# Patient Record
Sex: MALE | Race: BLACK OR AFRICAN AMERICAN | NOT HISPANIC OR LATINO | Employment: FULL TIME | URBAN - METROPOLITAN AREA
[De-identification: names, ages, dates, MRNs, and addresses within clinical notes are randomized per-mention and may not be internally consistent; named-entity substitution may affect disease eponyms.]

---

## 2018-04-12 ENCOUNTER — TELEPHONE (OUTPATIENT)
Dept: SPINE | Facility: CLINIC | Age: 58
End: 2018-04-12

## 2018-04-12 DIAGNOSIS — M54.50 LUMBAR PAIN: Primary | ICD-10-CM

## 2018-04-13 ENCOUNTER — OFFICE VISIT (OUTPATIENT)
Dept: SPINE | Facility: CLINIC | Age: 58
End: 2018-04-13
Payer: COMMERCIAL

## 2018-04-13 ENCOUNTER — HOSPITAL ENCOUNTER (OUTPATIENT)
Dept: RADIOLOGY | Facility: OTHER | Age: 58
Discharge: HOME | End: 2018-04-13
Attending: PHYSICIAN ASSISTANT
Payer: COMMERCIAL

## 2018-04-13 VITALS
HEART RATE: 100 BPM | SYSTOLIC BLOOD PRESSURE: 151 MMHG | HEIGHT: 69 IN | WEIGHT: 150 LBS | DIASTOLIC BLOOD PRESSURE: 98 MMHG | BODY MASS INDEX: 22.22 KG/M2

## 2018-04-13 DIAGNOSIS — M54.50 LUMBAR PAIN: ICD-10-CM

## 2018-04-13 DIAGNOSIS — M54.17 THORACIC AND LUMBOSACRAL NEURITIS: ICD-10-CM

## 2018-04-13 DIAGNOSIS — M47.26 OTHER SPONDYLOSIS WITH RADICULOPATHY, LUMBAR REGION: Primary | ICD-10-CM

## 2018-04-13 DIAGNOSIS — M54.14 THORACIC AND LUMBOSACRAL NEURITIS: ICD-10-CM

## 2018-04-13 DIAGNOSIS — M51.37 DDD (DEGENERATIVE DISC DISEASE), LUMBOSACRAL: ICD-10-CM

## 2018-04-13 PROCEDURE — 72100 X-RAY EXAM L-S SPINE 2/3 VWS: CPT | Mod: 26,,, | Performed by: RADIOLOGY

## 2018-04-13 PROCEDURE — 99999 PR PBB SHADOW E&M-EST. PATIENT-LVL III: CPT | Mod: PBBFAC,,, | Performed by: PHYSICIAN ASSISTANT

## 2018-04-13 PROCEDURE — 72100 X-RAY EXAM L-S SPINE 2/3 VWS: CPT | Mod: TC,FY

## 2018-04-13 PROCEDURE — 99204 OFFICE O/P NEW MOD 45 MIN: CPT | Mod: S$GLB,,, | Performed by: PHYSICIAN ASSISTANT

## 2018-04-13 PROCEDURE — 72120 X-RAY BEND ONLY L-S SPINE: CPT | Mod: 26,,, | Performed by: RADIOLOGY

## 2018-04-13 RX ORDER — GABAPENTIN 300 MG/1
CAPSULE ORAL
Qty: 90 CAPSULE | Refills: 2 | Status: SHIPPED | OUTPATIENT
Start: 2018-04-13 | End: 2018-10-05 | Stop reason: SDUPTHER

## 2018-04-13 RX ORDER — HYDROCHLOROTHIAZIDE 25 MG/1
TABLET ORAL
COMMUNITY
Start: 2018-04-02

## 2018-04-13 RX ORDER — GABAPENTIN 300 MG/1
CAPSULE ORAL
Qty: 90 CAPSULE | Refills: 2 | Status: SHIPPED | OUTPATIENT
Start: 2018-04-13 | End: 2018-04-13 | Stop reason: SDUPTHER

## 2018-04-13 NOTE — PROGRESS NOTES
"Subjective:      Patient ID: Nacho Liu is a 57 y.o. male.    Chief Complaint: Leg Pain      HPI     He is new to the [facility] system. History of heart murmur and HTN.     6-12 month history of intermittent right lateral leg pain to his foot. Minimal LBP and no left leg pain. 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 Pain worse with standing, bending, sitting. No known alleviating factors. No numbness, tingling, or weakness in his legs. Pain feels like "boiling water being poured down his leg." He rates his pain as a 2 on a scale of 1-10, when he has the pain it is a 10. No known injury. No previous back issues.     He had lumbar PAVITHRA x 2 by Dr. Peña at [facility] (was this year) with only 1-2 days of improvement. He went to PT for 4 weeks and it made him worse (was this year). No surgery on his back. He has not been on neurontin. No muscle relaxers. No relief with motrin or aleve.       Review of Systems   Constitution: Negative for fever, weakness, malaise/fatigue, night sweats, weight gain and weight loss.   HENT: Negative for hearing loss, nosebleeds and odynophagia.    Eyes: Negative for blurred vision and double vision.   Cardiovascular: Negative for chest pain, irregular heartbeat and palpitations.   Respiratory: Negative for cough, hemoptysis, shortness of breath and wheezing.    Endocrine: Negative for cold intolerance and polydipsia.   Hematologic/Lymphatic: Does not bruise/bleed easily.   Skin: Negative for dry skin, poor wound healing, rash and suspicious lesions.   Musculoskeletal:        See HPI for pertinent positives.   Gastrointestinal: Negative for bloating, abdominal pain, constipation, diarrhea, hematochezia, melena, nausea and vomiting.   Genitourinary: Negative for bladder incontinence, dysuria, hematuria, hesitancy and incomplete emptying.   Neurological: Negative for disturbances in coordination, dizziness, focal weakness, headaches, loss of balance, numbness, paresthesias and seizures.      "   Positive for abnormal arm/leg sensations.    Psychiatric/Behavioral: Negative for depression and hallucinations. The patient is not nervous/anxious.            Objective:        General: Nacho is well-developed, well-nourished, appears stated age, in no acute distress, alert and oriented to time, place and person.     General    Vitals reviewed.  Constitutional: He is oriented to person, place, and time. He appears well-developed and well-nourished.   Pulmonary/Chest: Effort normal.   Abdominal: He exhibits no distension.   Neurological: He is alert and oriented to person, place, and time.   Psychiatric: He has a normal mood and affect. His behavior is normal. Judgment and thought content normal.           Gait: normal, tandem walking is normal and he is able to heel/toe stand.     On exam of the lumbar spine, Inspection of back is normal, No tenderness noted    Skin in lumbar region is warm to the touch without visible rashes.     muscle tone normal without spasm, limited range of motion with pain  Pain in flexion.    Strength testing of the bilateral LEs shows  Right hip abduction:  +5/5  Left hip abduction:  +5/5  Right hip flexion:  +5/5  Left hip flexion:  +5/5  Right hip extensors:  +5/5  Left hip extensors:  +5/5  Right quadriceps:  +5/5  Left quadriceps:  +5/5  Right hamstring:  +5/5  Left hamstring:  +5/5  Right dorsiflexion:  +5/5  Left dorsiflexion:  +5/5  Right plantar flexion:  +5/5  Left plantar flexion:  +5/5   Right EHL:  +5/5   Left EHL:  +5/5    negative clonus of bilateral LEs.     negative straight leg raise on bilateral LEs.     Sensation is grossly intact in L2, L3, L4, L5, and S1 distribution.    Right hip has no pain with IR/ER. Left hip has no pain with IR/ER.      On exam of bilateral UEs, patient has full painfree ROM with no signs of clubbing, laxity, cyanosis, edema, instability, weakness, or tenderness.       XRAY INTERPRETATION:  X-rays of lumbar spine (AP/lat/flex/ext) dated  4/13/18 are personally reviewed and show mild lumbar spondylosis and DDD L4-L5 and L5-S1.        Assessment:       1. Other spondylosis with radiculopathy, lumbar region    2. DDD (degenerative disc disease), lumbosacral    3. Thoracic and lumbosacral neuritis           Plan:       Orders Placed This Encounter    gabapentin (NEURONTIN) 300 MG capsule       6-12 month history of intermittent right lateral leg pain to his foot. Minimal LBP and no left leg pain. He has daily pain that is worse at work (shucks oysters at [employer]). Known mild lumbar spondylosis and DDD L4-L5 and L5-S1. Had MRI and ESIs x 2 at [facility] with only 1-2 days of relief. No relief with PT. Treatment options reviewed with patient along with above lumbar XRs. Following plan made:     - Trial of neurontin 300mg to start q hs and increase to tid as tolerated. Reviewed dosing and side effects.   - Get injection records from Dr. Peña at [facility]. JAEL signed.   - Get MRI report and disc from [facility] for my review.   - Once I have injection records and MRI, I will review and call patient with further recommendations. May need to review with Varsha as he's failed conservative treatment (time, PT, ESIs).   - /98 with no CP or SOB. Did not take HCTZ today. Advised to take medications and call PCP if any issues.     Follow-up: Follow-up if symptoms worsen or fail to improve. If there are any questions prior to this, the patient was instructed to contact the office.

## 2018-05-21 ENCOUNTER — TELEPHONE (OUTPATIENT)
Dept: SPINE | Facility: CLINIC | Age: 58
End: 2018-05-21

## 2018-05-21 DIAGNOSIS — M47.26 OTHER SPONDYLOSIS WITH RADICULOPATHY, LUMBAR REGION: Primary | ICD-10-CM

## 2018-05-21 DIAGNOSIS — M54.17 THORACIC AND LUMBOSACRAL NEURITIS: ICD-10-CM

## 2018-05-21 DIAGNOSIS — M51.37 DDD (DEGENERATIVE DISC DISEASE), LUMBOSACRAL: ICD-10-CM

## 2018-05-21 DIAGNOSIS — M54.14 THORACIC AND LUMBOSACRAL NEURITIS: ICD-10-CM

## 2018-05-21 RX ORDER — METHYLPREDNISOLONE 4 MG/1
TABLET ORAL
Qty: 1 PACKAGE | Refills: 0 | Status: SHIPPED | OUTPATIENT
Start: 2018-05-21 | End: 2018-10-05

## 2018-05-21 NOTE — TELEPHONE ENCOUNTER
Will resend his JAEL for injection information. He dropped off MRI report from 11/9/17 showing DDD L4-S1 with right foraminal disc L5-S1 abutting bilateral SI nerve and right L5 nerve. No films available.     He either needs to get me these MRI films or we can update MRI since its been 6 months.     Can do dose pack in the interim.

## 2018-05-21 NOTE — TELEPHONE ENCOUNTER
Pt walked into clinic complaining of increased right leg pain. Per pts chart, pt was supposed to get MRI and records from [facility]. Pt stated he has not gone to  records yet. Nasrin recommended pt get updated MRI here at [facility] and send a dose pack to pts pharm. Pt verbalized understanding. Please order MRI and send rx to pharm in pts chart. Thanks

## 2018-05-23 ENCOUNTER — TELEPHONE (OUTPATIENT)
Dept: SPINE | Facility: CLINIC | Age: 58
End: 2018-05-23

## 2018-05-30 NOTE — PROGRESS NOTES
Subjective:      Patient ID: Nacho Liu is a 57 y.o. male.    Chief Complaint: Low-back Pain      HPI  ([place])    History of heart murmur and HTN.     Known mild lumbar spondylosis and DDD L4-L5 and L5-S1. Had MRI and ESIs x 2 at [facility] with only 1-2 days of relief. No relief with PT.     He dropped off MRI report from 11/9/17 showing DDD L4-S1 with right foraminal disc L5-S1 abutting bilateral SI nerve and right L5 nerve. No films available. I have not received his injection records from [facility].     Given a dose pack and here to review his updated lumbar MRI today.     He continues with more constant severe right lateral leg pain to his foot. Minimal LBP and no left leg pain. 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 Pain is also worse with standing, bending, sitting. No known alleviating factors. no improvement with neurontin, ultram, or medrol dose pack. No numbness, tingling, or weakness in his legs. Pain is sharp and stabbing. He rates his pain as a 10 on a scale of 1-10. As above, he had lumbar PAVITHRA x 2 by Dr. Peña at [facility] (was this year) with only 1-2 days of improvement. He went to PT for 4 weeks and it made him worse (was this year). No surgery on his back.       Review of Systems   Constitution: Negative for chills, fever, night sweats and weight gain.   Gastrointestinal: Negative for bowel incontinence, nausea and vomiting.   Genitourinary: Negative for bladder incontinence.   Neurological: Negative for disturbances in coordination and loss of balance.           Objective:        General: Nacho is well-developed, well-nourished, appears stated age, in no acute distress, alert and oriented to time, place and person.     Ortho/SPM Exam     Patient sits comfortably in the exam room and answers questions appropriately. Grossly patient is able to move bilateral LEs without difficulty. Ambulates with a limp favoring his right LE.     Strength testing of the bilateral LEs shows  Right hip abduction:   +5/5  Left hip abduction:  +5/5  Right hip flexion:  +5/5   Left hip flexion:  +5/5  Right hip extensors:  +5/5  Left hip extensors:  +5/5  Right quadriceps:  +5/5  Left quadriceps:  +5/5  Right hamstring:  +5/5  Left hamstring:  +5/5  Right dorsiflexion:   +5/5  Left dorsiflexion:  +5/5  Right plantar flexion:  +5/5  Left plantar flexion:  +5/5   Right EHL:  +5/5   Left EHL:  +5/5    Sensations is grossly intact to light touch.       XRAY INTERPRETATION:  MRI of lumbar spine dated 5/31/18 is personally reviewed and shows disc bulge/facet hypertrophy L4-L5 with annular tear. Disc bulge/facet hypertrophy L5-S1 with annular tear and mild right foraminal stenosis.     Above imaging reviewed with Dr. Maddox after patient was seen.         Assessment:       1. Thoracic and lumbosacral neuritis    2. DDD (degenerative disc disease), lumbosacral    3. Other spondylosis with radiculopathy, lumbar region    4. Foraminal stenosis of lumbar region    5. Annular tear of lumbar disc           Plan:            6-12 month history of intermittent right lateral leg pain to his foot that has become constant and severe. Minimal LBP and no left leg pain. Known mild lumbar spondylosis and DDD L4-L5 and L5-S1 with mild right foraminal stenosis L5-S1. Had ESIs x 2 at [facility] with only 1-2 days of relief. No relief with PT, neurontin, medrol dose pack, or ultram. Treatment options reviewed with patient along with above lumbar MRI. Following plan made:     - Recommend right L4-L5 and right L5-S1 TF PAVITHRA with pain management. He is not interested in any further injections.   - Continue neurontin.   - Have faxed JAEL for injection records to Dr. Peña at [facility]. Will try calling as well.   - Reviewed with Dr. Maddox after this visit. Not sure if he is a surgical candidate. Will try to get injection records and have him f/u with Dr. Maddox to discuss further. Patient advised.    - Of note, per radiology There is heterogeneous T1 bone marrow  signal throughout the lumbosacral spine most compatible with red marrow reconversion clinical correlation for possible underlying anemia. Patient advised to discuss further with PCP.     ADDENDUM 5/31/18:  Notes received from Dr. Luis Felipe Peña. He had right L4-L5 and L5-S1 TF PAVITHRA on 12/8/17 and 2/2/18. He also dropped of his previous MRI. This will be scanned into system.     Follow-up: Follow-up if symptoms worsen or fail to improve. If there are any questions prior to this, the patient was instructed to contact the office.

## 2018-05-31 ENCOUNTER — TELEPHONE (OUTPATIENT)
Dept: SPINE | Facility: CLINIC | Age: 58
End: 2018-05-31

## 2018-05-31 ENCOUNTER — OFFICE VISIT (OUTPATIENT)
Dept: SPINE | Facility: CLINIC | Age: 58
End: 2018-05-31
Payer: COMMERCIAL

## 2018-05-31 ENCOUNTER — HOSPITAL ENCOUNTER (OUTPATIENT)
Dept: RADIOLOGY | Facility: OTHER | Age: 58
Discharge: HOME | End: 2018-05-31
Attending: PHYSICIAN ASSISTANT
Payer: COMMERCIAL

## 2018-05-31 VITALS
HEART RATE: 63 BPM | DIASTOLIC BLOOD PRESSURE: 81 MMHG | SYSTOLIC BLOOD PRESSURE: 141 MMHG | WEIGHT: 150 LBS | HEIGHT: 69 IN | BODY MASS INDEX: 22.22 KG/M2

## 2018-05-31 DIAGNOSIS — M51.37 DDD (DEGENERATIVE DISC DISEASE), LUMBOSACRAL: ICD-10-CM

## 2018-05-31 DIAGNOSIS — M47.26 OTHER SPONDYLOSIS WITH RADICULOPATHY, LUMBAR REGION: ICD-10-CM

## 2018-05-31 DIAGNOSIS — M54.14 THORACIC AND LUMBOSACRAL NEURITIS: ICD-10-CM

## 2018-05-31 DIAGNOSIS — M54.17 THORACIC AND LUMBOSACRAL NEURITIS: Primary | ICD-10-CM

## 2018-05-31 DIAGNOSIS — M54.14 THORACIC AND LUMBOSACRAL NEURITIS: Primary | ICD-10-CM

## 2018-05-31 DIAGNOSIS — M48.061 FORAMINAL STENOSIS OF LUMBAR REGION: ICD-10-CM

## 2018-05-31 DIAGNOSIS — M54.17 THORACIC AND LUMBOSACRAL NEURITIS: ICD-10-CM

## 2018-05-31 DIAGNOSIS — M51.36 ANNULAR TEAR OF LUMBAR DISC: ICD-10-CM

## 2018-05-31 PROCEDURE — 3008F BODY MASS INDEX DOCD: CPT | Mod: CPTII,S$GLB,, | Performed by: PHYSICIAN ASSISTANT

## 2018-05-31 PROCEDURE — 72148 MRI LUMBAR SPINE W/O DYE: CPT | Mod: 26,,, | Performed by: RADIOLOGY

## 2018-05-31 PROCEDURE — 99213 OFFICE O/P EST LOW 20 MIN: CPT | Mod: S$GLB,,, | Performed by: PHYSICIAN ASSISTANT

## 2018-05-31 PROCEDURE — 72148 MRI LUMBAR SPINE W/O DYE: CPT | Mod: TC

## 2018-05-31 PROCEDURE — 99999 PR PBB SHADOW E&M-EST. PATIENT-LVL III: CPT | Mod: PBBFAC,,, | Performed by: PHYSICIAN ASSISTANT

## 2018-05-31 NOTE — TELEPHONE ENCOUNTER
Spoke to pt. Informed that Dr Maddox would like pt to f/u with him in clinic to discuss whether or not he is a surgical candidate. Informed pt that clinic has been unsuccessful in getting injection records from [facility]. Pt stated he would try to obtain them prior to appt with Dr. Maddox. Also informed pt that MRI showed findings of anemia and should be discussed with PCP> Pt  Verbalized understanding. Mailed MRI report to pt and appt with Varsha.

## 2018-05-31 NOTE — TELEPHONE ENCOUNTER
----- Message from Nasrin Benz PA-C sent at 5/31/2018 12:37 PM CDT -----  We have sent JAEL to Dr. Peña at [facility] multiple times. Please try to call his office to see if we can get injection records.     Call patient and let him know that Dr. Maddox is not sure if he is surgical candidate. He wants to see him in clinic to discuss. Please make this appointment. Ask if he can get injection records as well as we are having trouble getting them.     Finally, please let him know that MRI showed some findings that are concerning for anemia. He should discuss this with his primary care doctor. We can mail him copy of MRI.     Thanks.

## 2018-06-11 ENCOUNTER — OFFICE VISIT (OUTPATIENT)
Dept: ORTHOPEDICS | Facility: CLINIC | Age: 58
End: 2018-06-11
Payer: COMMERCIAL

## 2018-06-11 VITALS — BODY MASS INDEX: 21.25 KG/M2 | WEIGHT: 143.5 LBS | HEIGHT: 69 IN

## 2018-06-11 DIAGNOSIS — M54.16 LUMBAR RADICULOPATHY: Primary | ICD-10-CM

## 2018-06-11 PROCEDURE — 3008F BODY MASS INDEX DOCD: CPT | Mod: CPTII,S$GLB,, | Performed by: ORTHOPAEDIC SURGERY

## 2018-06-11 PROCEDURE — 99999 PR PBB SHADOW E&M-EST. PATIENT-LVL II: CPT | Mod: PBBFAC,,, | Performed by: ORTHOPAEDIC SURGERY

## 2018-06-11 PROCEDURE — 99214 OFFICE O/P EST MOD 30 MIN: CPT | Mod: S$GLB,,, | Performed by: ORTHOPAEDIC SURGERY

## 2018-06-11 NOTE — PROGRESS NOTES
DATE: 6/11/2018  PATIENT: Nacho Liu    Attending Physician: Crow Maddox M.D.    CHIEF COMPLAINT: right leg pain    HISTORY:  Nacho Liu is a 57 y.o. male  at [place] here for initial evaluation of right leg pain (Back - 0, Leg - 10). The pain has been present for 6 months to 1 year. The patient describes the pain as a burning pain shooting down his right lateral buttock and leg. He states this occurs every 2-3 minutes.  The pain is worse with prolonged standing/sitting/bending and improved by no. There is no associated numbness and tingling. There is no subjective weakness. Prior treatments have included PAVITHRA's, NSAID's, and PT, but no surgery. He states he is unable to sleep due to the pain.    The Patient denies myelopathic symptoms such as handwriting changes or difficulty with buttons/coins/keys. Denies perineal paresthesias, bowel/bladder dysfunction.    PAST MEDICAL/SURGICAL HISTORY:  Past Medical History:   Diagnosis Date    Heart murmur     Hypertension      Past Surgical History:   Procedure Laterality Date    KNEE SURGERY         Current Medications:   Current Outpatient Prescriptions:     gabapentin (NEURONTIN) 300 MG capsule, 1 po q hs x 3 days, then 1 po bid x 3 days, then 1 po tid., Disp: 90 capsule, Rfl: 2    hydroCHLOROthiazide (HYDRODIURIL) 25 MG tablet, , Disp: , Rfl:     methylPREDNISolone (MEDROL DOSEPACK) 4 mg tablet, use as directed x 6 days, Disp: 1 Package, Rfl: 0    Social History:   Social History     Social History    Marital status:      Spouse name: N/A    Number of children: N/A    Years of education: N/A     Occupational History    Not on file.     Social History Main Topics    Smoking status: Never Smoker    Smokeless tobacco: Never Used    Alcohol use Yes    Drug use: No    Sexual activity: Not on file     Other Topics Concern    Not on file     Social History Narrative    No narrative on file       REVIEW OF  "SYSTEMS:  Constitution: Negative. Negative for chills, fever and night sweats.   Cardiovascular: Negative for chest pain and syncope.   Respiratory: Negative for cough and shortness of breath.   Gastrointestinal: See HPI. Negative for nausea/vomiting. Negative for abdominal pain.  Genitourinary: See HPI. Negative for discoloration or dysuria.  Hematologic/Lymphatic: Negative for bleeding/clotting disorders.   Musculoskeletal: Negative for falls and muscle weakness.   Neurological: See HPI. No history of seizures. No history of cranial surgery or shunts.  Neurological: See HPI. No seizures.   Endocrine: Negative for polydipsia, polyphagia and polyuria.   Allergic/Immunologic: Negative for hives and persistent infections.     EXAM:  Ht 5' 9" (1.753 m)   Wt 65.1 kg (143 lb 8.3 oz)   BMI 21.19 kg/m²     PHYSICAL EXAMINATION:    General: The patient is a pleasant 57 y.o. male in no apparent distress, the patient is orientatied to person, place and time.  Psych: Normal mood and affect  HEENT: Vision grossly intact, hearing intact to the spoken word.  Lungs: Respirations unlabored.  Gait: Normal station and gait, no difficulty with toe or heel walk.   Skin: Dorsal lumbar skin negative for rashes, lesions, hairy patches and surgical scars. There is no lumbar tenderness to palpation.  Range of motion: Lumbar range of motion is acceptable.  Spinal Balance: Global saggital and coronal spinal balance acceptable, no significant for scoliosis and kyphosis.  Musculoskeletal: No pain with the range of motion of the bilateral hips. No trochanteric tenderness to palpation.  Vascular: Bilateral lower extremities warm and well perfused, Dorsalis pedis pulses 2+ bilaterally.  Neurological: Normal strength and tone in all major motor groups in the bilateral lower extremities. Normal sensation to light touch in the L2-S1 dermatomes bilaterally.  Deep tendon reflexes symmetric in the bilateral lower extremities.  Negative Babinski " bilaterally. Straight leg raise negative bilaterally.    IMAGING:      Today I personally reviewed AP, Lat and Flex/Ex upright L-spine that demonstrate normal alignment. There is disc degeneration worst at L4-5.    Body mass index is 21.19 kg/m².  No results found for: HGBA1C    ASSESSMENT/PLAN:    Diagnoses and all orders for this visit:    Lumbar radiculopathy      The patient's symptoms of right leg radiculopathy do not correlate with his MRI. Additionally, the fact that he got no relief from an PAVITHRA points against a lumbar radiculopathy as the etiology of his symptoms.  Will obtain MRI of the pelvis and an EMG of the RLE.  F/U after.

## 2018-07-06 ENCOUNTER — PROCEDURE VISIT (OUTPATIENT)
Dept: NEUROLOGY | Facility: CLINIC | Age: 58
End: 2018-07-06
Payer: COMMERCIAL

## 2018-07-06 VITALS — BODY MASS INDEX: 21.18 KG/M2 | WEIGHT: 143 LBS | HEIGHT: 69 IN

## 2018-07-06 DIAGNOSIS — M54.16 LUMBAR RADICULOPATHY: Primary | ICD-10-CM

## 2018-07-06 PROCEDURE — 95909 NRV CNDJ TST 5-6 STUDIES: CPT | Mod: S$GLB,,, | Performed by: NEUROLOGICAL SURGERY

## 2018-07-06 PROCEDURE — 95886 MUSC TEST DONE W/N TEST COMP: CPT | Mod: S$GLB,,, | Performed by: NEUROLOGICAL SURGERY

## 2018-07-06 RX ORDER — PREGABALIN 75 MG/1
75 CAPSULE ORAL 2 TIMES DAILY
Qty: 60 CAPSULE | Refills: 6 | Status: CANCELLED | OUTPATIENT
Start: 2018-07-06 | End: 2019-01-04

## 2018-07-10 ENCOUNTER — TELEPHONE (OUTPATIENT)
Dept: NEUROLOGY | Facility: CLINIC | Age: 58
End: 2018-07-10

## 2018-07-10 RX ORDER — PREGABALIN 75 MG/1
75 CAPSULE ORAL 2 TIMES DAILY
COMMUNITY
End: 2018-10-05

## 2018-07-10 NOTE — TELEPHONE ENCOUNTER
----- Message from Steven Ortiz sent at 7/10/2018  9:35 AM CDT -----  Contact: Self  Pt states Dr Alejandra was suppose to send his medication to the pharmacy on last Friday, but it's not there    Mr [id number]-[phone]        Sent to Dr. Alejandra.

## 2018-07-11 ENCOUNTER — HOSPITAL ENCOUNTER (OUTPATIENT)
Dept: RADIOLOGY | Facility: HOSPITAL | Age: 58
Discharge: HOME | End: 2018-07-11
Attending: ORTHOPAEDIC SURGERY
Payer: COMMERCIAL

## 2018-07-11 DIAGNOSIS — M54.16 LUMBAR RADICULOPATHY: ICD-10-CM

## 2018-07-11 PROCEDURE — 72195 MRI PELVIS W/O DYE: CPT | Mod: TC

## 2018-07-11 PROCEDURE — 72195 MRI PELVIS W/O DYE: CPT | Mod: 26,,, | Performed by: RADIOLOGY

## 2018-07-19 ENCOUNTER — TELEPHONE (OUTPATIENT)
Dept: SPINE | Facility: CLINIC | Age: 58
End: 2018-07-19

## 2018-07-19 NOTE — TELEPHONE ENCOUNTER
----- Message from Sofia Vega sent at 7/19/2018  2:26 PM CDT -----  Contact: Pt    Name of Who is Calling:WILIAN RODRÍGUEZ [[id number]]    What is the request in detail: Patient states he would like to know his test results  Please contact to further discuss and advise    Can the clinic reply by [facility]: No    What Number to Call Back if not in [facility]: [phone]

## 2018-07-23 ENCOUNTER — TELEPHONE (OUTPATIENT)
Dept: SPINE | Facility: CLINIC | Age: 58
End: 2018-07-23

## 2018-07-23 NOTE — TELEPHONE ENCOUNTER
----- Message from Hunter Marks sent at 7/23/2018  9:41 AM CDT -----  Contact: Nacho Liu, patient            Name of Who is Calling: Nacho Liu, patient      What is the request in detail: Patient called for his test results and states pain medication is not working      Can the clinic reply by [facility]: No      What Number to Call Back if not in [facility]: [phone]

## 2018-07-23 NOTE — TELEPHONE ENCOUNTER
----- Message from Hunter Marks sent at 7/23/2018  9:41 AM CDT -----  Contact: Nacho Liu, patient            Name of Who is Calling: Nacho Liu, patient      What is the request in detail: Patient called for his test results and states pain medication is not working      Can the clinic reply by [facility]: No      What Number to Call Back if not in [place]: [phone]

## 2018-07-25 ENCOUNTER — DOCUMENTATION ONLY (OUTPATIENT)
Dept: SPINE | Facility: CLINIC | Age: 58
End: 2018-07-25

## 2018-07-25 ENCOUNTER — TELEPHONE (OUTPATIENT)
Dept: SPINE | Facility: CLINIC | Age: 58
End: 2018-07-25

## 2018-07-25 DIAGNOSIS — M51.36 ANNULAR TEAR OF LUMBAR DISC: ICD-10-CM

## 2018-07-25 DIAGNOSIS — M54.17 THORACIC AND LUMBOSACRAL NEURITIS: Primary | ICD-10-CM

## 2018-07-25 DIAGNOSIS — M51.37 DDD (DEGENERATIVE DISC DISEASE), LUMBOSACRAL: ICD-10-CM

## 2018-07-25 DIAGNOSIS — M48.061 FORAMINAL STENOSIS OF LUMBAR REGION: ICD-10-CM

## 2018-07-25 DIAGNOSIS — M47.26 OTHER SPONDYLOSIS WITH RADICULOPATHY, LUMBAR REGION: ICD-10-CM

## 2018-07-25 DIAGNOSIS — M54.14 THORACIC AND LUMBOSACRAL NEURITIS: Primary | ICD-10-CM

## 2018-07-25 NOTE — TELEPHONE ENCOUNTER
----- Message from Nasrin Benz PA-C sent at 7/25/2018  7:58 AM CDT -----  He has appt with me this afternoon. Please let him know that the MRI of his pelvis and his nerve test were normal. I reviewed everything with Dr. Maddox and he wants patient to see pain management for further evaluation. I am happy to see him this afternoon or we can just set him up to see pain.     If he asks about medications, please remind him that I do not prescribe narcotics.     Thanks!

## 2018-07-25 NOTE — TELEPHONE ENCOUNTER
Patient was contacted and informed that his Mri and nerve test were normal and that he can follow up with Pain Mgmt. Patient was offered an appt in Pain Mgmt and he accepted.

## 2018-07-25 NOTE — PROGRESS NOTES
MRI of pelvis and EMG of LEs were normal. Patient reviewed with Dr. Maddox. He recommends referral to pain management. Unsure of etiology of pain.     Patient advised and wanted to cancel appointment with me today.

## 2018-08-22 ENCOUNTER — OFFICE VISIT (OUTPATIENT)
Dept: PAIN MEDICINE | Facility: CLINIC | Age: 58
End: 2018-08-22
Attending: ANESTHESIOLOGY
Payer: COMMERCIAL

## 2018-08-22 VITALS
WEIGHT: 144.81 LBS | DIASTOLIC BLOOD PRESSURE: 82 MMHG | HEIGHT: 69 IN | HEART RATE: 68 BPM | TEMPERATURE: 99 F | BODY MASS INDEX: 21.45 KG/M2 | SYSTOLIC BLOOD PRESSURE: 156 MMHG

## 2018-08-22 DIAGNOSIS — M51.36 DDD (DEGENERATIVE DISC DISEASE), LUMBAR: Primary | ICD-10-CM

## 2018-08-22 DIAGNOSIS — M54.16 LUMBAR RADICULOPATHY: Primary | ICD-10-CM

## 2018-08-22 DIAGNOSIS — M47.26 OSTEOARTHRITIS OF SPINE WITH RADICULOPATHY, LUMBAR REGION: ICD-10-CM

## 2018-08-22 PROCEDURE — 99999 PR PBB SHADOW E&M-EST. PATIENT-LVL III: CPT | Mod: PBBFAC,,, | Performed by: ANESTHESIOLOGY

## 2018-08-22 PROCEDURE — 99245 OFF/OP CONSLTJ NEW/EST HI 55: CPT | Mod: S$GLB,,, | Performed by: ANESTHESIOLOGY

## 2018-08-22 NOTE — LETTER
August 22, 2018      Crow Maddox MD  [address]  [facility] [address]           [facility]  [address]  [facility] [address]  Phone: [phone]  Fax: [phone]          Patient: Nacho Liu   MR Number: [id number]   YOB: 1960   Date of Visit: 8/22/2018       Dear Dr. Crow Maddox:    Thank you for referring Nacho Liu to me for evaluation. Attached you will find relevant portions of my assessment and plan of care.    If you have questions, please do not hesitate to call me. I look forward to following Nacho Liu along with you.    Sincerely,    Pascual Yadav MD    Enclosure  CC:  No Recipients    If you would like to receive this communication electronically, please contact [email or web address] or [phone] to request more information on [facility] Link access.    For providers and/or their staff who would like to refer a patient to [facility], please contact us through our one-stop-shop provider referral line, [facility], at [phone].    If you feel you have received this communication in error or would no longer like to receive these types of communications, please e-mail [email or web address]

## 2018-08-22 NOTE — PROGRESS NOTES
"Subjective:      Patient ID: Nacho Liu is a 57 y.o. male.    Chief Complaint: Leg Pain (right side)    Referred by: Crow Maddox MD     Pain Scales  Best: 10/10  Worst: 10/10  Usually: 10/10  Today: 10/10    Leg Pain    Associated symptoms include stiffness. Pertinent negatives include no fever or itching.     Patient with left leg pain for past 1 year. Patient states that he "just woke up" with it one morning. No trauma or other inciting event. Pain is located from the right buttock, radiating down the lateral right LE and into the bottom of the right foot. The pain is constant throughout the day and night. It is described as burning, "like pouring boiling water down my leg". It is worse early in the morning, lying in bed (especially on stomach) and with sitting upright. It is somewhat relieved with stretching his leg out straight and walking. He takes gabapentin 300 mg TID, but states that it is not helping. He has tried ES Tylenol and ibuprofen, neither of which helped. Had PAVITHRA at [facility] about 6 months ago, states that it did not help. Ice, heat, and physical therapy have not helped. Denies B/B incontinence or saddle anesthesia.     Interventional Pain History  Epidural in past at [facility].    Past Medical History:   Diagnosis Date    Heart murmur     Hypertension        Past Surgical History:   Procedure Laterality Date    KNEE SURGERY         Review of patient's allergies indicates:   Allergen Reactions    Lipitor [atorvastatin] Swelling       Current Outpatient Medications   Medication Sig Dispense Refill    gabapentin (NEURONTIN) 300 MG capsule 1 po q hs x 3 days, then 1 po bid x 3 days, then 1 po tid. 90 capsule 2    hydroCHLOROthiazide (HYDRODIURIL) 25 MG tablet       methylPREDNISolone (MEDROL DOSEPACK) 4 mg tablet use as directed x 6 days 1 Package 0    pregabalin (LYRICA) 75 MG capsule Take 75 mg by mouth 2 (two) times daily.       No current facility-administered medications for this " visit.        Family History   Problem Relation Age of Onset    Arthritis Mother     Cirrhosis Father        Social History     Socioeconomic History    Marital status:      Spouse name: Not on file    Number of children: Not on file    Years of education: Not on file    Highest education level: Not on file   Social Needs    Financial resource strain: Not on file    Food insecurity - worry: Not on file    Food insecurity - inability: Not on file    Transportation needs - medical: Not on file    Transportation needs - non-medical: Not on file   Occupational History    Not on file   Tobacco Use    Smoking status: Never Smoker    Smokeless tobacco: Never Used   Substance and Sexual Activity    Alcohol use: Yes    Drug use: No    Sexual activity: Not on file   Other Topics Concern    Not on file   Social History Narrative    Not on file           Review of Systems   Constitution: Negative for chills, fever, malaise/fatigue, weight gain and weight loss.   HENT: Negative for ear pain and hoarse voice.    Eyes: Negative for blurred vision, pain and visual disturbance.   Cardiovascular: Negative for chest pain, dyspnea on exertion and irregular heartbeat.   Respiratory: Negative for cough, shortness of breath and wheezing.    Endocrine: Negative for cold intolerance and heat intolerance.   Hematologic/Lymphatic: Negative for adenopathy and bleeding problem. Does not bruise/bleed easily.   Skin: Negative for color change, itching and rash.   Musculoskeletal: Positive for stiffness. Negative for back pain.   Gastrointestinal: Negative for change in bowel habit, diarrhea, hematemesis, hematochezia, melena and vomiting.   Genitourinary: Negative for flank pain, frequency, hematuria and urgency.   Neurological: Positive for loss of balance. Negative for difficulty with concentration, dizziness, headaches and seizures.   Psychiatric/Behavioral: Negative for altered mental status, depression and suicidal  ideas. The patient is not nervous/anxious.    Allergic/Immunologic: Negative for HIV exposure.     IMAGING:  Contains abnormal data MRI Lumbar Spine Without Contrast   Order: [id number]   Status:  Final result   Visible to patient:  No (Not Released)   Next appt:  None   Dx:  DDD (degenerative disc disease), lumb...   Details     Reading Physician Reading Date Result Priority   Renny No, DO 5/31/2018       Narrative     EXAMINATION:  MRI LUMBAR SPINE WITHOUT CONTRAST    CLINICAL HISTORY:  evaluate right LE radiculitis; Other spondylosis with radiculopathy, lumbar region    TECHNIQUE:  Sagittal T1, T2, stir and axial T1-T2 imaging of the lumbar spine without contrast.    COMPARISON:  Radiograph 04/13/2018    FINDINGS:  There is straightening of the normal lumbar lordosis.  Heterogeneous T1 bone marrow signal throughout the lumbar spine without focal bone marrow edema.  Overall concerning for  red marrow reconversion clinical correlation for underlying chronic anemia, no evidence for focal infiltrative process.    There is scattered endplate degeneration most prominent at L4/L5 level with superimposed disc desiccation and height loss.    The distal spinal cord and conus is normal in signal and contour tip of the conus approximates the inferior L1 level.    No aneurysmal dilatation of the visualized abdominal aorta.    T12/L1 through L1/L2: No significant disc bulge, central canal or neural foraminal stenosis.    L2/L3: No significant disc bulge central canal or neural foraminal stenosis.    L3/L4:Small posterior disc osteophyte without significant central canal or neural foraminal stenosis    L4/L5:Posterior disc osteophyte with ligamentum flavum hypertrophy and small annular fissure with mild central canal stenosis and mild neural foraminal narrowing.    L5/S1: Small posterior disc osteophyte with ligamentum flavum hypertrophy without significant central canal stenosis with attenuation of the thecal sac related  to epidural lipomatosis.  There is facet joint arthropathy and mild moderate neural foraminal stenosis bilaterally.    This report was flagged in Epic as abnormal.      Impression       Multilevel degenerative change lumbar spine most pronounced at L4/L5 with posterior disc osteophyte with ligamentum flavum hypertrophy and small annular fissure there is mild central canal and neural foraminal stenosis.    There is heterogeneous T1 bone marrow signal throughout the lumbosacral spine most compatible with red marrow reconversion clinical correlation for possible underlying anemia.      Electronically signed by: Renny No DO  Date: 05/31/2018  Time: 11:50           MRI Pelvis Without Contrast   Order: [id number]   Status:  Final result   Visible to patient:  No (Not Released)   Next appt:  None   Dx:  Lumbar radiculopathy   Details     Reading Physician Reading Date Result Priority   Kiko Dumont MD 7/11/2018    SONIDO Benson 7/11/2018       Narrative     EXAMINATION:  MRI PELVIS WITHOUT CONTRAST    CLINICAL HISTORY:  RLE radiculopathy, MRI lumbar spine normal, eval for sciatic nerve compression;  Radiculopathy, lumbar region    TECHNIQUE:  Multisequence, multiplanar MRI of bilateral hips performed without contrast.    COMPARISON:  MRI lumbar spine 05/31/2018.    FINDINGS:  Lumbosacral plexus: Unremarkable.  Specifically, sciatic nerves demonstrate normal course and caliber without evidence for compression.  Piriformis muscles are symmetric.    Bones: No fractures.  Heterogeneous T1 signal of the visualized bone marrow compatible with red marrow reconversion.    Miscellaneous: Limited evaluation of pelvic soft tissues is unremarkable.      Impression       1. Evaluation of the sciatic nerves is unremarkable.  2. Heterogeneous T1 signal of the visualized bone marrow most compatible with red marrow reconversion.    Electronically signed by resident: Nikolai Montes  Date: 07/11/2018  Time:  "15:28    Electronically signed by: Kiko Dumont MD  Date: 07/11/2018  Time: 15:41                    Objective:   BP (!) 156/82   Pulse 68   Temp 98.6 °F (37 °C)   Ht 5' 9" (1.753 m)   Wt 65.7 kg (144 lb 13.5 oz)   BMI 21.39 kg/m²   Pain Disability Index Review:  Last 3 PDI Scores 8/22/2018   Pain Disability Index (PDI) 50     Normocephalic.  Atraumatic.  Affect appropriate.  Breathing unlabored.  Extra ocular muscles intact.       BP (!) 156/82   Pulse 68   Temp 98.6 °F (37 °C)   Ht 5' 9" (1.753 m)   Wt 65.7 kg (144 lb 13.5 oz)   BMI 21.39 kg/m²     PHYSICAL EXAMINATION:  GEN:  Well developed, well nourished.  No acute distress. Normal pain behavior.  HEENT:  No trauma.  Mucous membranes moist.  Nares patent bilaterally.  PSYCH: Normal affect. Thought content appropriate.  CHEST:  Breathing symmetric.  No audible wheezing.  ABD: Soft, non-tender, non-distended.  SKIN:  Warm, pink, dry.  No rash on exposed areas.    EXT:  No cyanosis, clubbing, or edema.  No color change or changes in nail or hair growth.    NEURO/MUSCULOSKELETAL:  Fully alert, oriented, and appropriate. Speech normal mireya. No cranial nerve deficits.   Gait: Normal.   Strength: 5/5 motor strength throughout bilateral upper and lower extremities myotomes.   Sensory: No sensory deficit in the lower extremities dermatomes.   Reflexes:  2+ and symmetric throughout.  Downgoing Babinski's bilaterally, negative wni's.  No clonus or spasticity.    L-Spine:  Normal ROM without pain. - facet loading bilaterally.  - SLR bilaterally.    No TTP over lumbar paraspinals    SI Joint/Hip: + LOLI on right. + FADIR on right. Limited internal and external rotation of right hip.  No TTP over bilateral SI joints, hips, piriformis muscles, or GTB.        Ortho/SPM Exam      Assessment:       Encounter Diagnoses   Name Primary?    DDD (degenerative disc disease), lumbar Yes    Osteoarthritis of spine with radiculopathy, lumbar region          Plan: "   We discussed with the patient the assessment and recommendations. The following is the plan we agreed on:        Nacho was seen today for leg pain.    Diagnoses and all orders for this visit:    DDD (degenerative disc disease), lumbar    Osteoarthritis of spine with radiculopathy, lumbar region    1. Will Schedule for right L4 and S1 TF PAVITHRA.  2. RTC 1 month following procedure.    Hugo Neal   [facility]   On review of the MRI I think the L5-S1 disc is in contact with the S1 root.  We will see of this injection and S1 as well as the L4 help his symptomatology.  I have personally taken the history and examined this patient and agree with the resident's note as stated above.

## 2018-08-30 ENCOUNTER — HOSPITAL ENCOUNTER (OUTPATIENT)
Facility: OTHER | Age: 58
Discharge: HOME | End: 2018-08-30
Attending: ANESTHESIOLOGY | Admitting: ANESTHESIOLOGY
Payer: COMMERCIAL

## 2018-08-30 VITALS
SYSTOLIC BLOOD PRESSURE: 150 MMHG | HEART RATE: 79 BPM | OXYGEN SATURATION: 100 % | BODY MASS INDEX: 21.48 KG/M2 | TEMPERATURE: 99 F | WEIGHT: 145 LBS | DIASTOLIC BLOOD PRESSURE: 88 MMHG | RESPIRATION RATE: 18 BRPM | HEIGHT: 69 IN

## 2018-08-30 DIAGNOSIS — M51.37 DDD (DEGENERATIVE DISC DISEASE), LUMBOSACRAL: ICD-10-CM

## 2018-08-30 DIAGNOSIS — M54.16 LUMBAR RADICULOPATHY: Primary | ICD-10-CM

## 2018-08-30 DIAGNOSIS — M47.819 SPONDYLOSIS WITHOUT MYELOPATHY: ICD-10-CM

## 2018-08-30 PROCEDURE — 64484 NJX AA&/STRD TFRM EPI L/S EA: CPT | Performed by: ANESTHESIOLOGY

## 2018-08-30 PROCEDURE — 63600175 PHARM REV CODE 636 W HCPCS: Performed by: ANESTHESIOLOGY

## 2018-08-30 PROCEDURE — 25000003 PHARM REV CODE 250: Performed by: ANESTHESIOLOGY

## 2018-08-30 PROCEDURE — 25500020 PHARM REV CODE 255: Performed by: ANESTHESIOLOGY

## 2018-08-30 PROCEDURE — 64483 NJX AA&/STRD TFRM EPI L/S 1: CPT | Performed by: ANESTHESIOLOGY

## 2018-08-30 PROCEDURE — 62323 NJX INTERLAMINAR LMBR/SAC: CPT | Mod: ,,, | Performed by: ANESTHESIOLOGY

## 2018-08-30 RX ORDER — SODIUM CHLORIDE 9 MG/ML
500 INJECTION, SOLUTION INTRAVENOUS CONTINUOUS
Status: DISCONTINUED | OUTPATIENT
Start: 2018-08-30 | End: 2018-08-30 | Stop reason: HOSPADM

## 2018-08-30 RX ORDER — LIDOCAINE HYDROCHLORIDE 10 MG/ML
INJECTION INFILTRATION; PERINEURAL
Status: DISCONTINUED | OUTPATIENT
Start: 2018-08-30 | End: 2018-08-30 | Stop reason: HOSPADM

## 2018-08-30 RX ORDER — BUPIVACAINE HYDROCHLORIDE 2.5 MG/ML
INJECTION, SOLUTION EPIDURAL; INFILTRATION; INTRACAUDAL
Status: DISCONTINUED | OUTPATIENT
Start: 2018-08-30 | End: 2018-08-30 | Stop reason: HOSPADM

## 2018-08-30 RX ORDER — DEXAMETHASONE SODIUM PHOSPHATE 100 MG/10ML
INJECTION INTRAMUSCULAR; INTRAVENOUS
Status: DISCONTINUED | OUTPATIENT
Start: 2018-08-30 | End: 2018-08-30 | Stop reason: HOSPADM

## 2018-08-30 RX ORDER — ALPRAZOLAM 0.5 MG/1
1 TABLET, ORALLY DISINTEGRATING ORAL
Status: DISCONTINUED | OUTPATIENT
Start: 2018-08-30 | End: 2018-08-30 | Stop reason: HOSPADM

## 2018-08-30 RX ADMIN — ALPRAZOLAM 1 MG: 0.5 TABLET, ORALLY DISINTEGRATING ORAL at 08:08

## 2018-08-30 NOTE — OP NOTE
Lumbar Interlaminar Epidural Steroid Injection under Fluoroscopic Guidance.  Time-out taken to identify patient and procedure side prior to starting the procedure.   I attest that I have reviewed the patient's home medications prior to the procedure and no contraindication have been identified. I  re-evaluated the patient after the patient was positioned for the procedure in the procedure room immediately before the procedural time-out. The vital signs are current and represent the current state of the patient which has not significantly changed since the preprocedure assessment.                                                               Date of Service: 08/30/2018    PCP: Nasrin Benz PA-C    Referring Physician:    Procedure:  L 4-5 Interlaminar epidural steroid injection under fluoroscopic guidance.    Reasons for procedure: Lumbar radiculopathy [M54.16]   1. Lumbar radiculopathy    2. DDD (degenerative disc disease), lumbosacral    3. Spondylosis without myelopathy      POSTOP DIAGNOSIS:  Lumbar radiculopathy [M54.16]     1. Lumbar radiculopathy    2. DDD (degenerative disc disease), lumbosacral    3. Spondylosis without myelopathy      Physician: Pascual Yadav MD  ASSISTANTS: none    Medications injected: Preservative-free dexamethasone 10mg with 4mL of sterile Xylocaine-MPF 1% and 1ml of sterile preservative-free normal saline.    Local anesthetic injected:    Xylocaine 1% 9ml with Sodium Bicarbonate 1ml.   Sedation Medications: None    Estimated blood loss:  none.    Complications:  none.    Technique:  With the patient laying in a prone position, the area was prepped and draped in the usual sterile fashion using ChloraPrep and a fenestrated drape.  Local anesthetic was given using a 27-gauge needle by raising a wheal and going down to the hub of the needle.  A 3.5 inch 20-gauge Touhy needle was introduced under fluoroscopic guidance.  It met the lamina of the posterior element. The needle was then hinged  above the lamina.  Loss of resistance technique was employed while advancing the needle.  Once in the desired position, contrast dye Omnipaque was injected to confirm placement and there was no vascular runoff.  Digital subtraction was employed to confirm that there was no vascular runoff.  The medication was then injected slowly.  The patient tolerated the procedure well.      Pain before the procedure: 10/10    Pain after the procedure: 0/10    The patient was monitored after the procedure.   They were given post-procedure and discharge instructions to follow at home.  The patient was discharged in a stable condition.

## 2018-08-30 NOTE — PLAN OF CARE
Pt AAOx3, pt able to tolerate PO intake. Pt able to stand and ambulate independently. Pt denies pain at this time, bandaids CDI.

## 2018-08-30 NOTE — DISCHARGE INSTRUCTIONS
Thank you for allowing us to care for you today. You may receive a survey about the care we provided. Your feedback is valuable and helps us provide excellent care throughout the community.     Home Care Instructions for Pain Management:    1. DIET:   You may resume your normal diet today.   2. BATHING:   You may shower with luke warm water. No tub baths or anything that will soak injection sites under water for the next 24 hours.  3. DRESSING:   You may remove your bandage today.   4. ACTIVITY LEVEL:   You may resume your normal activities 24 hrs after your procedure. Nothing strenuous today.  5. MEDICATIONS:   You may resume your normal medications today. To restart blood thinners, ask your doctor.  6. DRIVING    If you have received any sedatives by mouth today, you may not drive for 12 hours.    If you have received any sedation through your IV, you may not drive for 24 hrs.   7. SPECIAL INSTRUCTIONS:   No heat to the injection site for 24 hrs including, hot bath or shower, heating pad, moist heat, or hot tubs.    Use ice pack to injection site for any pain or discomfort.  Apply ice packs for 20 minute intervals as needed.    IF you have diabetes, be sure to monitor your blood sugar more closely. IF your injection contained steroids your blood sugar levels may become higher than normal.    If you are still having pain upon discharge:  Your pain may improve over the next 48 hours. The anesthetic (numbing medication) works immediately to 48 hours. IF your injection contained a steroid (anti-inflammatory medication), it takes approximately 3 days to start feeling relief and 7-10 days to see your greatest results from the medication. It is possible you may need subsequent injections. This would be discussed at your follow up appointment with pain management or your referring doctor.      PLEASE CALL YOUR DOCTOR IF:  1. Redness or swelling around the injection site.  2. Fever of 101 degrees or more  3. Drainage  (pus) from the injection site.  4. For any continuous bleeding (some dried blood over the incision is normal.)    FOR EMERGENCIES:   If any unusual problems or difficulties occur during clinic hours, call [phone] or [phone].

## 2018-10-05 ENCOUNTER — OFFICE VISIT (OUTPATIENT)
Dept: PAIN MEDICINE | Facility: CLINIC | Age: 58
End: 2018-10-05
Payer: COMMERCIAL

## 2018-10-05 VITALS
WEIGHT: 147 LBS | RESPIRATION RATE: 18 BRPM | HEIGHT: 69 IN | BODY MASS INDEX: 21.77 KG/M2 | SYSTOLIC BLOOD PRESSURE: 150 MMHG | TEMPERATURE: 99 F | DIASTOLIC BLOOD PRESSURE: 90 MMHG | HEART RATE: 65 BPM

## 2018-10-05 DIAGNOSIS — M47.26 OSTEOARTHRITIS OF SPINE WITH RADICULOPATHY, LUMBAR REGION: ICD-10-CM

## 2018-10-05 DIAGNOSIS — M51.36 DDD (DEGENERATIVE DISC DISEASE), LUMBAR: ICD-10-CM

## 2018-10-05 DIAGNOSIS — M54.16 LUMBAR RADICULOPATHY: Primary | ICD-10-CM

## 2018-10-05 DIAGNOSIS — M51.37 DDD (DEGENERATIVE DISC DISEASE), LUMBOSACRAL: ICD-10-CM

## 2018-10-05 PROCEDURE — 3008F BODY MASS INDEX DOCD: CPT | Mod: CPTII,S$GLB,, | Performed by: NURSE PRACTITIONER

## 2018-10-05 PROCEDURE — 99999 PR PBB SHADOW E&M-EST. PATIENT-LVL III: CPT | Mod: PBBFAC,,, | Performed by: NURSE PRACTITIONER

## 2018-10-05 PROCEDURE — 99213 OFFICE O/P EST LOW 20 MIN: CPT | Mod: S$GLB,,, | Performed by: NURSE PRACTITIONER

## 2018-10-05 RX ORDER — GABAPENTIN 300 MG/1
CAPSULE ORAL
Qty: 120 CAPSULE | Refills: 2 | Status: SHIPPED | OUTPATIENT
Start: 2018-10-05 | End: 2019-01-21 | Stop reason: DRUGHIGH

## 2018-10-05 NOTE — PROGRESS NOTES
Chronic patient Established Note (Follow up visit)      SUBJECTIVE:    Nacho Liu presents to the clinic for a follow-up appointment for low back and leg pain. He is s/p right L4 and S1 TF PAVITHRA on 8/30/2018. He reports 80% relief of his pain for 3 days. Then his pain has gradually returned. He continues to report low back pain that radiates down side and back of his right leg to under his foot. He denies any right leg pain. He describes this pain as burning in nature. His pain is worse with prolonged standing. He continues to take Gabapentin 300 mg TID with some benefit. He denies any other health changes. Since the last visit, Nacho Liu states the pain has been persistant. Current pain intensity is 8/10.    Pain Disability Index Review:  Last 3 PDI Scores 10/5/2018 8/22/2018   Pain Disability Index (PDI) 62 50       Pain Medications:  Gabapentin 300 mg TID    Opioid Contract: no     report:  Reviewed and consistent with medication use as prescribed.    Pain Procedures:   2 previous ESIs at [facility] in 2018  8/30/2018- Right L4 and S1 TF PAVITHRA     Physical Therapy/Home Exercise: no    Imaging:   MRI Lumbar Spine 5/31/2018:  FINDINGS:  There is straightening of the normal lumbar lordosis.  Heterogeneous T1 bone marrow signal throughout the lumbar spine without focal bone marrow edema.  Overall concerning for  red marrow reconversion clinical correlation for underlying chronic anemia, no evidence for focal infiltrative process.    There is scattered endplate degeneration most prominent at L4/L5 level with superimposed disc desiccation and height loss.    The distal spinal cord and conus is normal in signal and contour tip of the conus approximates the inferior L1 level.    No aneurysmal dilatation of the visualized abdominal aorta.    T12/L1 through L1/L2: No significant disc bulge, central canal or neural foraminal stenosis.    L2/L3: No significant disc bulge central canal or neural foraminal  stenosis.    L3/L4:Small posterior disc osteophyte without significant central canal or neural foraminal stenosis    L4/L5:Posterior disc osteophyte with ligamentum flavum hypertrophy and small annular fissure with mild central canal stenosis and mild neural foraminal narrowing.    L5/S1: Small posterior disc osteophyte with ligamentum flavum hypertrophy without significant central canal stenosis with attenuation of the thecal sac related to epidural lipomatosis.  There is facet joint arthropathy and mild moderate neural foraminal stenosis bilaterally.    This report was flagged in Epic as abnormal.      Impression       Multilevel degenerative change lumbar spine most pronounced at L4/L5 with posterior disc osteophyte with ligamentum flavum hypertrophy and small annular fissure there is mild central canal and neural foraminal stenosis.    There is heterogeneous T1 bone marrow signal throughout the lumbosacral spine most compatible with red marrow reconversion clinical correlation for possible underlying anemia.         Allergies:   Review of patient's allergies indicates:   Allergen Reactions    Lipitor [atorvastatin] Swelling       Current Medications:   Current Outpatient Medications   Medication Sig Dispense Refill    gabapentin (NEURONTIN) 300 MG capsule 1 po q hs x 3 days, then 1 po bid x 3 days, then 1 po tid. 90 capsule 2    hydroCHLOROthiazide (HYDRODIURIL) 25 MG tablet       methylPREDNISolone (MEDROL DOSEPACK) 4 mg tablet use as directed x 6 days 1 Package 0    pregabalin (LYRICA) 75 MG capsule Take 75 mg by mouth 2 (two) times daily.       No current facility-administered medications for this visit.        REVIEW OF SYSTEMS:    GENERAL:  No weight loss, malaise or fevers.  HEENT:  Negative for frequent or significant headaches.  NECK:  Negative for lumps, goiter, pain and significant neck swelling.  RESPIRATORY:  Negative for cough, wheezing or shortness of breath.  CARDIOVASCULAR:  Negative for  "chest pain, leg swelling or palpitations. HTN  GI:  Negative for abdominal discomfort, blood in stools or black stools or change in bowel habits.  MUSCULOSKELETAL:  See HPI.  SKIN:  Negative for lesions, rash, and itching.  PSYCH:  Negative for sleep disturbance, mood disorder and recent psychosocial stressors.  HEMATOLOGY/LYMPHOLOGY:  Negative for prolonged bleeding, bruising easily or swollen nodes.  NEURO:   No history of headaches, syncope, paralysis, seizures or tremors.  All other reviewed and negative other than HPI.    Past Medical History:  Past Medical History:   Diagnosis Date    Heart murmur     Hypertension        Past Surgical History:  Past Surgical History:   Procedure Laterality Date    Injection,steroid,epidural,transforaminal approach, RIGHT L4 and S1 TF PAVITHRA WITH BUPIVACAINE Right 8/30/2018    Performed by Pascual Yadav MD at [facility], [place], operated by [facility]    KNEE SURGERY         Family History:  Family History   Problem Relation Age of Onset    Arthritis Mother     Cirrhosis Father        Social History:  Social History     Socioeconomic History    Marital status:      Spouse name: None    Number of children: None    Years of education: None    Highest education level: None   Social Needs    Financial resource strain: None    Food insecurity - worry: None    Food insecurity - inability: None    Transportation needs - medical: None    Transportation needs - non-medical: None   Occupational History    None   Tobacco Use    Smoking status: Never Smoker    Smokeless tobacco: Never Used   Substance and Sexual Activity    Alcohol use: Yes    Drug use: No    Sexual activity: None   Other Topics Concern    None   Social History Narrative    None       OBJECTIVE:    BP (!) 150/90   Pulse 65   Temp 98.5 °F (36.9 °C)   Resp 18   Ht 5' 9" (1.753 m)   Wt 66.7 kg (147 lb)   BMI 21.71 kg/m²     PHYSICAL EXAMINATION:    General appearance: Well appearing, in no acute distress, alert and " oriented x3.  Psych:  Mood and affect appropriate.  Skin: Skin color, texture, turgor normal, no rashes or lesions, in both upper and lower body.  Head/face:  Atraumatic, normocephalic. No palpable lymph nodes.  Cor: RRR  Pulm: CTA  GI: Abdomen soft and non-tender.  Back: Straight leg raising in the sitting position is positive to radicular pain in the L5 and S1 distribution on the right, negative on the left. No pain to palpation over the lumbar spine. Limited ROM with pain on extension. Negative facet loading bilaterally.   Extremities: Peripheral joint ROM is full and pain free without obvious instability or laxity in all four extremities. No deformities, edema, or skin discoloration. Good capillary refill.  Musculoskeletal: Shoulder, hip, sacroiliac and knee provocative maneuvers are negative. Bilateral lower extremity strength is normal and symmetric.  No atrophy or tone abnormalities are noted.  Neuro: Bilateral lower extremity coordination and muscle stretch reflexes are physiologic and symmetric.  Plantar response are downgoing. Decreased sensation to right calf and foot.   Gait: Antalgic- ambulates without assistance.     ASSESSMENT: 57 y.o. year old male with low back and leg pain, consistent with the followin. Lumbar radiculopathy  gabapentin (NEURONTIN) 300 MG capsule   2. DDD (degenerative disc disease), lumbosacral  gabapentin (NEURONTIN) 300 MG capsule   3. Osteoarthritis of spine with radiculopathy, lumbar region  gabapentin (NEURONTIN) 300 MG capsule         PLAN:     - Previous imaging was reviewed and discussed with the patient today.    - Schedule for right L5 and S1 TF PAVITHRA.   The procedure, risks, benefits and options were discussed with patient. There are no contraindications to the procedure. The patient expressed understanding and agreed to proceed.      - Increased Gabapentin to 300 mg in the morning, 300 mg in the afternoon, and 600 mg at bedtime.     - I have stressed the  importance of physical activity and a home exercise plan to help with pain and improve health.    - We discussed the MRI finding of increased signal in bone marrow which could be a sign of anemia. I encouraged him to follow up with his PCP.     - RTC 2 weeks after above procedure.     - Counseled patient regarding the importance of activity modification and constant sleeping habits.    The above plan and management options were discussed at length with patient. Patient is in agreement with the above and verbalized understanding.    Ella Herrera NP  10/05/2018

## 2018-10-29 ENCOUNTER — HOSPITAL ENCOUNTER (OUTPATIENT)
Facility: OTHER | Age: 58
Discharge: HOME | End: 2018-10-29
Attending: ANESTHESIOLOGY | Admitting: ANESTHESIOLOGY
Payer: COMMERCIAL

## 2018-10-29 VITALS
SYSTOLIC BLOOD PRESSURE: 140 MMHG | DIASTOLIC BLOOD PRESSURE: 93 MMHG | HEART RATE: 84 BPM | HEIGHT: 69 IN | RESPIRATION RATE: 18 BRPM | TEMPERATURE: 99 F | BODY MASS INDEX: 21.48 KG/M2 | OXYGEN SATURATION: 100 % | WEIGHT: 145 LBS

## 2018-10-29 DIAGNOSIS — M54.16 LUMBAR RADICULOPATHY: Primary | ICD-10-CM

## 2018-10-29 PROCEDURE — 25500020 PHARM REV CODE 255: Performed by: ANESTHESIOLOGY

## 2018-10-29 PROCEDURE — 64484 NJX AA&/STRD TFRM EPI L/S EA: CPT | Performed by: ANESTHESIOLOGY

## 2018-10-29 PROCEDURE — 64483 NJX AA&/STRD TFRM EPI L/S 1: CPT | Mod: RT,,, | Performed by: ANESTHESIOLOGY

## 2018-10-29 PROCEDURE — 64484 NJX AA&/STRD TFRM EPI L/S EA: CPT | Mod: RT,,, | Performed by: ANESTHESIOLOGY

## 2018-10-29 PROCEDURE — 64483 NJX AA&/STRD TFRM EPI L/S 1: CPT | Performed by: ANESTHESIOLOGY

## 2018-10-29 PROCEDURE — 25000003 PHARM REV CODE 250: Performed by: ANESTHESIOLOGY

## 2018-10-29 PROCEDURE — 63600175 PHARM REV CODE 636 W HCPCS: Performed by: ANESTHESIOLOGY

## 2018-10-29 RX ORDER — ALPRAZOLAM 0.5 MG/1
1 TABLET, ORALLY DISINTEGRATING ORAL
Status: COMPLETED | OUTPATIENT
Start: 2018-10-29 | End: 2018-10-29

## 2018-10-29 RX ORDER — LIDOCAINE HYDROCHLORIDE 10 MG/ML
INJECTION, SOLUTION EPIDURAL; INFILTRATION; INTRACAUDAL; PERINEURAL
Status: DISCONTINUED | OUTPATIENT
Start: 2018-10-29 | End: 2018-10-29 | Stop reason: HOSPADM

## 2018-10-29 RX ORDER — DEXAMETHASONE SODIUM PHOSPHATE 100 MG/10ML
INJECTION INTRAMUSCULAR; INTRAVENOUS
Status: DISCONTINUED | OUTPATIENT
Start: 2018-10-29 | End: 2018-10-29 | Stop reason: HOSPADM

## 2018-10-29 RX ORDER — SODIUM CHLORIDE 9 MG/ML
500 INJECTION, SOLUTION INTRAVENOUS CONTINUOUS
Status: DISCONTINUED | OUTPATIENT
Start: 2018-10-29 | End: 2018-10-29 | Stop reason: HOSPADM

## 2018-10-29 RX ADMIN — ALPRAZOLAM 1 MG: 0.5 TABLET, ORALLY DISINTEGRATING ORAL at 10:10

## 2018-10-29 NOTE — OP NOTE
Date of Service: 10/29/2018    PCP: Nasrin Benz PA-C    Referring Physician:    Time-out taken to identify patient and procedure side prior to starting the procedure.   I attest that I have reviewed the patient's home medications prior to the procedure and no contraindication have been identified. I  re-evaluated the patient after the patient was positioned for the procedure in the procedure room immediately before the procedural time-out. The vital signs are current and represent the current state of the patient which has not significantly changed since the preprocedure assessment.                                                           PROCEDURE: Right L5, S1 transforaminal epidural steroid injection under fluoroscopy    REASON FOR PROCEDURE: Right Lumbar radiculopathy [M54.16]  DDD (degenerative disc disease), lumbar [M51.36]  1. Lumbar radiculopathy      POSTPROCEDURE DIAGNOSIS:   Lumbar radiculopathy [M54.16]  DDD (degenerative disc disease), lumbar [M51.36]    1. Lumbar radiculopathy           PHYSICIAN: Pascual Yadav MD  ASSISTANTS:none    MEDICATIONS INJECTED:  Preservative-free dexamethasone 10mg, Xylocaine 1% MPF 3-5ml. 3ml per level. Preservative free, sterile normal saline is used to get larger volume as needed.  LOCAL ANESTHETIC INJECTED:  Xylocaine 1% 9ml with Sodium Bicarbonate 1ml. 3ml per site.    SEDATION MEDICATIONS: None  ESTIMATED BLOOD LOSS:  None.    COMPLICATIONS:  None.    TECHNIQUE:   Laying in a prone position, the patient was prepped and draped in the usual sterile fashion using ChloraPrep and fenestrated drape.  The area to be injected was determined under fluoroscopic guidance.  Local anesthetic was given by raising a wheel and going down to the hub of a 27-gauge 1.25 inch needle.  The 3.5inch 22-gauge spinal needle was introduced towards the transverse process of each above named nerve root level.  The needle was walked medially then hinged into the neural foramen.  Omnipaque was  injected to confirm appropriate placement and that there was no vascular runoff.  The medication was then injected after applying negative pressure. The patient tolerated the procedure well.    PAIN BEFORE THE PROCEDURE: 10/10.    PAIN AFTER THE PROCEDURE: 4/10.    The patient was monitored after the procedure.  Patient was given post procedure and discharge instructions to follow at home.  We will see the patient back in two weeks or the patient may call to inform of status. The patient was discharged in a stable condition.

## 2018-10-29 NOTE — DISCHARGE INSTRUCTIONS
Thank you for allowing us to care for you today. You may receive a survey about the care we provided. Your feedback is valuable and helps us provide excellent care throughout the community.     Home Care Instructions for Pain Management:    1. DIET:   You may resume your normal diet today.   2. BATHING:   You may shower with luke warm water. No tub baths or anything that will soak injection sites under water for the next 24 hours.  3. DRESSING:   You may remove your bandage today.   4. ACTIVITY LEVEL:   You may resume your normal activities 24 hrs after your procedure. Nothing strenuous today.  5. MEDICATIONS:   You may resume your normal medications today. To restart blood thinners, ask your doctor.  6. DRIVING    If you have received any sedatives by mouth today, you may not drive for 12 hours.    If you have received any sedation through your IV, you may not drive for 24 hrs.   7. SPECIAL INSTRUCTIONS:   No heat to the injection site for 24 hrs including, hot bath or shower, heating pad, moist heat, or hot tubs.    Use ice pack to injection site for any pain or discomfort.  Apply ice packs for 20 minute intervals as needed.    IF you have diabetes, be sure to monitor your blood sugar more closely. IF your injection contained steroids your blood sugar levels may become higher than normal.    If you are still having pain upon discharge:  Your pain may improve over the next 48 hours. The anesthetic (numbing medication) works immediately to 48 hours. IF your injection contained a steroid (anti-inflammatory medication), it takes approximately 3 days to start feeling relief and 7-10 days to see your greatest results from the medication. It is possible you may need subsequent injections. This would be discussed at your follow up appointment with pain management or your referring doctor.      PLEASE CALL YOUR DOCTOR IF:  1. Redness or swelling around the injection site.  2. Fever of 101 degrees or more  3. Drainage  (pus) from the injection site.  4. For any continuous bleeding (some dried blood over the incision is normal.)    FOR EMERGENCIES:   If any unusual problems or difficulties occur during clinic hours, call [phone] or [phone].

## 2018-11-12 ENCOUNTER — OFFICE VISIT (OUTPATIENT)
Dept: PAIN MEDICINE | Facility: CLINIC | Age: 58
End: 2018-11-12
Payer: COMMERCIAL

## 2018-11-12 VITALS
HEART RATE: 74 BPM | SYSTOLIC BLOOD PRESSURE: 162 MMHG | HEIGHT: 69 IN | RESPIRATION RATE: 18 BRPM | WEIGHT: 149.94 LBS | TEMPERATURE: 98 F | DIASTOLIC BLOOD PRESSURE: 93 MMHG | BODY MASS INDEX: 22.21 KG/M2

## 2018-11-12 DIAGNOSIS — M47.26 OSTEOARTHRITIS OF SPINE WITH RADICULOPATHY, LUMBAR REGION: Primary | ICD-10-CM

## 2018-11-12 DIAGNOSIS — M51.36 DDD (DEGENERATIVE DISC DISEASE), LUMBAR: ICD-10-CM

## 2018-11-12 DIAGNOSIS — M47.816 LUMBAR SPONDYLOSIS: Primary | ICD-10-CM

## 2018-11-12 DIAGNOSIS — M54.16 LUMBAR RADICULOPATHY: ICD-10-CM

## 2018-11-12 DIAGNOSIS — M47.816 FACET ARTHRITIS OF LUMBAR REGION: ICD-10-CM

## 2018-11-12 PROCEDURE — 99213 OFFICE O/P EST LOW 20 MIN: CPT | Mod: S$GLB,,, | Performed by: NURSE PRACTITIONER

## 2018-11-12 PROCEDURE — 3008F BODY MASS INDEX DOCD: CPT | Mod: CPTII,S$GLB,, | Performed by: NURSE PRACTITIONER

## 2018-11-12 PROCEDURE — 99999 PR PBB SHADOW E&M-EST. PATIENT-LVL III: CPT | Mod: PBBFAC,,, | Performed by: NURSE PRACTITIONER

## 2018-11-12 NOTE — PROGRESS NOTES
Chronic patient Established Note (Follow up visit)      SUBJECTIVE:    Nacho Liu presents to the clinic for a follow-up appointment for low back and leg pain. He is s/p right L5 and S1 TF PAVITHRA on 10/29/2018. He reports 90% relief for approximately 2-3 days. Since then his pain as returned to baseline. He reports low back pain that is aching in nature. His back pain is worse with prolonged sitting and standing. He continues to report radiating pain down the back of his right leg to under his foot. He denies any left leg pain. He continues to take Gabapentin with benefit. He denies any other health changes. He denies any bowel or bladder incontinence. His pain today is 8/10.      Pain Disability Index Review:  Last 3 PDI Scores 11/12/2018 10/5/2018 8/22/2018   Pain Disability Index (PDI) 40 62 50       Pain Medications:  Gabapentin 300 mg TID    Opioid Contract: no     report:  Reviewed and consistent with medication use as prescribed.    Pain Procedures:   2 previous ESIs at [facility] in 2018  8/30/2018- Right L4 and S1 TF PAVITHRA   10/29/2018- Right L5 and S1 TF PAVITHRA    Physical Therapy/Home Exercise: no    Imaging:   MRI Lumbar Spine 5/31/2018:  FINDINGS:  There is straightening of the normal lumbar lordosis.  Heterogeneous T1 bone marrow signal throughout the lumbar spine without focal bone marrow edema.  Overall concerning for  red marrow reconversion clinical correlation for underlying chronic anemia, no evidence for focal infiltrative process.    There is scattered endplate degeneration most prominent at L4/L5 level with superimposed disc desiccation and height loss.    The distal spinal cord and conus is normal in signal and contour tip of the conus approximates the inferior L1 level.    No aneurysmal dilatation of the visualized abdominal aorta.    T12/L1 through L1/L2: No significant disc bulge, central canal or neural foraminal stenosis.    L2/L3: No significant disc bulge central canal or neural foraminal  stenosis.    L3/L4:Small posterior disc osteophyte without significant central canal or neural foraminal stenosis    L4/L5:Posterior disc osteophyte with ligamentum flavum hypertrophy and small annular fissure with mild central canal stenosis and mild neural foraminal narrowing.    L5/S1: Small posterior disc osteophyte with ligamentum flavum hypertrophy without significant central canal stenosis with attenuation of the thecal sac related to epidural lipomatosis.  There is facet joint arthropathy and mild moderate neural foraminal stenosis bilaterally.    This report was flagged in Epic as abnormal.      Impression       Multilevel degenerative change lumbar spine most pronounced at L4/L5 with posterior disc osteophyte with ligamentum flavum hypertrophy and small annular fissure there is mild central canal and neural foraminal stenosis.    There is heterogeneous T1 bone marrow signal throughout the lumbosacral spine most compatible with red marrow reconversion clinical correlation for possible underlying anemia.     EMG 7/5/2018:  Normal study    Allergies:   Review of patient's allergies indicates:   Allergen Reactions    Lipitor [atorvastatin] Swelling       Current Medications:   Current Outpatient Medications   Medication Sig Dispense Refill    gabapentin (NEURONTIN) 300 MG capsule Take 300 mg in AM, take 300 mg in the afternoon, take 600 mg at bedtime 120 capsule 2    hydroCHLOROthiazide (HYDRODIURIL) 25 MG tablet        No current facility-administered medications for this visit.        REVIEW OF SYSTEMS:    GENERAL:  No weight loss, malaise or fevers.  HEENT:  Negative for frequent or significant headaches.  NECK:  Negative for lumps, goiter, pain and significant neck swelling.  RESPIRATORY:  Negative for cough, wheezing or shortness of breath.  CARDIOVASCULAR:  Negative for chest pain, leg swelling or palpitations. HTN  GI:  Negative for abdominal discomfort, blood in stools or black stools or change in  "bowel habits.  MUSCULOSKELETAL:  See HPI.  SKIN:  Negative for lesions, rash, and itching.  PSYCH:  Negative for sleep disturbance, mood disorder and recent psychosocial stressors.  HEMATOLOGY/LYMPHOLOGY:  Negative for prolonged bleeding, bruising easily or swollen nodes.  NEURO:   No history of headaches, syncope, paralysis, seizures or tremors.  All other reviewed and negative other than HPI.    Past Medical History:  Past Medical History:   Diagnosis Date    Heart murmur     Hypertension        Past Surgical History:  Past Surgical History:   Procedure Laterality Date    Injection,steroid,epidural,transforaminal approach RIGHT L5 AND S1 TF PAVITHRA Right 10/29/2018    Performed by Pascual Yadav MD at [facility]    Injection,steroid,epidural,transforaminal approach, RIGHT L4 and S1 TF PAVITHRA WITH BUPIVACAINE Right 8/30/2018    Performed by Pascual Yadav MD at [facility]    KNEE SURGERY         Family History:  Family History   Problem Relation Age of Onset    Arthritis Mother     Cirrhosis Father        Social History:  Social History     Socioeconomic History    Marital status:      Spouse name: Not on file    Number of children: Not on file    Years of education: Not on file    Highest education level: Not on file   Social Needs    Financial resource strain: Not on file    Food insecurity - worry: Not on file    Food insecurity - inability: Not on file    Transportation needs - medical: Not on file    Transportation needs - non-medical: Not on file   Occupational History    Not on file   Tobacco Use    Smoking status: Never Smoker    Smokeless tobacco: Never Used   Substance and Sexual Activity    Alcohol use: Yes    Drug use: No    Sexual activity: Not on file   Other Topics Concern    Not on file   Social History Narrative    Not on file       OBJECTIVE:    BP (!) 162/93   Pulse 74   Temp 98.4 °F (36.9 °C)   Resp 18   Ht 5' 9" (1.753 m)   Wt 68 kg (149 lb 14.6 oz)   BMI 22.14 " kg/m²     PHYSICAL EXAMINATION:    General appearance: Well appearing, in no acute distress, alert and oriented x3.  Psych:  Mood and affect appropriate.  Skin: Skin color, texture, turgor normal, no rashes or lesions, in both upper and lower body.  Head/face:  Atraumatic, normocephalic. No palpable lymph nodes.  Cor: RRR  Pulm: CTA  GI: Abdomen soft and non-tender.  Back: Straight leg raising in the sitting position is positive to radicular pain in the L5 and S1 distribution on the right, negative on the left. There is pain to palpation over the lumbar spine. Limited ROM with pain on extension. Positive facet loading bilaterally, right greater than left.   Extremities: Peripheral joint ROM is full and pain free without obvious instability or laxity in all four extremities. No deformities, edema, or skin discoloration. Good capillary refill.  Musculoskeletal: Shoulder, hip, sacroiliac and knee provocative maneuvers are negative. Bilateral lower extremity strength is normal and symmetric.  No atrophy or tone abnormalities are noted.  Neuro: Bilateral lower extremity coordination and muscle stretch reflexes are physiologic and symmetric.  Plantar response are downgoing. Decreased sensation to right calf and foot.   Gait: Antalgic- ambulates without assistance.     ASSESSMENT: 57 y.o. year old male with low back and leg pain, consistent with the followin. Osteoarthritis of spine with radiculopathy, lumbar region     2. Facet arthritis of lumbar region     3. DDD (degenerative disc disease), lumbar     4. Lumbar radiculopathy           PLAN:     - Previous imaging was reviewed and discussed with the patient today.    - Schedule for bilateral L3,4,5 MBB.   The procedure, risks, benefits and options were discussed with patient. There are no contraindications to the procedure. The patient expressed understanding and agreed to proceed.      - If significant relief, we will repeat for confirmation then proceed with  RFA one side at a time.     - Continue Gabapentin 300 mg in the morning, 300 mg in the afternoon, and 600 mg at bedtime.     - I have stressed the importance of physical activity and a home exercise plan to help with pain and improve health.    - RTC 2 weeks after above procedure.     - Counseled patient regarding the importance of activity modification and constant sleeping habits.    - Dr. Yadav was consulted on the patient and agrees with this plan.    The above plan and management options were discussed at length with patient. Patient is in agreement with the above and verbalized understanding.    Ella Herrera, ALEJANDRINA  11/12/2018

## 2018-11-28 ENCOUNTER — TELEPHONE (OUTPATIENT)
Dept: PAIN MEDICINE | Facility: CLINIC | Age: 58
End: 2018-11-28

## 2018-11-28 NOTE — TELEPHONE ENCOUNTER
----- Message from Sherie Martinez sent at 11/28/2018  4:31 PM CST -----  Hello, following up on the message regarding the p2p. Has It been completed? If not the  p2p may no longer be available.   ----- Message -----  From: Sherie Martinez  Sent: 11/20/2018   6:48 PM  To: Leif Melgar    Per [employer] portal cpt codes 76372 and 25948 are non authorized at this time as criteria is not met. Please contact [employer] at [phone] option 1 and then 2 ref# [id number]

## 2018-11-28 NOTE — TELEPHONE ENCOUNTER
p2p was not completed. If its over 10 days since the denial they usually won't complete it.     Ella Herrera is the ordering provider and she will be out the next two days. Request will be presented to  ALEJANDRINA Beauchamp to see if she can try to get it completed.

## 2018-11-29 DIAGNOSIS — M47.816 LUMBAR SPONDYLOSIS: Primary | ICD-10-CM

## 2018-11-30 ENCOUNTER — TELEPHONE (OUTPATIENT)
Dept: PAIN MEDICINE | Facility: CLINIC | Age: 58
End: 2018-11-30

## 2018-11-30 NOTE — TELEPHONE ENCOUNTER
----- Message from Sherie Martinez sent at 11/30/2018 12:05 PM CST -----      ----- Message -----  From: Sherie Martinez  Sent: 11/30/2018   8:38 AM  To: ALEJANDRINA Tobin, I resubmitted the auth for Mr Liu please contact [employer] at [phone] option 1 and then 2 to complete a p2p on the new request case ref# is [id number] thanks

## 2018-12-03 ENCOUNTER — HOSPITAL ENCOUNTER (OUTPATIENT)
Facility: OTHER | Age: 58
Discharge: HOME | End: 2018-12-03
Attending: ANESTHESIOLOGY | Admitting: ANESTHESIOLOGY
Payer: COMMERCIAL

## 2018-12-03 VITALS
RESPIRATION RATE: 18 BRPM | HEART RATE: 77 BPM | WEIGHT: 150 LBS | HEIGHT: 69 IN | BODY MASS INDEX: 22.22 KG/M2 | DIASTOLIC BLOOD PRESSURE: 101 MMHG | TEMPERATURE: 99 F | SYSTOLIC BLOOD PRESSURE: 177 MMHG | OXYGEN SATURATION: 100 %

## 2018-12-03 DIAGNOSIS — M47.816 LUMBAR SPONDYLOSIS: Primary | ICD-10-CM

## 2018-12-03 DIAGNOSIS — M54.16 LUMBAR RADICULOPATHY: ICD-10-CM

## 2018-12-03 PROCEDURE — 25000003 PHARM REV CODE 250: Performed by: ANESTHESIOLOGY

## 2018-12-03 PROCEDURE — 64483 NJX AA&/STRD TFRM EPI L/S 1: CPT | Mod: RT,,, | Performed by: ANESTHESIOLOGY

## 2018-12-03 PROCEDURE — 64484 NJX AA&/STRD TFRM EPI L/S EA: CPT | Mod: RT,,, | Performed by: ANESTHESIOLOGY

## 2018-12-03 PROCEDURE — 63600175 PHARM REV CODE 636 W HCPCS: Performed by: ANESTHESIOLOGY

## 2018-12-03 PROCEDURE — 64484 NJX AA&/STRD TFRM EPI L/S EA: CPT | Performed by: ANESTHESIOLOGY

## 2018-12-03 PROCEDURE — 25500020 PHARM REV CODE 255: Performed by: ANESTHESIOLOGY

## 2018-12-03 PROCEDURE — 64483 NJX AA&/STRD TFRM EPI L/S 1: CPT | Performed by: ANESTHESIOLOGY

## 2018-12-03 RX ORDER — LIDOCAINE HYDROCHLORIDE 10 MG/ML
INJECTION INFILTRATION; PERINEURAL
Status: DISCONTINUED | OUTPATIENT
Start: 2018-12-03 | End: 2018-12-03 | Stop reason: HOSPADM

## 2018-12-03 RX ORDER — SODIUM CHLORIDE 9 MG/ML
500 INJECTION, SOLUTION INTRAVENOUS CONTINUOUS
Status: ACTIVE | OUTPATIENT
Start: 2018-12-03

## 2018-12-03 RX ORDER — DEXAMETHASONE SODIUM PHOSPHATE 10 MG/ML
INJECTION INTRAMUSCULAR; INTRAVENOUS
Status: DISCONTINUED | OUTPATIENT
Start: 2018-12-03 | End: 2018-12-03 | Stop reason: HOSPADM

## 2018-12-03 RX ORDER — LIDOCAINE HYDROCHLORIDE 10 MG/ML
INJECTION, SOLUTION EPIDURAL; INFILTRATION; INTRACAUDAL; PERINEURAL
Status: DISCONTINUED | OUTPATIENT
Start: 2018-12-03 | End: 2018-12-03 | Stop reason: HOSPADM

## 2018-12-03 NOTE — PROCEDURES
Date of Service: 12/03/2018    PCP: Nasrin Benz PA-C    Time-out taken to identify patient and procedure side prior to starting the procedure.   I attest that I have reviewed the patient's home medications prior to the procedure and no contraindication have been identified. I  re-evaluated the patient after the patient was positioned for the procedure in the procedure room immediately before the procedural time-out. The vital signs are current and represent the current state of the patient which has not significantly changed since the preprocedure assessment.         At the time of visit, patient was having no back pain and only pain in the right leg. We opted to change the procedure from a MBB to a right L4 and L5 TFESI.                                                   PROCEDURE: Right L4 and L5 transforaminal epidural steroid injection under fluoroscopy    REASON FOR PROCEDURE: Right Lumbar spondylosis [M47.816]  1. Lumbar spondylosis    2. Lumbar radiculopathy      POSTPROCEDURE DIAGNOSIS:   Lumbar spondylosis [M47.816]    1. Lumbar spondylosis    2. Lumbar radiculopathy           PHYSICIAN: Pascual Yadav MD  ASSISTANTS:Adryan More MD    MEDICATIONS INJECTED:  Preservative-free dexamethasone 10mg, Xylocaine 1% MPF 3-5ml. 3ml per level. Preservative free, sterile normal saline is used to get larger volume as needed.  LOCAL ANESTHETIC INJECTED:  Xylocaine 1% 4ml with Sodium Bicarbonate 1ml. 3ml per site.    SEDATION MEDICATIONS: None  ESTIMATED BLOOD LOSS:  None.    COMPLICATIONS:  None.    TECHNIQUE:   Laying in a prone position, the patient was prepped and draped in the usual sterile fashion using ChloraPrep and fenestrated drape.  The area to be injected was determined under fluoroscopic guidance.  Local anesthetic was given by raising a wheel and going down to the hub of a 27-gauge 1.25 inch needle.  The 3.5inch 22-gauge spinal needle was introduced towards the transverse process of each above named nerve  root level.  The needle was walked medially then hinged into the neural foramen.  Omnipaque was injected to confirm appropriate placement and that there was no vascular runoff.  The medication was then injected after applying negative pressure. The patient tolerated the procedure well.    PAIN BEFORE THE PROCEDURE: 8/10.    PAIN AFTER THE PROCEDURE: 0/10.    The patient was monitored after the procedure.  Patient was given post procedure and discharge instructions to follow at home.  We will see the patient back in two weeks or the patient may call to inform of status. The patient was discharged in a stable condition.

## 2018-12-03 NOTE — DISCHARGE INSTRUCTIONS
Thank you for allowing us to care for you today. You may receive a survey about the care we provided. Your feedback is valuable and helps us provide excellent care throughout the community.     Home Care Instructions for Pain Management:    1. DIET:   You may resume your normal diet today.   2. BATHING:   You may shower with luke warm water. No tub baths or anything that will soak injection sites under water for the next 24 hours.  3. DRESSING:   You may remove your bandage today.   4. ACTIVITY LEVEL:   You may resume your normal activities 24 hrs after your procedure. Nothing strenuous today.  5. MEDICATIONS:   You may resume your normal medications today. To restart blood thinners, ask your doctor.  6. DRIVING    If you have received any sedatives by mouth today, you may not drive for 12 hours.    If you have received any sedation through your IV, you may not drive for 24 hrs.   7. SPECIAL INSTRUCTIONS:   No heat to the injection site for 24 hrs including, hot bath or shower, heating pad, moist heat, or hot tubs.    Use ice pack to injection site for any pain or discomfort.  Apply ice packs for 20 minute intervals as needed.    IF you have diabetes, be sure to monitor your blood sugar more closely. IF your injection contained steroids your blood sugar levels may become higher than normal.    If you are still having pain upon discharge:  Your pain may improve over the next 48 hours. The anesthetic (numbing medication) works immediately to 48 hours. IF your injection contained a steroid (anti-inflammatory medication), it takes approximately 3 days to start feeling relief and 7-10 days to see your greatest results from the medication. It is possible you may need subsequent injections. This would be discussed at your follow up appointment with pain management or your referring doctor.      PLEASE CALL YOUR DOCTOR IF:  1. Redness or swelling around the injection site.  2. Fever of 101 degrees or more  3. Drainage  (pus) from the injection site.  4. For any continuous bleeding (some dried blood over the incision is normal.)    FOR EMERGENCIES:   If any unusual problems or difficulties occur during clinic hours, call [phone] or [phone].

## 2018-12-18 ENCOUNTER — OFFICE VISIT (OUTPATIENT)
Dept: PAIN MEDICINE | Facility: CLINIC | Age: 58
End: 2018-12-18
Attending: ANESTHESIOLOGY
Payer: COMMERCIAL

## 2018-12-18 VITALS
HEART RATE: 87 BPM | HEIGHT: 69 IN | SYSTOLIC BLOOD PRESSURE: 144 MMHG | BODY MASS INDEX: 21.87 KG/M2 | RESPIRATION RATE: 18 BRPM | TEMPERATURE: 98 F | DIASTOLIC BLOOD PRESSURE: 99 MMHG | WEIGHT: 147.69 LBS

## 2018-12-18 DIAGNOSIS — M47.816 LUMBAR SPONDYLOSIS: ICD-10-CM

## 2018-12-18 DIAGNOSIS — M54.16 LUMBAR RADICULOPATHY: Primary | ICD-10-CM

## 2018-12-18 PROCEDURE — 3008F BODY MASS INDEX DOCD: CPT | Mod: CPTII,S$GLB,, | Performed by: ANESTHESIOLOGY

## 2018-12-18 PROCEDURE — 99999 PR PBB SHADOW E&M-EST. PATIENT-LVL III: CPT | Mod: PBBFAC,,, | Performed by: ANESTHESIOLOGY

## 2018-12-18 PROCEDURE — 99214 OFFICE O/P EST MOD 30 MIN: CPT | Mod: S$GLB,,, | Performed by: ANESTHESIOLOGY

## 2018-12-18 RX ORDER — GABAPENTIN 300 MG/1
CAPSULE ORAL
Qty: 270 CAPSULE | Refills: 11 | Status: SHIPPED | OUTPATIENT
Start: 2018-12-18 | End: 2019-01-21 | Stop reason: DRUGHIGH

## 2018-12-18 NOTE — PROGRESS NOTES
Subjective:      Patient ID: Nacho Liu is a 58 y.o. male.    Chief Complaint: No chief complaint on file.    Referred by: No ref. provider found     Pain Scales  Best: 8/10  Worst: 8/10  Usually: 8/10  Today: 8/10    Hip Pain    Pertinent negatives include no fever or itching.     Mr. Liu presents for follow-up after right L4 and L5 TFESI on 12/3. He reports 100% relief for 2 days but pain has since returned. Denies any new or worsening symptoms. Pain is severe in his right lower back radiating into the right leg. Pain improved at night with gabapentin but he states it wears off during the morning. He has not tried a higher dose than 300-300-600. Denies bowel/bladder changes or saddle anesthesia.     Past Medical History:   Diagnosis Date    Heart murmur     Hypertension        Past Surgical History:   Procedure Laterality Date    INJECTION, STEROID, EPIDURAL, TRANSFORAMINAL APPROACH Right 12/3/2018    Performed by [facility]av [facility] at [facility] PAIN [facility]    Injection,steroid,epidural,transforaminal approach RIGHT L5 AND S1 TF PAVITHRA Right 10/29/2018    Performed by [facility]av [facility] at [facility] PA[facility]    Injection,steroid,epidural,transforaminal approach, RIGHT L4 and S1 TF PAVITHRA WITH BUPIVACAINE Right 8/30/2018    [facility]    KNEE SURGERY         Review of patient's allergies indicates:   Allergen Reactions    Lipitor [atorvastatin] Swelling       Current Outpatient Medications   Medication Sig Dispense Refill    gabapentin (NEURONTIN) 300 MG capsule Take 300 mg in AM, take 300 mg in the afternoon, take 600 mg at bedtime 120 capsule 2    hydroCHLOROthiazide (HYDRODIURIL) 25 MG tablet        No current facility-administered medications for this visit.      Facility-Administered Medications Ordered in Other Visits   Medication Dose Route Frequency Provider Last Rate Last Dose    0.9%  NaCl infusion  500 mL Intravenous Continuous Adryan More MD           Family History    Problem Relation Age of Onset    Arthritis Mother     Cirrhosis Father        Social History     Socioeconomic History    Marital status:      Spouse name: Not on file    Number of children: Not on file    Years of education: Not on file    Highest education level: Not on file   Social Needs    Financial resource strain: Not on file    Food insecurity - worry: Not on file    Food insecurity - inability: Not on file    Transportation needs - medical: Not on file    Transportation needs - non-medical: Not on file   Occupational History    Not on file   Tobacco Use    Smoking status: Never Smoker    Smokeless tobacco: Never Used   Substance and Sexual Activity    Alcohol use: Yes    Drug use: No    Sexual activity: Not on file   Other Topics Concern    Not on file   Social History Narrative    Not on file           Review of Systems   Constitution: Negative for chills, fever, malaise/fatigue, weight gain and weight loss.   HENT: Negative for ear pain and hoarse voice.    Eyes: Negative for blurred vision, pain and visual disturbance.   Cardiovascular: Negative for chest pain, dyspnea on exertion and irregular heartbeat.   Respiratory: Negative for cough, shortness of breath and wheezing.    Endocrine: Negative for cold intolerance and heat intolerance.   Hematologic/Lymphatic: Negative for adenopathy and bleeding problem. Does not bruise/bleed easily.   Skin: Negative for color change, itching and rash.   Musculoskeletal: Negative for back pain and neck pain.        Right hip pain    Gastrointestinal: Negative for change in bowel habit, diarrhea, hematemesis, hematochezia, melena and vomiting.   Genitourinary: Negative for flank pain, frequency, hematuria and urgency.   Neurological: Negative for difficulty with concentration, dizziness, headaches, loss of balance and seizures.   Psychiatric/Behavioral: Negative for altered mental status, depression and suicidal ideas. The patient is not  "nervous/anxious.    Allergic/Immunologic: Negative for HIV exposure.           Objective:   BP (!) 144/99   Pulse 87   Temp 98.1 °F (36.7 °C) (Oral)   Resp 18   Ht 5' 9" (1.753 m)   Wt 67 kg (147 lb 11.2 oz)   BMI 21.81 kg/m²   Pain Disability Index Review:  Last 3 PDI Scores 12/18/2018 11/12/2018 10/5/2018   Pain Disability Index (PDI) 58 40 62     Normocephalic.  Atraumatic.  Affect appropriate.  Breathing unlabored.  Extra ocular muscles intact.           Ortho/SPM Exam      GEN:  Well developed, well nourished.  No acute distress.   HEENT:  No trauma.  Mucous membranes moist.  Nares patent bilaterally.  PSYCH: Normal affect. Thought content appropriate.  CHEST:  Breathing symmetric.  No audible wheezing.  ABD: Soft, non-distended.  SKIN:  Warm, pink, dry.  No rash on exposed areas.    EXT:  No cyanosis, clubbing, or edema.  No color change or changes in nail or hair growth.  NEURO/MUSCULOSKELETAL:  Fully alert, oriented, and appropriate. Speech normal mireya. No cranial nerve deficits.   Gait: antalgic   Motor Strength: 5/5 motor strength throughout lower extremities.   Sensory: no sensory deficit in the lower extremities.   Reflexes:  1+ and symmetric throughout.  Downgoing Babinski's bilaterally.  No clonus or spasticity.  L-Spine:  full ROM with pain. neg facet loading bilaterally.  neg SLR bilaterally.    Non tender    Assessment:       Encounter Diagnoses   Name Primary?    Lumbar radiculopathy Yes    Lumbar spondylosis          Plan:   We discussed with the patient the assessment and recommendations. The following is the plan we agreed on:  1. Increase gabapentin to 600mg TID for 3 days, then increase to 900mg TID to follow if tolerated  2. RTC in 1 month for follow-up with MELODY      Diagnoses and all orders for this visit:    Lumbar radiculopathy    Lumbar spondylosis      Adryan More MD  PGY-4  I have personally taken the history and examined this patient and agree with the resident's note as " stated above.

## 2019-01-21 ENCOUNTER — OFFICE VISIT (OUTPATIENT)
Dept: PAIN MEDICINE | Facility: CLINIC | Age: 59
End: 2019-01-21
Payer: COMMERCIAL

## 2019-01-21 VITALS
SYSTOLIC BLOOD PRESSURE: 135 MMHG | HEART RATE: 84 BPM | BODY MASS INDEX: 23.32 KG/M2 | HEIGHT: 69 IN | TEMPERATURE: 98 F | WEIGHT: 157.44 LBS | DIASTOLIC BLOOD PRESSURE: 83 MMHG

## 2019-01-21 DIAGNOSIS — M54.16 LUMBAR RADICULOPATHY: Primary | ICD-10-CM

## 2019-01-21 DIAGNOSIS — M51.36 DDD (DEGENERATIVE DISC DISEASE), LUMBAR: ICD-10-CM

## 2019-01-21 DIAGNOSIS — M47.26 OSTEOARTHRITIS OF SPINE WITH RADICULOPATHY, LUMBAR REGION: ICD-10-CM

## 2019-01-21 DIAGNOSIS — M47.816 FACET ARTHRITIS OF LUMBAR REGION: ICD-10-CM

## 2019-01-21 PROCEDURE — 3008F BODY MASS INDEX DOCD: CPT | Mod: CPTII,S$GLB,, | Performed by: NURSE PRACTITIONER

## 2019-01-21 PROCEDURE — 3008F PR BODY MASS INDEX (BMI) DOCUMENTED: ICD-10-PCS | Mod: CPTII,S$GLB,, | Performed by: NURSE PRACTITIONER

## 2019-01-21 PROCEDURE — 99999 PR PBB SHADOW E&M-EST. PATIENT-LVL III: ICD-10-PCS | Mod: PBBFAC,,, | Performed by: NURSE PRACTITIONER

## 2019-01-21 PROCEDURE — 99999 PR PBB SHADOW E&M-EST. PATIENT-LVL III: CPT | Mod: PBBFAC,,, | Performed by: NURSE PRACTITIONER

## 2019-01-21 PROCEDURE — 99213 OFFICE O/P EST LOW 20 MIN: CPT | Mod: S$GLB,,, | Performed by: NURSE PRACTITIONER

## 2019-01-21 PROCEDURE — 99213 PR OFFICE/OUTPT VISIT, EST, LEVL III, 20-29 MIN: ICD-10-PCS | Mod: S$GLB,,, | Performed by: NURSE PRACTITIONER

## 2019-01-21 RX ORDER — AMLODIPINE BESYLATE 5 MG/1
TABLET ORAL
COMMUNITY
Start: 2019-01-07 | End: 2019-02-19

## 2019-01-21 RX ORDER — PREGABALIN 75 MG/1
75 CAPSULE ORAL DAILY
Qty: 30 CAPSULE | Refills: 1 | Status: SHIPPED | OUTPATIENT
Start: 2019-01-21 | End: 2019-02-19

## 2019-01-21 NOTE — PROGRESS NOTES
Chronic patient Established Note (Follow up visit)      SUBJECTIVE:    Nacho Liu presents to the clinic for a follow-up appointment for low back and leg pain. He reports no significant relief of his low back and leg pain with increasing Gabapentin. He continues to report low back pain that radiates down the back of his right to under his foot. He denies any left leg pain. His pain is worse with prolonged standing, especially at work. He denies any other health changes. He denies any bowel or bladder incontinence. His pain today is 7/10.       Pain Disability Index Review:  Last 3 PDI Scores 1/21/2019 12/18/2018 11/12/2018   Pain Disability Index (PDI) 47 58 40       Pain Medications:  Gabapentin 900 mg TID    Opioid Contract: no     report:  Reviewed and consistent with medication use as prescribed.    Pain Procedures:   2 previous ESIs at [facility] in 2018  8/30/2018- Right L4 and S1 TF PAVITHRA   10/29/2018- Right L5 and S1 TF PAVITHRA  12/3/2018- right L4 and L5 TF PAVITHRA    Physical Therapy/Home Exercise: no    Imaging:   MRI Lumbar Spine 5/31/2018:  FINDINGS:  There is straightening of the normal lumbar lordosis.  Heterogeneous T1 bone marrow signal throughout the lumbar spine without focal bone marrow edema.  Overall concerning for  red marrow reconversion clinical correlation for underlying chronic anemia, no evidence for focal infiltrative process.    There is scattered endplate degeneration most prominent at L4/L5 level with superimposed disc desiccation and height loss.    The distal spinal cord and conus is normal in signal and contour tip of the conus approximates the inferior L1 level.    No aneurysmal dilatation of the visualized abdominal aorta.    T12/L1 through L1/L2: No significant disc bulge, central canal or neural foraminal stenosis.    L2/L3: No significant disc bulge central canal or neural foraminal stenosis.    L3/L4:Small posterior disc osteophyte without significant central canal or neural  foraminal stenosis    L4/L5:Posterior disc osteophyte with ligamentum flavum hypertrophy and small annular fissure with mild central canal stenosis and mild neural foraminal narrowing.    L5/S1: Small posterior disc osteophyte with ligamentum flavum hypertrophy without significant central canal stenosis with attenuation of the thecal sac related to epidural lipomatosis.  There is facet joint arthropathy and mild moderate neural foraminal stenosis bilaterally.    This report was flagged in Epic as abnormal.      Impression       Multilevel degenerative change lumbar spine most pronounced at L4/L5 with posterior disc osteophyte with ligamentum flavum hypertrophy and small annular fissure there is mild central canal and neural foraminal stenosis.    There is heterogeneous T1 bone marrow signal throughout the lumbosacral spine most compatible with red marrow reconversion clinical correlation for possible underlying anemia.     EMG 7/5/2018:  Normal study    Allergies:   Review of patient's allergies indicates:   Allergen Reactions    Lipitor [atorvastatin] Swelling       Current Medications:   Current Outpatient Medications   Medication Sig Dispense Refill    gabapentin (NEURONTIN) 300 MG capsule Take 300 mg in AM, take 300 mg in the afternoon, take 600 mg at bedtime 120 capsule 2    gabapentin (NEURONTIN) 300 MG capsule 2 caps 3X a day for 3 days then 3caps 3 X a day to follow if well tolerated 270 capsule 11    hydroCHLOROthiazide (HYDRODIURIL) 25 MG tablet        No current facility-administered medications for this visit.      Facility-Administered Medications Ordered in Other Visits   Medication Dose Route Frequency Provider Last Rate Last Dose    0.9%  NaCl infusion  500 mL Intravenous Continuous Adryan More MD           REVIEW OF SYSTEMS:    GENERAL:  No weight loss, malaise or fevers.  HEENT:  Negative for frequent or significant headaches.  NECK:  Negative for lumps, goiter, pain and significant neck  swelling.  RESPIRATORY:  Negative for cough, wheezing or shortness of breath.  CARDIOVASCULAR:  Negative for chest pain, leg swelling or palpitations. HTN  GI:  Negative for abdominal discomfort, blood in stools or black stools or change in bowel habits.  MUSCULOSKELETAL:  See HPI.  SKIN:  Negative for lesions, rash, and itching.  PSYCH:  Negative for sleep disturbance, mood disorder and recent psychosocial stressors.  HEMATOLOGY/LYMPHOLOGY:  Negative for prolonged bleeding, bruising easily or swollen nodes.  NEURO:   No history of headaches, syncope, paralysis, seizures or tremors.  All other reviewed and negative other than HPI.    Past Medical History:  Past Medical History:   Diagnosis Date    Heart murmur     Hypertension        Past Surgical History:  Past Surgical History:   Procedure Laterality Date    INJECTION, STEROID, EPIDURAL, TRANSFORAMINAL APPROACH Right 12/3/2018    Performed by [facility] at [facility]    Injection,steroid,epidural,transforaminal approach RIGHT L5 AND S1 TF PAVITHRA Right 10/29/2018    Performed by [facility]av [facility] at [facility] PAIN [facility]    Injection,steroid,epidural,transforaminal approach, RIGHT L4 and S1 TF PAVITHRA WITH BUPIVACAINE Right 8/30/2018    Performed by [facility]ual [facility]av MD at [facility] PAIN [facility]    KNEE SURGERY         Family History:  Family History   Problem Relation Age of Onset    Arthritis Mother     Cirrhosis Father        Social History:  Social History     Socioeconomic History    Marital status:      Spouse name: Not on file    Number of children: Not on file    Years of education: Not on file    Highest education level: Not on file   Social Needs    Financial resource strain: Not on file    Food insecurity - worry: Not on file    Food insecurity - inability: Not on file    Transportation needs - medical: Not on file    Transportation needs - non-medical: Not on file   Occupational History    Not on file   Tobacco Use    Smoking status: Never Smoker     "Smokeless tobacco: Never Used   Substance and Sexual Activity    Alcohol use: Yes    Drug use: No    Sexual activity: Not on file   Other Topics Concern    Not on file   Social History Narrative    Not on file       OBJECTIVE:    /83   Pulse 84   Temp 98.2 °F (36.8 °C)   Ht 5' 9" (1.753 m)   Wt 71.4 kg (157 lb 6.5 oz)   BMI 23.25 kg/m²     PHYSICAL EXAMINATION:    General appearance: Well appearing, in no acute distress, alert and oriented x3.  Psych:  Mood and affect appropriate.  Skin: Skin color, texture, turgor normal, no rashes or lesions, in both upper and lower body.  Head/face:  Atraumatic, normocephalic. No palpable lymph nodes.  Cor: RRR  Pulm: CTA  GI: Abdomen soft and non-tender.  Back: Straight leg raising in the sitting position is negative for radicular pain bilaterally. There is mild pain to palpation over the lumbar spine. Limited ROM with pain on extension. Positive facet loading bilaterally, right greater than left.   Extremities: Peripheral joint ROM is full and pain free without obvious instability or laxity in all four extremities. No deformities, edema, or skin discoloration. Good capillary refill.  Musculoskeletal: Shoulder, hip, sacroiliac and knee provocative maneuvers are negative. Bilateral lower extremity strength is normal and symmetric.  No atrophy or tone abnormalities are noted.  Neuro: Bilateral lower extremity coordination and muscle stretch reflexes are physiologic and symmetric.  Plantar response are downgoing. Decreased sensation to right calf and foot.   Gait: Antalgic- ambulates without assistance.     ASSESSMENT: 58 y.o. year old male with low back and leg pain, consistent with the followin. Lumbar radiculopathy     2. Osteoarthritis of spine with radiculopathy, lumbar region     3. Facet arthritis of lumbar region     4. DDD (degenerative disc disease), lumbar           PLAN:     - Previous imaging was reviewed and discussed with the patient " today.    - We will be cautious with further steroid injections.    - Discontinue Gabapentin. Trial Lyrica 75 mg at bedtime. We may escalate this in the future. Medication management provided by Dr. Yadav.     - I have stressed the importance of physical activity and a home exercise plan to help with pain and improve health.    - RTC in 1 month.     - Counseled patient regarding the importance of activity modification and constant sleeping habits.    - Dr. Yadav was consulted on the patient and agrees with this plan.    The above plan and management options were discussed at length with patient. Patient is in agreement with the above and verbalized understanding.    Ella Hererra NP  01/21/2019

## 2019-02-19 ENCOUNTER — OFFICE VISIT (OUTPATIENT)
Dept: PAIN MEDICINE | Facility: CLINIC | Age: 59
End: 2019-02-19
Payer: COMMERCIAL

## 2019-02-19 VITALS
TEMPERATURE: 98 F | WEIGHT: 155 LBS | HEART RATE: 75 BPM | HEIGHT: 69 IN | DIASTOLIC BLOOD PRESSURE: 86 MMHG | SYSTOLIC BLOOD PRESSURE: 124 MMHG | BODY MASS INDEX: 22.96 KG/M2

## 2019-02-19 DIAGNOSIS — M51.36 DDD (DEGENERATIVE DISC DISEASE), LUMBAR: ICD-10-CM

## 2019-02-19 DIAGNOSIS — M54.16 LUMBAR RADICULOPATHY: Primary | ICD-10-CM

## 2019-02-19 DIAGNOSIS — M47.26 OSTEOARTHRITIS OF SPINE WITH RADICULOPATHY, LUMBAR REGION: ICD-10-CM

## 2019-02-19 DIAGNOSIS — M47.816 FACET ARTHRITIS OF LUMBAR REGION: ICD-10-CM

## 2019-02-19 PROCEDURE — 3008F BODY MASS INDEX DOCD: CPT | Mod: CPTII,S$GLB,, | Performed by: NURSE PRACTITIONER

## 2019-02-19 PROCEDURE — 99213 PR OFFICE/OUTPT VISIT, EST, LEVL III, 20-29 MIN: ICD-10-PCS | Mod: S$GLB,,, | Performed by: NURSE PRACTITIONER

## 2019-02-19 PROCEDURE — 99213 OFFICE O/P EST LOW 20 MIN: CPT | Mod: S$GLB,,, | Performed by: NURSE PRACTITIONER

## 2019-02-19 PROCEDURE — 99999 PR PBB SHADOW E&M-EST. PATIENT-LVL III: CPT | Mod: PBBFAC,,, | Performed by: NURSE PRACTITIONER

## 2019-02-19 PROCEDURE — 99999 PR PBB SHADOW E&M-EST. PATIENT-LVL III: ICD-10-PCS | Mod: PBBFAC,,, | Performed by: NURSE PRACTITIONER

## 2019-02-19 PROCEDURE — 3008F PR BODY MASS INDEX (BMI) DOCUMENTED: ICD-10-PCS | Mod: CPTII,S$GLB,, | Performed by: NURSE PRACTITIONER

## 2019-02-19 RX ORDER — GABAPENTIN 400 MG/1
CAPSULE ORAL
Qty: 120 CAPSULE | Refills: 4 | Status: SHIPPED | OUTPATIENT
Start: 2019-02-19 | End: 2019-06-10 | Stop reason: SDUPTHER

## 2019-02-19 RX ORDER — GABAPENTIN 400 MG/1
CAPSULE ORAL
COMMUNITY
Start: 2019-01-26 | End: 2019-02-19 | Stop reason: SDUPTHER

## 2019-02-19 NOTE — PROGRESS NOTES
Chronic patient Established Note (Follow up visit)      SUBJECTIVE:    Nacho Liu presents to the clinic for a follow-up appointment for low back and leg pain. He reports that Lyrica was too expensive. He has restarted Gabapentin with benefit. He is currently taking 400 mg in the morning and afternoon. He also takes 800 mg at bedtime. He reports that this has been very helpful. He continues to report low back pain that radiates down the back of his right leg to under his foot. This pain is worse with prolonged standing, especially after work. He denies any left leg pain. He denies any other health changes. He denies any bowel or bladder incontinence. His pain today is 5/10.      Pain Disability Index Review:  Last 3 PDI Scores 2/19/2019 1/21/2019 12/18/2018   Pain Disability Index (PDI) 34 47 58       Pain Medications:  Gabapentin 400 mg in AM, afternoon, and 800 mg at bedtime.     Opioid Contract: no     report:  Reviewed and consistent with medication use as prescribed.    Pain Procedures:   2 previous ESIs at [facility] in 2018  8/30/2018- Right L4 and S1 TF PAVITHRA   10/29/2018- Right L5 and S1 TF PAVITHRA  12/3/2018- Right L4 and L5 TF PAVITHRA    Physical Therapy/Home Exercise: no    Imaging:   MRI Lumbar Spine 5/31/2018:  FINDINGS:  There is straightening of the normal lumbar lordosis.  Heterogeneous T1 bone marrow signal throughout the lumbar spine without focal bone marrow edema.  Overall concerning for  red marrow reconversion clinical correlation for underlying chronic anemia, no evidence for focal infiltrative process.    There is scattered endplate degeneration most prominent at L4/L5 level with superimposed disc desiccation and height loss.    The distal spinal cord and conus is normal in signal and contour tip of the conus approximates the inferior L1 level.    No aneurysmal dilatation of the visualized abdominal aorta.    T12/L1 through L1/L2: No significant disc bulge, central canal or neural foraminal  stenosis.    L2/L3: No significant disc bulge central canal or neural foraminal stenosis.    L3/L4:Small posterior disc osteophyte without significant central canal or neural foraminal stenosis    L4/L5:Posterior disc osteophyte with ligamentum flavum hypertrophy and small annular fissure with mild central canal stenosis and mild neural foraminal narrowing.    L5/S1: Small posterior disc osteophyte with ligamentum flavum hypertrophy without significant central canal stenosis with attenuation of the thecal sac related to epidural lipomatosis.  There is facet joint arthropathy and mild moderate neural foraminal stenosis bilaterally.    This report was flagged in Epic as abnormal.      Impression       Multilevel degenerative change lumbar spine most pronounced at L4/L5 with posterior disc osteophyte with ligamentum flavum hypertrophy and small annular fissure there is mild central canal and neural foraminal stenosis.    There is heterogeneous T1 bone marrow signal throughout the lumbosacral spine most compatible with red marrow reconversion clinical correlation for possible underlying anemia.     EMG 7/5/2018:  Normal study    Allergies:   Review of patient's allergies indicates:   Allergen Reactions    Lipitor [atorvastatin] Swelling       Current Medications:   Current Outpatient Medications   Medication Sig Dispense Refill    gabapentin (NEURONTIN) 400 MG capsule       hydroCHLOROthiazide (HYDRODIURIL) 25 MG tablet       amLODIPine (NORVASC) 5 MG tablet       pregabalin (LYRICA) 75 MG capsule Take 1 capsule (75 mg total) by mouth once daily. 30 capsule 1     No current facility-administered medications for this visit.      Facility-Administered Medications Ordered in Other Visits   Medication Dose Route Frequency Provider Last Rate Last Dose    0.9%  NaCl infusion  500 mL Intravenous Continuous Adryan More MD           REVIEW OF SYSTEMS:    GENERAL:  No weight loss, malaise or fevers.  HEENT:  Negative for  frequent or significant headaches.  NECK:  Negative for lumps, goiter, pain and significant neck swelling.  RESPIRATORY:  Negative for cough, wheezing or shortness of breath.  CARDIOVASCULAR:  Negative for chest pain, leg swelling or palpitations. HTN  GI:  Negative for abdominal discomfort, blood in stools or black stools or change in bowel habits.  MUSCULOSKELETAL:  See HPI.  SKIN:  Negative for lesions, rash, and itching.  PSYCH:  Negative for sleep disturbance, mood disorder and recent psychosocial stressors.  HEMATOLOGY/LYMPHOLOGY:  Negative for prolonged bleeding, bruising easily or swollen nodes.  NEURO:   No history of headaches, syncope, paralysis, seizures or tremors.  All other reviewed and negative other than HPI.    Past Medical History:  Past Medical History:   Diagnosis Date    Heart murmur     Hypertension        Past Surgical History:  Past Surgical History:   Procedure Laterality Date    INJECTION, STEROID, EPIDURAL, TRANSFORAMINAL APPROACH Right 12/3/2018    Performed by [facility] at [facility] [place] [facility]    Injection,steroid,epidural,transforaminal approach RIGHT L5 AND S1 TF PAVITHRA Right 10/29/2018    Performed by Pascual Yadav MD at [facility] [place] PAIN [facility]GT    Injection,steroid,epidural,transforaminal approach, RIGHT L4 and S1 TF PAVITHRA WITH BUPIVACAINE Right 8/30/2018    Performed by Pascual Yadav MD at [facility] [place] PAIN [facility]GT    KNEE SURGERY         Family History:  Family History   Problem Relation Age of Onset    Arthritis Mother     Cirrhosis Father        Social History:  Social History     Socioeconomic History    Marital status:      Spouse name: None    Number of children: None    Years of education: None    Highest education level: None   Social Needs    Financial resource strain: None    Food insecurity - worry: None    Food insecurity - inability: None    Transportation needs - medical: None    Transportation needs - non-medical: None   Occupational History    None   Tobacco Use     "Smoking status: Never Smoker    Smokeless tobacco: Never Used   Substance and Sexual Activity    Alcohol use: Yes    Drug use: No    Sexual activity: None   Other Topics Concern    None   Social History Narrative    None       OBJECTIVE:    /86   Pulse 75   Temp 98.4 °F (36.9 °C)   Ht 5' 9" (1.753 m)   Wt 70.3 kg (154 lb 15.7 oz)   BMI 22.89 kg/m²     PHYSICAL EXAMINATION:    General appearance: Well appearing, in no acute distress, alert and oriented x3.  Psych:  Mood and affect appropriate.  Skin: Skin color, texture, turgor normal, no rashes or lesions, in both upper and lower body.  Head/face:  Atraumatic, normocephalic. No palpable lymph nodes.  Cor: RRR  Pulm: CTA  GI: Abdomen soft and non-tender.  Back: Straight leg raising in the sitting position is negative for radicular pain bilaterally. There is mild pain to palpation over the lumbar spine. Limited ROM with pain on extension. Positive facet loading bilaterally, right greater than left.   Extremities: Peripheral joint ROM is full and pain free without obvious instability or laxity in all four extremities. No deformities, edema, or skin discoloration. Good capillary refill.  Musculoskeletal: Shoulder, hip, sacroiliac and knee provocative maneuvers are negative. Bilateral lower extremity strength is normal and symmetric.  No atrophy or tone abnormalities are noted.  Neuro: Bilateral lower extremity coordination and muscle stretch reflexes are physiologic and symmetric.  Plantar response are downgoing. Decreased sensation to right calf and foot.   Gait: Antalgic- ambulates without assistance.     ASSESSMENT: 58 y.o. year old male with low back and leg pain, consistent with the followin. Lumbar radiculopathy  gabapentin (NEURONTIN) 400 MG capsule   2. Osteoarthritis of spine with radiculopathy, lumbar region  gabapentin (NEURONTIN) 400 MG capsule   3. Facet arthritis of lumbar region  gabapentin (NEURONTIN) 400 MG capsule   4. DDD " (degenerative disc disease), lumbar  gabapentin (NEURONTIN) 400 MG capsule         PLAN:     - Previous imaging was reviewed and discussed with the patient today.    - We will be cautious with further steroid injections.    - Continue Gabapentin 400 mg in the AM, 400 mg in the afternoon, and 800 mg at bedtime. Refill provided today. We may escalate this further as needed.     - I have stressed the importance of physical activity and a home exercise plan to help with pain and improve health.    - RTC in 3 months or sooner if needed.     - Counseled patient regarding the importance of activity modification and constant sleeping habits.    - Dr. Yadav was consulted on the patient and agrees with this plan.    The above plan and management options were discussed at length with patient. Patient is in agreement with the above and verbalized understanding.    Ella Herrera NP  02/19/2019

## 2019-05-14 ENCOUNTER — OFFICE VISIT (OUTPATIENT)
Dept: PAIN MEDICINE | Facility: CLINIC | Age: 59
End: 2019-05-14
Payer: COMMERCIAL

## 2019-05-14 ENCOUNTER — TELEPHONE (OUTPATIENT)
Dept: PAIN MEDICINE | Facility: CLINIC | Age: 59
End: 2019-05-14

## 2019-05-14 VITALS
WEIGHT: 146.19 LBS | HEART RATE: 66 BPM | TEMPERATURE: 98 F | BODY MASS INDEX: 21.65 KG/M2 | SYSTOLIC BLOOD PRESSURE: 158 MMHG | DIASTOLIC BLOOD PRESSURE: 84 MMHG | HEIGHT: 69 IN

## 2019-05-14 DIAGNOSIS — M51.36 DDD (DEGENERATIVE DISC DISEASE), LUMBAR: ICD-10-CM

## 2019-05-14 DIAGNOSIS — M54.16 LUMBAR RADICULOPATHY: Primary | ICD-10-CM

## 2019-05-14 DIAGNOSIS — M47.26 OSTEOARTHRITIS OF SPINE WITH RADICULOPATHY, LUMBAR REGION: ICD-10-CM

## 2019-05-14 DIAGNOSIS — M47.816 FACET ARTHRITIS OF LUMBAR REGION: ICD-10-CM

## 2019-05-14 PROCEDURE — 99999 PR PBB SHADOW E&M-EST. PATIENT-LVL III: CPT | Mod: PBBFAC,,, | Performed by: NURSE PRACTITIONER

## 2019-05-14 PROCEDURE — 3008F PR BODY MASS INDEX (BMI) DOCUMENTED: ICD-10-PCS | Mod: CPTII,S$GLB,, | Performed by: NURSE PRACTITIONER

## 2019-05-14 PROCEDURE — 99213 OFFICE O/P EST LOW 20 MIN: CPT | Mod: S$GLB,,, | Performed by: NURSE PRACTITIONER

## 2019-05-14 PROCEDURE — 99999 PR PBB SHADOW E&M-EST. PATIENT-LVL III: ICD-10-PCS | Mod: PBBFAC,,, | Performed by: NURSE PRACTITIONER

## 2019-05-14 PROCEDURE — 3008F BODY MASS INDEX DOCD: CPT | Mod: CPTII,S$GLB,, | Performed by: NURSE PRACTITIONER

## 2019-05-14 PROCEDURE — 99213 PR OFFICE/OUTPT VISIT, EST, LEVL III, 20-29 MIN: ICD-10-PCS | Mod: S$GLB,,, | Performed by: NURSE PRACTITIONER

## 2019-05-14 NOTE — PROGRESS NOTES
Chronic patient Established Note (Follow up visit)      SUBJECTIVE:    Nacho Liu presents to the clinic for a follow-up appointment for low back and leg pain. He reports that his pain has worsened in the last month. He reports low back pain that radiates down the back of his right leg to under his foot. He describes this pain as burning and shooting in nature.His pain is worse with prolonged sitting, standing, and driving. He does report difficulty sleeping due to pain. He denies any left leg pain. He continues to take Gabapentin with some benefit. He denies any other health changes. He denies any bowel or bladder incontinence. His pain today is 10/10.      Pain Disability Index Review:  Last 3 PDI Scores 5/14/2019 2/19/2019 1/21/2019   Pain Disability Index (PDI) 63 [phone] 47       Pain Medications:  Gabapentin 400 mg in AM, afternoon, and 800 mg at bedtime.     Opioid Contract: no     report:  Reviewed and consistent with medication use as prescribed.    Pain Procedures:   2 previous ESIs at [facility] in 2018  8/30/2018- Right L4 and S1 TF PAVITHRA   10/29/2018- Right L5 and S1 TF PAVITHRA  12/3/2018- Right L4 and L5 TF PAVITHRA    Physical Therapy/Home Exercise: no    Imaging:   MRI Lumbar Spine 5/31/2018:  FINDINGS:  There is straightening of the normal lumbar lordosis.  Heterogeneous T1 bone marrow signal throughout the lumbar spine without focal bone marrow edema.  Overall concerning for  red marrow reconversion clinical correlation for underlying chronic anemia, no evidence for focal infiltrative process.    There is scattered endplate degeneration most prominent at L4/L5 level with superimposed disc desiccation and height loss.    The distal spinal cord and conus is normal in signal and contour tip of the conus approximates the inferior L1 level.    No aneurysmal dilatation of the visualized abdominal aorta.    T12/L1 through L1/L2: No significant disc bulge, central canal or neural foraminal stenosis.    L2/L3: No  significant disc bulge central canal or neural foraminal stenosis.    L3/L4:Small posterior disc osteophyte without significant central canal or neural foraminal stenosis    L4/L5:Posterior disc osteophyte with ligamentum flavum hypertrophy and small annular fissure with mild central canal stenosis and mild neural foraminal narrowing.    L5/S1: Small posterior disc osteophyte with ligamentum flavum hypertrophy without significant central canal stenosis with attenuation of the thecal sac related to epidural lipomatosis.  There is facet joint arthropathy and mild moderate neural foraminal stenosis bilaterally.    This report was flagged in Epic as abnormal.      Impression       Multilevel degenerative change lumbar spine most pronounced at L4/L5 with posterior disc osteophyte with ligamentum flavum hypertrophy and small annular fissure there is mild central canal and neural foraminal stenosis.    There is heterogeneous T1 bone marrow signal throughout the lumbosacral spine most compatible with red marrow reconversion clinical correlation for possible underlying anemia.     EMG 7/5/2018:  Normal study    Allergies:   Review of patient's allergies indicates:   Allergen Reactions    Lipitor [atorvastatin] Swelling       Current Medications:   Current Outpatient Medications   Medication Sig Dispense Refill    gabapentin (NEURONTIN) 400 MG capsule Take 1 capsule in the morning, 1 capsule in the afternoon, and 2 capsules at bedtime. 120 capsule 4    hydroCHLOROthiazide (HYDRODIURIL) 25 MG tablet        No current facility-administered medications for this visit.      Facility-Administered Medications Ordered in Other Visits   Medication Dose Route Frequency Provider Last Rate Last Dose    0.9%  NaCl infusion  500 mL Intravenous Continuous Adryan More MD           REVIEW OF SYSTEMS:    GENERAL:  No weight loss, malaise or fevers.  HEENT:  Negative for frequent or significant headaches.  NECK:  Negative for lumps,  goiter, pain and significant neck swelling.  RESPIRATORY:  Negative for cough, wheezing or shortness of breath.  CARDIOVASCULAR:  Negative for chest pain, leg swelling or palpitations. HTN  GI:  Negative for abdominal discomfort, blood in stools or black stools or change in bowel habits.  MUSCULOSKELETAL:  See HPI.  SKIN:  Negative for lesions, rash, and itching.  PSYCH:  Negative for sleep disturbance, mood disorder and recent psychosocial stressors.  HEMATOLOGY/LYMPHOLOGY:  Negative for prolonged bleeding, bruising easily or swollen nodes.  NEURO:   No history of headaches, syncope, paralysis, seizures or tremors.  All other reviewed and negative other than HPI.    Past Medical History:  Past Medical History:   Diagnosis Date    Heart murmur     Hypertension        Past Surgical History:  Past Surgical History:   Procedure Laterality Date    INJECTION, STEROID, EPIDURAL, TRANSFORAMINAL APPROACH Right 12/3/2018    Performed by [facility]av [facility] at [facility] PAIN MGT    Injection,steroid,epidural,transforaminal approach RIGHT L5 AND S1 TF PAVITHRA Right 10/29/2018    Performed by [facility]ual [facility]av [facility] at [facility] PAIN [facility]GT    Injection,steroid,epidural,transforaminal approach, RIGHT L4 and S1 TF PAVITHRA WITH BUPIVACAINE Right 8/30/2018    [facility] at [facility] MGT    KNEE SURGERY         Family History:  Family History   Problem Relation Age of Onset    Arthritis Mother     Cirrhosis Father        Social History:  Social History     Socioeconomic History    Marital status:      Spouse name: Not on file    Number of children: Not on file    Years of education: Not on file    Highest education level: Not on file   Occupational History    Not on file   Social Needs    Financial resource strain: Not on file    Food insecurity:     Worry: Not on file     Inability: Not on file    Transportation needs:     Medical: Not on file     Non-medical: Not on file   Tobacco Use    Smoking status: Never Smoker     "Smokeless tobacco: Never Used   Substance and Sexual Activity    Alcohol use: Yes    Drug use: No    Sexual activity: Not on file   Lifestyle    Physical activity:     Days per week: Not on file     Minutes per session: Not on file    Stress: Not on file   Relationships    Social connections:     Talks on phone: Not on file     Gets together: Not on file     Attends Jain service: Not on file     Active member of club or organization: Not on file     Attends meetings of clubs or organizations: Not on file     Relationship status: Not on file   Other Topics Concern    Not on file   Social History Narrative    Not on file       OBJECTIVE:    BP (!) 158/84   Pulse 66   Temp 97.7 °F (36.5 °C)   Ht 5' 9" (1.753 m)   Wt 66.3 kg (146 lb 2.6 oz)   BMI 21.58 kg/m²     PHYSICAL EXAMINATION:    General appearance: Well appearing, in no acute distress, alert and oriented x3.  Psych:  Mood and affect appropriate.  Skin: Skin color, texture, turgor normal, no rashes or lesions, in both upper and lower body.  Head/face:  Atraumatic, normocephalic. No palpable lymph nodes.  Cor: RRR  Pulm: CTA  GI: Abdomen soft and non-tender.  Back: Straight leg raising in the sitting position is positive for radicular pain on the right, negative on the left. There is mild pain to palpation over the lumbar spine. Limited ROM with pain on extension. Positive facet loading on the right.   Extremities: Peripheral joint ROM is full and pain free without obvious instability or laxity in all four extremities. No deformities, edema, or skin discoloration. Good capillary refill.  Musculoskeletal: Shoulder, hip, sacroiliac and knee provocative maneuvers are negative. Bilateral lower extremity strength is normal and symmetric.  No atrophy or tone abnormalities are noted.  Neuro: Bilateral lower extremity coordination and muscle stretch reflexes are physiologic and symmetric.  Plantar response are downgoing. Decreased sensation to right calf " and foot.   Gait: Antalgic- ambulates without assistance.     ASSESSMENT: 58 y.o. year old male with low back and leg pain, consistent with the followin. Lumbar radiculopathy     2. Osteoarthritis of spine with radiculopathy, lumbar region     3. Facet arthritis of lumbar region     4. DDD (degenerative disc disease), lumbar           PLAN:     - Previous imaging was reviewed and discussed with the patient today.    - Schedule for right L4 and L5 TF PAVITHRA. This has provided the greatest relief in the past.   The procedure, risks, benefits and options were discussed with patient. There are no contraindications to the procedure. The patient expressed understanding and agreed to proceed.      - Continue Gabapentin 400 mg in the AM, 400 mg in the afternoon, and 800 mg at bedtime. We may escalate this further as needed.     - I have stressed the importance of physical activity and a home exercise plan to help with pain and improve health.    - RTC 2 weeks after above procedure.     - Counseled patient regarding the importance of activity modification and constant sleeping habits.    - Dr. Yadav was consulted on the patient and agrees with this plan.    The above plan and management options were discussed at length with patient. Patient is in agreement with the above and verbalized understanding.    Ella Herrera, ALEJANDRINA  2019

## 2019-05-14 NOTE — TELEPHONE ENCOUNTER
Staff contacted patient in regards to rescheduling his/her original appointment with provider Dr. Yadav for 06.12.19 due to provider being out of office.    Patient was reschedule to 06.04.19 for 11:45a with provider.    Pt verbalized understanding and has confirmed new appt date & time.

## 2019-06-10 ENCOUNTER — OFFICE VISIT (OUTPATIENT)
Dept: PAIN MEDICINE | Facility: CLINIC | Age: 59
End: 2019-06-10
Payer: COMMERCIAL

## 2019-06-10 VITALS
HEART RATE: 98 BPM | SYSTOLIC BLOOD PRESSURE: 131 MMHG | DIASTOLIC BLOOD PRESSURE: 76 MMHG | HEIGHT: 69 IN | TEMPERATURE: 98 F | BODY MASS INDEX: 20.93 KG/M2 | WEIGHT: 141.31 LBS

## 2019-06-10 DIAGNOSIS — M47.26 OSTEOARTHRITIS OF SPINE WITH RADICULOPATHY, LUMBAR REGION: ICD-10-CM

## 2019-06-10 DIAGNOSIS — M51.36 DDD (DEGENERATIVE DISC DISEASE), LUMBAR: ICD-10-CM

## 2019-06-10 DIAGNOSIS — M54.16 LUMBAR RADICULOPATHY: ICD-10-CM

## 2019-06-10 DIAGNOSIS — M47.816 FACET ARTHRITIS OF LUMBAR REGION: ICD-10-CM

## 2019-06-10 PROCEDURE — 99999 PR PBB SHADOW E&M-EST. PATIENT-LVL III: ICD-10-PCS | Mod: PBBFAC,,, | Performed by: NURSE PRACTITIONER

## 2019-06-10 PROCEDURE — 3008F BODY MASS INDEX DOCD: CPT | Mod: CPTII,S$GLB,, | Performed by: NURSE PRACTITIONER

## 2019-06-10 PROCEDURE — 99213 PR OFFICE/OUTPT VISIT, EST, LEVL III, 20-29 MIN: ICD-10-PCS | Mod: S$GLB,,, | Performed by: NURSE PRACTITIONER

## 2019-06-10 PROCEDURE — 3008F PR BODY MASS INDEX (BMI) DOCUMENTED: ICD-10-PCS | Mod: CPTII,S$GLB,, | Performed by: NURSE PRACTITIONER

## 2019-06-10 PROCEDURE — 99213 OFFICE O/P EST LOW 20 MIN: CPT | Mod: S$GLB,,, | Performed by: NURSE PRACTITIONER

## 2019-06-10 PROCEDURE — 99999 PR PBB SHADOW E&M-EST. PATIENT-LVL III: CPT | Mod: PBBFAC,,, | Performed by: NURSE PRACTITIONER

## 2019-06-10 RX ORDER — GABAPENTIN 400 MG/1
800 CAPSULE ORAL 3 TIMES DAILY
Qty: 180 CAPSULE | Refills: 4 | Status: SHIPPED | OUTPATIENT
Start: 2019-06-10 | End: 2020-04-06 | Stop reason: SDUPTHER

## 2019-06-10 NOTE — PROGRESS NOTES
Chronic patient Established Note (Follow up visit)      SUBJECTIVE:    Nacho Liu presents to the clinic for a follow-up appointment for low back and leg pain. He was scheduled for repeat PAVITHRA but canceled. He reports increased low back pain that radiates down the back of his right leg to under his foot. He reports intermittent left sided pain in the same distribution. He feels as though this pain is worsening. His pain is worse with standing, walking, and while driving. He often feels as though his right leg will give out on him. He reports difficulty sleeping due to pain. He is currently taking Gabapentin with limited benefit. He denies any other health changes. He denies any bowel or bladder incontinence. His pain today is 9/10.      Pain Disability Index Review:  Last 3 PDI Scores 6/10/2019 5/14/2019 2/19/2019   Pain Disability Index (PDI) 64 63 34       Pain Medications:  Gabapentin 400 mg in AM, afternoon, and 800 mg at bedtime.     Opioid Contract: no     report:  Reviewed and consistent with medication use as prescribed.    Pain Procedures:   2 previous ESIs at [facility] in 2018  8/30/2018- Right L4 and S1 TF PAVITHRA   10/29/2018- Right L5 and S1 TF PAVITHRA  12/3/2018- Right L4 and L5 TF PAVITHRA    Physical Therapy/Home Exercise: no    Imaging:   MRI Lumbar Spine 5/31/2018:  FINDINGS:  There is straightening of the normal lumbar lordosis.  Heterogeneous T1 bone marrow signal throughout the lumbar spine without focal bone marrow edema.  Overall concerning for  red marrow reconversion clinical correlation for underlying chronic anemia, no evidence for focal infiltrative process.    There is scattered endplate degeneration most prominent at L4/L5 level with superimposed disc desiccation and height loss.    The distal spinal cord and conus is normal in signal and contour tip of the conus approximates the inferior L1 level.    No aneurysmal dilatation of the visualized abdominal aorta.    T12/L1 through L1/L2: No  significant disc bulge, central canal or neural foraminal stenosis.    L2/L3: No significant disc bulge central canal or neural foraminal stenosis.    L3/L4:Small posterior disc osteophyte without significant central canal or neural foraminal stenosis    L4/L5:Posterior disc osteophyte with ligamentum flavum hypertrophy and small annular fissure with mild central canal stenosis and mild neural foraminal narrowing.    L5/S1: Small posterior disc osteophyte with ligamentum flavum hypertrophy without significant central canal stenosis with attenuation of the thecal sac related to epidural lipomatosis.  There is facet joint arthropathy and mild moderate neural foraminal stenosis bilaterally.    This report was flagged in Epic as abnormal.      Impression       Multilevel degenerative change lumbar spine most pronounced at L4/L5 with posterior disc osteophyte with ligamentum flavum hypertrophy and small annular fissure there is mild central canal and neural foraminal stenosis.    There is heterogeneous T1 bone marrow signal throughout the lumbosacral spine most compatible with red marrow reconversion clinical correlation for possible underlying anemia.     EMG 7/5/2018:  Normal study    Allergies:   Review of patient's allergies indicates:   Allergen Reactions    Lipitor [atorvastatin] Swelling       Current Medications:   Current Outpatient Medications   Medication Sig Dispense Refill    gabapentin (NEURONTIN) 400 MG capsule Take 1 capsule in the morning, 1 capsule in the afternoon, and 2 capsules at bedtime. 120 capsule 4    hydroCHLOROthiazide (HYDRODIURIL) 25 MG tablet        No current facility-administered medications for this visit.      Facility-Administered Medications Ordered in Other Visits   Medication Dose Route Frequency Provider Last Rate Last Dose    0.9%  NaCl infusion  500 mL Intravenous Continuous Adryan More MD           REVIEW OF SYSTEMS:    GENERAL:  No weight loss, malaise or fevers.  HEENT:   Negative for frequent or significant headaches.  NECK:  Negative for lumps, goiter, pain and significant neck swelling.  RESPIRATORY:  Negative for cough, wheezing or shortness of breath.  CARDIOVASCULAR:  Negative for chest pain, leg swelling or palpitations. HTN  GI:  Negative for abdominal discomfort, blood in stools or black stools or change in bowel habits.  MUSCULOSKELETAL:  See HPI.  SKIN:  Negative for lesions, rash, and itching.  PSYCH:  Negative for sleep disturbance, mood disorder and recent psychosocial stressors.  HEMATOLOGY/LYMPHOLOGY:  Negative for prolonged bleeding, bruising easily or swollen nodes.  NEURO:   No history of headaches, syncope, paralysis, seizures or tremors.  All other reviewed and negative other than HPI.    Past Medical History:  Past Medical History:   Diagnosis Date    Heart murmur     Hypertension        Past Surgical History:  Past Surgical History:   Procedure Laterality Date    INJECTION, STEROID, EPIDURAL, TRANSFORAMINAL APPROACH Right 12/3/2018    [facility] PAIN [facility]    Injection,steroid,epidural,transforaminal approach RIGHT L5 AND S1 TF PAVIHTRA Right 10/29/2018    Performed by [facility] MD at [facility] PAIN MGT    Injection,steroid,epidural,transforaminal approach, RIGHT L4 and S1 TF PAVITHRA WITH BUPIVACAINE Right 8/30/2018    Performed by [facility]ual [facility]av MD at [facility] PAIN MGT    KNEE SURGERY         Family History:  Family History   Problem Relation Age of Onset    Arthritis Mother     Cirrhosis Father        Social History:  Social History     Socioeconomic History    Marital status:      Spouse name: Not on file    Number of children: Not on file    Years of education: Not on file    Highest education level: Not on file   Occupational History    Not on file   Social Needs    Financial resource strain: Not on file    Food insecurity:     Worry: Not on file     Inability: Not on file    Transportation needs:     Medical: Not on file      "Non-medical: Not on file   Tobacco Use    Smoking status: Never Smoker    Smokeless tobacco: Never Used   Substance and Sexual Activity    Alcohol use: Yes    Drug use: No    Sexual activity: Not on file   Lifestyle    Physical activity:     Days per week: Not on file     Minutes per session: Not on file    Stress: Not on file   Relationships    Social connections:     Talks on phone: Not on file     Gets together: Not on file     Attends Latter day service: Not on file     Active member of club or organization: Not on file     Attends meetings of clubs or organizations: Not on file     Relationship status: Not on file   Other Topics Concern    Not on file   Social History Narrative    Not on file       OBJECTIVE:    /76   Pulse 98   Temp 98.4 °F (36.9 °C)   Ht 5' 9" (1.753 m)   Wt 64.1 kg (141 lb 5 oz)   BMI 20.87 kg/m²     PHYSICAL EXAMINATION:    General appearance: Well appearing, in no acute distress, alert and oriented x3.  Psych:  Mood and affect appropriate.  Skin: Skin color, texture, turgor normal, no rashes or lesions, in both upper and lower body.  Head/face:  Atraumatic, normocephalic. No palpable lymph nodes.  Cor: RRR  Pulm: CTA  GI: Abdomen soft and non-tender.  Back: Straight leg raising in the sitting position is positive for radicular pain on the right, negative on the left. There is pain to palpation over the lumbar spine. Limited ROM with pain on flexion and extension. Positive facet loading on the right.   Extremities: Peripheral joint ROM is full and pain free without obvious instability or laxity in all four extremities. No deformities, edema, or skin discoloration. Good capillary refill.  Musculoskeletal: Shoulder, hip, sacroiliac and knee provocative maneuvers are negative. Bilateral lower extremity strength is normal and symmetric.  No atrophy or tone abnormalities are noted.  Neuro: Bilateral lower extremity coordination and muscle stretch reflexes are physiologic and " symmetric.  Plantar response are downgoing. Decreased sensation to right calf and foot.   Gait: Antalgic- ambulates without assistance.     ASSESSMENT: 58 y.o. year old male with low back and leg pain, consistent with the followin. Lumbar radiculopathy  gabapentin (NEURONTIN) 400 MG capsule    MRI Lumbar Spine Without Contrast   2. Osteoarthritis of spine with radiculopathy, lumbar region  gabapentin (NEURONTIN) 400 MG capsule    MRI Lumbar Spine Without Contrast   3. Facet arthritis of lumbar region  gabapentin (NEURONTIN) 400 MG capsule    MRI Lumbar Spine Without Contrast   4. DDD (degenerative disc disease), lumbar  gabapentin (NEURONTIN) 400 MG capsule    MRI Lumbar Spine Without Contrast         PLAN:     - Previous imaging was reviewed and discussed with the patient today.    - Obtain updated lumbar MRI.     - Increase Gabapentin to 800 mg TID.     - I have stressed the importance of physical activity and a home exercise plan to help with pain and improve health.    - RTC after imaging.     - Counseled patient regarding the importance of activity modification and constant sleeping habits.    The above plan and management options were discussed at length with patient. Patient is in agreement with the above and verbalized understanding.    Ella Herrera NP  06/10/2019

## 2019-06-14 ENCOUNTER — TELEPHONE (OUTPATIENT)
Dept: PAIN MEDICINE | Facility: CLINIC | Age: 59
End: 2019-06-14

## 2019-06-14 NOTE — TELEPHONE ENCOUNTER
My name is Staff, I am contacting you from [facility] regarding your appointment scheduled for 06.18.19, with Provider, just confirming you will be able to make it.    If you feel you need to reschedule or canceled please give our office a call so we can better assist you.      Staff requesting patient to arrive 15 mins ahead of schedule appointment time.    **Staff was unable to leave a message for patient, due to mailbox being full.**

## 2019-07-01 ENCOUNTER — OFFICE VISIT (OUTPATIENT)
Dept: PAIN MEDICINE | Facility: CLINIC | Age: 59
End: 2019-07-01
Payer: COMMERCIAL

## 2019-07-01 VITALS
DIASTOLIC BLOOD PRESSURE: 109 MMHG | TEMPERATURE: 99 F | BODY MASS INDEX: 20.63 KG/M2 | HEART RATE: 87 BPM | WEIGHT: 139.31 LBS | SYSTOLIC BLOOD PRESSURE: 156 MMHG | HEIGHT: 69 IN

## 2019-07-01 DIAGNOSIS — M47.816 FACET ARTHRITIS OF LUMBAR REGION: ICD-10-CM

## 2019-07-01 DIAGNOSIS — M51.36 DDD (DEGENERATIVE DISC DISEASE), LUMBAR: ICD-10-CM

## 2019-07-01 DIAGNOSIS — G89.4 CHRONIC PAIN DISORDER: ICD-10-CM

## 2019-07-01 DIAGNOSIS — M47.26 OSTEOARTHRITIS OF SPINE WITH RADICULOPATHY, LUMBAR REGION: ICD-10-CM

## 2019-07-01 DIAGNOSIS — M54.16 LUMBAR RADICULOPATHY: Primary | ICD-10-CM

## 2019-07-01 PROCEDURE — 99213 OFFICE O/P EST LOW 20 MIN: CPT | Mod: S$GLB,,, | Performed by: NURSE PRACTITIONER

## 2019-07-01 PROCEDURE — 99213 PR OFFICE/OUTPT VISIT, EST, LEVL III, 20-29 MIN: ICD-10-PCS | Mod: S$GLB,,, | Performed by: NURSE PRACTITIONER

## 2019-07-01 PROCEDURE — 99999 PR PBB SHADOW E&M-EST. PATIENT-LVL III: ICD-10-PCS | Mod: PBBFAC,,, | Performed by: NURSE PRACTITIONER

## 2019-07-01 PROCEDURE — 3008F BODY MASS INDEX DOCD: CPT | Mod: CPTII,S$GLB,, | Performed by: NURSE PRACTITIONER

## 2019-07-01 PROCEDURE — 99999 PR PBB SHADOW E&M-EST. PATIENT-LVL III: CPT | Mod: PBBFAC,,, | Performed by: NURSE PRACTITIONER

## 2019-07-01 PROCEDURE — 3008F PR BODY MASS INDEX (BMI) DOCUMENTED: ICD-10-PCS | Mod: CPTII,S$GLB,, | Performed by: NURSE PRACTITIONER

## 2019-07-01 NOTE — PROGRESS NOTES
Chronic patient Established Note (Follow up visit)      SUBJECTIVE:    Nacho Liu presents to the clinic for a follow-up appointment for low back and leg pain. He was unable to obtain lumbar MRI due to out of pocket cost. He continues to report low back pain that radiates down the side and back of his right leg to his foot. He denies any left leg pain. His leg pain is greater than his back pain. His pain is worse with standing, walking, and while driving. He often feels as though his right leg will give out on him. He reports difficulty sleeping due to pain. He is now out of work as he cannot stand for prolonged periods of time. He is frustrated with his continued pain. He is currently taking Gabapentin with some benefit. He denies any other health changes. He denies any bowel or bladder incontinence. His pain today is 8/10.      Pain Disability Index Review:  Last 3 PDI Scores 7/1/2019 6/10/2019 5/14/2019   Pain Disability Index (PDI) 58 [phone] 63       Pain Medications:  Gabapentin 800 mg TID    Opioid Contract: no     report:  Reviewed and consistent with medication use as prescribed.    Pain Procedures:   2 previous ESIs at [facility] in 2018  8/30/2018- Right L4 and S1 TF PAVITHRA   10/29/2018- Right L5 and S1 TF PAVITHRA  12/3/2018- Right L4 and L5 TF PAVITHRA    Physical Therapy/Home Exercise: no    Imaging:   MRI Lumbar Spine 5/31/2018:  FINDINGS:  There is straightening of the normal lumbar lordosis.  Heterogeneous T1 bone marrow signal throughout the lumbar spine without focal bone marrow edema.  Overall concerning for  red marrow reconversion clinical correlation for underlying chronic anemia, no evidence for focal infiltrative process.    There is scattered endplate degeneration most prominent at L4/L5 level with superimposed disc desiccation and height loss.    The distal spinal cord and conus is normal in signal and contour tip of the conus approximates the inferior L1 level.    No aneurysmal dilatation of the  visualized abdominal aorta.    T12/L1 through L1/L2: No significant disc bulge, central canal or neural foraminal stenosis.    L2/L3: No significant disc bulge central canal or neural foraminal stenosis.    L3/L4:Small posterior disc osteophyte without significant central canal or neural foraminal stenosis    L4/L5:Posterior disc osteophyte with ligamentum flavum hypertrophy and small annular fissure with mild central canal stenosis and mild neural foraminal narrowing.    L5/S1: Small posterior disc osteophyte with ligamentum flavum hypertrophy without significant central canal stenosis with attenuation of the thecal sac related to epidural lipomatosis.  There is facet joint arthropathy and mild moderate neural foraminal stenosis bilaterally.    This report was flagged in Epic as abnormal.      Impression       Multilevel degenerative change lumbar spine most pronounced at L4/L5 with posterior disc osteophyte with ligamentum flavum hypertrophy and small annular fissure there is mild central canal and neural foraminal stenosis.    There is heterogeneous T1 bone marrow signal throughout the lumbosacral spine most compatible with red marrow reconversion clinical correlation for possible underlying anemia.     EMG 7/5/2018:  Normal study    Allergies:   Review of patient's allergies indicates:   Allergen Reactions    Lipitor [atorvastatin] Swelling       Current Medications:   Current Outpatient Medications   Medication Sig Dispense Refill    gabapentin (NEURONTIN) 400 MG capsule Take 2 capsules (800 mg total) by mouth 3 (three) times daily. 180 capsule 4    hydroCHLOROthiazide (HYDRODIURIL) 25 MG tablet        No current facility-administered medications for this visit.      Facility-Administered Medications Ordered in Other Visits   Medication Dose Route Frequency Provider Last Rate Last Dose    0.9%  NaCl infusion  500 mL Intravenous Continuous Adryan More MD           REVIEW OF SYSTEMS:    GENERAL:  No weight  loss, malaise or fevers.  HEENT:  Negative for frequent or significant headaches.  NECK:  Negative for lumps, goiter, pain and significant neck swelling.  RESPIRATORY:  Negative for cough, wheezing or shortness of breath.  CARDIOVASCULAR:  Negative for chest pain, leg swelling or palpitations. HTN  GI:  Negative for abdominal discomfort, blood in stools or black stools or change in bowel habits.  MUSCULOSKELETAL:  See HPI.  SKIN:  Negative for lesions, rash, and itching.  PSYCH:  Negative for sleep disturbance, mood disorder and recent psychosocial stressors.  HEMATOLOGY/LYMPHOLOGY:  Negative for prolonged bleeding, bruising easily or swollen nodes.  NEURO:   No history of headaches, syncope, paralysis, seizures or tremors.  All other reviewed and negative other than HPI.    Past Medical History:  Past Medical History:   Diagnosis Date    Heart murmur     Hypertension        Past Surgical History:  Past Surgical History:   Procedure Laterality Date    INJECTION, STEROID, EPIDURAL, TRANSFORAMINAL APPROACH Right 12/3/2018    Performed [facility] at [facility] PAIN MGT    Injection,steroid,epidural,transforaminal approach RIGHT L5 AND S1 TF PAVITHRA Right 10/29/2018    Performed [facility] at [facility] PAIN MGT    Injection,steroid,epidural,transforaminal approach, RIGHT L4 and S1 TF PAVITHRA WITH BUPIVACAINE Right 8/30/2018    [facility]    KNEE SURGERY         Family History:  Family History   Problem Relation Age of Onset    Arthritis Mother     Cirrhosis Father        Social History:  Social History     Socioeconomic History    Marital status:      Spouse name: Not on file    Number of children: Not on file    Years of education: Not on file    Highest education level: Not on file   Occupational History    Not on file   Social Needs    Financial resource strain: Not on file    Food insecurity:     Worry: Not on file     Inability: Not on file    Transportation  "needs:     Medical: Not on file     Non-medical: Not on file   Tobacco Use    Smoking status: Never Smoker    Smokeless tobacco: Never Used   Substance and Sexual Activity    Alcohol use: Yes    Drug use: No    Sexual activity: Not on file   Lifestyle    Physical activity:     Days per week: Not on file     Minutes per session: Not on file    Stress: Not on file   Relationships    Social connections:     Talks on phone: Not on file     Gets together: Not on file     Attends Faith service: Not on file     Active member of club or organization: Not on file     Attends meetings of clubs or organizations: Not on file     Relationship status: Not on file   Other Topics Concern    Not on file   Social History Narrative    Not on file       OBJECTIVE:    BP (!) 156/109   Pulse 87   Temp 99.1 °F (37.3 °C)   Ht 5' 9" (1.753 m)   Wt 63.2 kg (139 lb 5.3 oz)   BMI 20.58 kg/m²     PHYSICAL EXAMINATION:    General appearance: Well appearing, in no acute distress, alert and oriented x3.  Psych:  Mood and affect appropriate.  Skin: Skin color, texture, turgor normal, no rashes or lesions, in both upper and lower body.  Head/face:  Atraumatic, normocephalic. No palpable lymph nodes.  Cor: RRR  Pulm: CTA  GI: Abdomen soft and non-tender.  Back: Straight leg raising in the sitting position is positive for radicular pain on the right, negative on the left. There is pain to palpation over the lumbar spine. Limited ROM with pain on flexion and extension. Positive facet loading on the right.   Extremities: Peripheral joint ROM is full and pain free without obvious instability or laxity in all four extremities. No deformities, edema, or skin discoloration. Good capillary refill.  Musculoskeletal: Shoulder, hip, sacroiliac and knee provocative maneuvers are negative. Bilateral lower extremity strength is normal and symmetric.  No atrophy or tone abnormalities are noted.  Neuro: Bilateral lower extremity coordination and " muscle stretch reflexes are physiologic and symmetric.  Plantar response are downgoing. Decreased sensation to right calf and foot.   Gait: Antalgic- ambulates without assistance.     ASSESSMENT: 58 y.o. year old male with low back and leg pain, consistent with the followin. Lumbar radiculopathy     2. Osteoarthritis of spine with radiculopathy, lumbar region     3. Facet arthritis of lumbar region     4. DDD (degenerative disc disease), lumbar     5. Chronic pain disorder           PLAN:     - Previous imaging was reviewed and discussed with the patient today.    - Schedule for right L4 and L5 TF PAVITHRA. This has given him the best relief in the past.   The procedure, risks, benefits and options were discussed with patient. There are no contraindications to the procedure. The patient expressed understanding and agreed to proceed.  Consent obtained today.    - Consider lumbar medial branch blocks.     - We discussed financial assistance through [facility]. He previously was not a candidate but will re-apply given recent job change.    - Continue Gabapentin 800 mg TID.     - I have stressed the importance of physical activity and a home exercise plan to help with pain and improve health.    - RTC 2 weeks after above procedure.      - Counseled patient regarding the importance of activity modification and constant sleeping habits.    - Dr. Yadav was consulted on the patient and agrees with this plan.    The above plan and management options were discussed at length with patient. Patient is in agreement with the above and verbalized understanding.    Ella Herrera NP  2019

## 2019-07-11 DIAGNOSIS — M51.36 DDD (DEGENERATIVE DISC DISEASE), LUMBAR: ICD-10-CM

## 2019-07-11 DIAGNOSIS — M54.16 LUMBAR RADICULOPATHY: ICD-10-CM

## 2019-07-11 DIAGNOSIS — M47.26 OSTEOARTHRITIS OF SPINE WITH RADICULOPATHY, LUMBAR REGION: ICD-10-CM

## 2019-07-11 DIAGNOSIS — M47.816 FACET ARTHRITIS OF LUMBAR REGION: ICD-10-CM

## 2019-07-11 RX ORDER — GABAPENTIN 400 MG/1
CAPSULE ORAL
Qty: 120 CAPSULE | Refills: 4 | Status: SHIPPED | OUTPATIENT
Start: 2019-07-11 | End: 2019-12-09 | Stop reason: SDUPTHER

## 2019-08-01 ENCOUNTER — HOSPITAL ENCOUNTER (OUTPATIENT)
Facility: OTHER | Age: 59
Discharge: HOME | End: 2019-08-01
Attending: ANESTHESIOLOGY | Admitting: ANESTHESIOLOGY
Payer: COMMERCIAL

## 2019-08-01 VITALS
TEMPERATURE: 99 F | DIASTOLIC BLOOD PRESSURE: 98 MMHG | RESPIRATION RATE: 16 BRPM | OXYGEN SATURATION: 98 % | WEIGHT: 145 LBS | HEART RATE: 88 BPM | BODY MASS INDEX: 21.48 KG/M2 | SYSTOLIC BLOOD PRESSURE: 149 MMHG | HEIGHT: 69 IN

## 2019-08-01 DIAGNOSIS — M47.816 LUMBAR SPONDYLOSIS: ICD-10-CM

## 2019-08-01 DIAGNOSIS — M54.16 LUMBAR RADICULOPATHY: Primary | ICD-10-CM

## 2019-08-01 PROCEDURE — 64484 NJX AA&/STRD TFRM EPI L/S EA: CPT | Performed by: ANESTHESIOLOGY

## 2019-08-01 PROCEDURE — 64483 PR EPIDURAL INJ, ANES/STEROID, TRANSFORAMINAL, LUMB/SACR, SNGL LEVL: ICD-10-PCS | Mod: RT,,, | Performed by: ANESTHESIOLOGY

## 2019-08-01 PROCEDURE — 64483 NJX AA&/STRD TFRM EPI L/S 1: CPT | Performed by: ANESTHESIOLOGY

## 2019-08-01 PROCEDURE — 64483 NJX AA&/STRD TFRM EPI L/S 1: CPT | Mod: RT,,, | Performed by: ANESTHESIOLOGY

## 2019-08-01 PROCEDURE — 25500020 PHARM REV CODE 255: Performed by: ANESTHESIOLOGY

## 2019-08-01 PROCEDURE — 63600175 PHARM REV CODE 636 W HCPCS: Performed by: ANESTHESIOLOGY

## 2019-08-01 PROCEDURE — 64484 PRA INJECT ANES/STEROID FORAMEN LUMBAR/SACRAL W IMG GUIDE ,EA ADD LEVEL: ICD-10-PCS | Mod: RT,,, | Performed by: ANESTHESIOLOGY

## 2019-08-01 PROCEDURE — 25000003 PHARM REV CODE 250: Performed by: ANESTHESIOLOGY

## 2019-08-01 PROCEDURE — 64484 NJX AA&/STRD TFRM EPI L/S EA: CPT | Mod: RT,,, | Performed by: ANESTHESIOLOGY

## 2019-08-01 RX ORDER — LIDOCAINE HYDROCHLORIDE 10 MG/ML
INJECTION INFILTRATION; PERINEURAL
Status: DISCONTINUED | OUTPATIENT
Start: 2019-08-01 | End: 2019-08-01 | Stop reason: HOSPADM

## 2019-08-01 RX ORDER — LIDOCAINE HYDROCHLORIDE 10 MG/ML
INJECTION, SOLUTION EPIDURAL; INFILTRATION; INTRACAUDAL; PERINEURAL
Status: DISCONTINUED | OUTPATIENT
Start: 2019-08-01 | End: 2019-08-01 | Stop reason: HOSPADM

## 2019-08-01 RX ORDER — ALPRAZOLAM 0.5 MG/1
1 TABLET ORAL
Status: COMPLETED | OUTPATIENT
Start: 2019-08-01 | End: 2019-08-01

## 2019-08-01 RX ORDER — DEXAMETHASONE SODIUM PHOSPHATE 100 MG/10ML
INJECTION INTRAMUSCULAR; INTRAVENOUS
Status: DISCONTINUED | OUTPATIENT
Start: 2019-08-01 | End: 2019-08-01 | Stop reason: HOSPADM

## 2019-08-01 RX ORDER — SODIUM CHLORIDE 9 MG/ML
500 INJECTION, SOLUTION INTRAVENOUS CONTINUOUS
Status: DISCONTINUED | OUTPATIENT
Start: 2019-08-01 | End: 2019-08-01 | Stop reason: HOSPADM

## 2019-08-01 RX ADMIN — ALPRAZOLAM 1 MG: 0.5 TABLET ORAL at 01:08

## 2019-08-01 NOTE — H&P
"DMITRIY  Patient presenting for Procedure(s) (LRB):  INJECTION, STEROID, EPIDURAL, TRANSFORAMINAL APPROACH (Right)     Patient on Anti-coagulation No    No health changes since previous encounter    Past Medical History:   Diagnosis Date    Heart murmur     Hypertension      Past Surgical History:   Procedure Laterality Date    INJECTION, STEROID, EPIDURAL, TRANSFORAMINAL APPROACH Right 12/3/2018    Performed [facility] PA[facility]GT    Injection,steroid,epidural,transforaminal approach RIGHT L5 AND S1 TF PAVITHRA Right 10/29/2018    Performed by [facility] at [facility] PA[facility]GT    Injection,steroid,epidural,transforaminal approach, RIGHT L4 and S1 TF PAVITHRA WITH BUPIVACAINE Right 8/30/2018    [facility] at [facility]    KNEE SURGERY       Review of patient's allergies indicates:   Allergen Reactions    Lipitor [atorvastatin] Swelling      Current Facility-Administered Medications   Medication    0.9%  NaCl infusion     Facility-Administered Medications Ordered in Other Encounters   Medication    0.9%  NaCl infusion       PMHx, PSHx, Allergies, Medications reviewed in epic    ROS negative except pain complaints in HPI    OBJECTIVE:    BP (!) 163/100 (BP Location: Right arm, Patient Position: Lying)   Pulse 106   Temp 98.9 °F (37.2 °C) (Oral)   Ht 5' 9" (1.753 m)   Wt 65.8 kg (145 lb)   SpO2 100%   BMI 21.41 kg/m²     PHYSICAL EXAMINATION:    GENERAL: Well appearing, in no acute distress, alert and oriented x3.  PSYCH:  Mood and affect appropriate.  SKIN: Skin color, texture, turgor normal, no rashes or lesions which will impact the procedure.  CV: RRR with palpation of the radial artery.  PULM: No evidence of respiratory difficulty, symmetric chest rise. Clear to auscultation.  NEURO: Cranial nerves grossly intact.    Plan:    Proceed with procedure as planned Procedure(s) (LRB):  INJECTION, STEROID, EPIDURAL, TRANSFORAMINAL APPROACH (Right)    Ari" Jenelle  08/01/2019

## 2019-08-01 NOTE — DISCHARGE SUMMARY
Discharge Note  Short Stay      SUMMARY     Admit Date: 8/1/2019    Attending Physician: Pascual Yadav      Discharge Physician: Pascual Yadav      Discharge Date: 8/1/2019 2:10 PM    Procedure(s) (LRB):  INJECTION, STEROID, EPIDURAL, TRANSFORAMINAL APPROACH (Right)    Final Diagnosis: Lumbar radiculopathy [M54.16]  DDD (degenerative disc disease), lumbar [M51.36]    Disposition: Home or self care    Patient Instructions:   Current Discharge Medication List      CONTINUE these medications which have NOT CHANGED    Details   !! gabapentin (NEURONTIN) 400 MG capsule Take 2 capsules (800 mg total) by mouth 3 (three) times daily.  Qty: 180 capsule, Refills: 4    Associated Diagnoses: Lumbar radiculopathy; Osteoarthritis of spine with radiculopathy, lumbar region; Facet arthritis of lumbar region; DDD (degenerative disc disease), lumbar      !! gabapentin (NEURONTIN) 400 MG capsule TAKE 1 CAPSULE BY MOUTH ONCE DAILY IN THE MORNING AND 1 ONCE DAILY IN THE AFTERNOON AND 2 ONCE DAILY AT BEDTIME  Qty: 120 capsule, Refills: 4    Comments: Please consider 90 day supplies to promote better adherence  Associated Diagnoses: Lumbar radiculopathy; Osteoarthritis of spine with radiculopathy, lumbar region; Facet arthritis of lumbar region; DDD (degenerative disc disease), lumbar      hydroCHLOROthiazide (HYDRODIURIL) 25 MG tablet        !! - Potential duplicate medications found. Please discuss with provider.              Discharge Diagnosis: Lumbar radiculopathy [M54.16]  DDD (degenerative disc disease), lumbar [M51.36]  Condition on Discharge: Stable with no complications to procedure   Diet on Discharge: Same as before.  Activity: as per instruction sheet.  Discharge to: Home with a responsible adult.  Follow up: 2-4 weeks       Please call my office or pager at [phone] if experienced any weakness or loss of sensation, fever > 101.5, pain uncontrolled with oral medications, persistent nausea/vomiting/or diarrhea, redness or  drainage from the incisions, or any other worrisome concerns. If physician on call was not reached or could not communicate with our office for any reason please go to the nearest emergency department

## 2019-08-01 NOTE — DISCHARGE INSTRUCTIONS
Thank you for allowing us to care for you today. You may receive a survey about the care we provided. Your feedback is valuable and helps us provide excellent care throughout the community.     Home Care Instructions for Pain Management:    1. DIET:   You may resume your normal diet today.   2. BATHING:   You may shower with luke warm water. No tub baths or anything that will soak injection sites under water for the next 24 hours.  3. DRESSING:   You may remove your bandage today.   4. ACTIVITY LEVEL:   You may resume your normal activities 24 hrs after your procedure. Nothing strenuous today.  5. MEDICATIONS:   You may resume your normal medications today. To restart blood thinners, ask your doctor.  6. DRIVING    If you have received any sedatives by mouth today, you may not drive for 12 hours.    If you have received any sedation through your IV, you may not drive for 24 hrs.   7. SPECIAL INSTRUCTIONS:   No heat to the injection site for 24 hrs including, hot bath or shower, heating pad, moist heat, or hot tubs.    Use ice pack to injection site for any pain or discomfort.  Apply ice packs for 20 minute intervals as needed.    IF you have diabetes, be sure to monitor your blood sugar more closely. IF your injection contained steroids your blood sugar levels may become higher than normal.    If you are still having pain upon discharge:  Your pain may improve over the next 48 hours. The anesthetic (numbing medication) works immediately to 48 hours. IF your injection contained a steroid (anti-inflammatory medication), it takes approximately 3 days to start feeling relief and 7-10 days to see your greatest results from the medication. It is possible you may need subsequent injections. This would be discussed at your follow up appointment with pain management or your referring doctor.      PLEASE CALL YOUR DOCTOR IF:  1. Redness or swelling around the injection site.  2. Fever of 101 degrees or more  3. Drainage  (pus) from the injection site.  4. For any continuous bleeding (some dried blood over the incision is normal.)    FOR EMERGENCIES:   If any unusual problems or difficulties occur during clinic hours, call [phone] or [phone].

## 2019-08-01 NOTE — OP NOTE
Date of Service: 08/01/2019    PCP: Nasrin Benz PA-C    Referring Physician:    Time-out taken to identify patient and procedure side prior to starting the procedure.   I attest that I have reviewed the patient's home medications prior to the procedure and no contraindication have been identified. I  re-evaluated the patient after the patient was positioned for the procedure in the procedure room immediately before the procedural time-out. The vital signs are current and represent the current state of the patient which has not significantly changed since the preprocedure assessment.                                                           PROCEDURE: Right L4 & 5 transforaminal epidural steroid injection under fluoroscopy    REASON FOR PROCEDURE: Right Lumbar radiculopathy [M54.16]  DDD (degenerative disc disease), lumbar [M51.36]  1. Lumbar radiculopathy    2. Lumbar spondylosis      POSTPROCEDURE DIAGNOSIS:   Lumbar radiculopathy [M54.16]  DDD (degenerative disc disease), lumbar [M51.36]    1. Lumbar radiculopathy    2. Lumbar spondylosis           PHYSICIAN: Pascual Yadav MD  ASSISTANTS:  Ari Almanzar MD fellow      MEDICATIONS INJECTED:  Preservative-free dexamethasone 10mg, Xylocaine 1% MPF 3-5ml. 3ml per level. Preservative free, sterile normal saline is used to get larger volume as needed.  LOCAL ANESTHETIC INJECTED:  Xylocaine 1% 9ml with Sodium Bicarbonate 1ml. 3ml per site.    SEDATION MEDICATIONS: None    ESTIMATED BLOOD LOSS:  None.    COMPLICATIONS:  None.    TECHNIQUE:   Laying in a prone position, the patient was prepped and draped in the usual sterile fashion using ChloraPrep and fenestrated drape.  The area to be injected was determined under fluoroscopic guidance.  Local anesthetic was given by raising a wheel and going down to the hub of a 27-gauge 1.25 inch needle.  The 3.5inch 22-gauge spinal needle was introduced towards the transverse process of each above named nerve root level.  The  needle was walked medially then hinged into the neural foramen.  Omnipaque was injected to confirm appropriate placement and that there was no vascular runoff.  The medication was then injected after applying negative pressure. The patient tolerated the procedure well.    PAIN BEFORE THE PROCEDURE: 8/10.    PAIN AFTER THE PROCEDURE: 0/10.    The patient was monitored after the procedure.  Patient was given post procedure and discharge instructions to follow at home.  We will see the patient back in two weeks or the patient may call to inform of status. The patient was discharged in a stable condition.

## 2019-08-07 ENCOUNTER — TELEPHONE (OUTPATIENT)
Dept: PAIN MEDICINE | Facility: CLINIC | Age: 59
End: 2019-08-07

## 2019-08-07 NOTE — TELEPHONE ENCOUNTER
Spoke to Mr. Liu, informed him that only his actual appointment dates and procedure dates will be covered.     He explained that he has been out of work for two months. One month was a vacation and the other one is due to his pain.     He was informed that we will cover the appointment dates over the last two months.   06/10/2019  07/01/2019  08/01/2019  08/20/2019    Forms will be sent to Disability department.

## 2019-08-07 NOTE — TELEPHONE ENCOUNTER
----- Message from Janay Mcdermott sent at 8/7/2019 10:27 AM CDT -----  Contact: WILIAN RODRÍGUEZ [[id number]]    Name of Who is Calling: WILIAN RODRÍGUEZ [[id number]]       What is the request in detail: Patient is requesting a call from staff in regards to his disability paperwork that he dropped off on Monday  .....Please contact to further discuss and advise.     Can the clinic reply by [facility]: No     What Number to Call Back if not in [place]:  [phone]

## 2019-08-16 ENCOUNTER — TELEPHONE (OUTPATIENT)
Dept: PAIN MEDICINE | Facility: CLINIC | Age: 59
End: 2019-08-16

## 2019-08-16 NOTE — TELEPHONE ENCOUNTER
My name is Staff, I am contacting you from [facility] pain [facility] regarding your appointment scheduled for 08.20.19, with Provider Dr. Yadav, just confirming you will be able to make it.    If you feel you need to reschedule or canceled please give our office a call so we can better assist you.      Staff requesting patient to arrive 15 mins ahead of schedule appointment time.    Pt verbalized understanding and has confirmed appt

## 2019-08-20 ENCOUNTER — OFFICE VISIT (OUTPATIENT)
Dept: PAIN MEDICINE | Facility: CLINIC | Age: 59
End: 2019-08-20
Attending: ANESTHESIOLOGY
Payer: COMMERCIAL

## 2019-08-20 VITALS
HEART RATE: 93 BPM | BODY MASS INDEX: 21.55 KG/M2 | WEIGHT: 145.5 LBS | DIASTOLIC BLOOD PRESSURE: 103 MMHG | TEMPERATURE: 99 F | HEIGHT: 69 IN | SYSTOLIC BLOOD PRESSURE: 158 MMHG

## 2019-08-20 DIAGNOSIS — M47.26 OSTEOARTHRITIS OF SPINE WITH RADICULOPATHY, LUMBAR REGION: ICD-10-CM

## 2019-08-20 DIAGNOSIS — M51.36 DDD (DEGENERATIVE DISC DISEASE), LUMBAR: Primary | ICD-10-CM

## 2019-08-20 PROCEDURE — 99213 PR OFFICE/OUTPT VISIT, EST, LEVL III, 20-29 MIN: ICD-10-PCS | Mod: S$GLB,,, | Performed by: ANESTHESIOLOGY

## 2019-08-20 PROCEDURE — 3008F PR BODY MASS INDEX (BMI) DOCUMENTED: ICD-10-PCS | Mod: CPTII,S$GLB,, | Performed by: ANESTHESIOLOGY

## 2019-08-20 PROCEDURE — 99213 OFFICE O/P EST LOW 20 MIN: CPT | Mod: S$GLB,,, | Performed by: ANESTHESIOLOGY

## 2019-08-20 PROCEDURE — 99999 PR PBB SHADOW E&M-EST. PATIENT-LVL III: CPT | Mod: PBBFAC,,, | Performed by: ANESTHESIOLOGY

## 2019-08-20 PROCEDURE — 3008F BODY MASS INDEX DOCD: CPT | Mod: CPTII,S$GLB,, | Performed by: ANESTHESIOLOGY

## 2019-08-20 PROCEDURE — 99999 PR PBB SHADOW E&M-EST. PATIENT-LVL III: ICD-10-PCS | Mod: PBBFAC,,, | Performed by: ANESTHESIOLOGY

## 2019-08-20 NOTE — PROGRESS NOTES
Chronic patient Established Note (Follow up visit)      SUBJECTIVE:    Nacho Liu presents to the clinic for a follow-up appointment for low back and leg pain. He is s/p right L4 and L5 TF PAVITHRA on 8/1 with significant relief (70%). The pain is further improved with walking and gabapentin. The PAVITHRA most helped his cramping leg pain, but he still has some remaining burning/neuropathic-type pain in the affected leg. He finds the pain to be tolerable and does not affect his ADLs. He has been on disability and wishes to return to work. Patient is taking gabapentin 1,600mg/day rather than the 2,400mg/day as prescribed due to misunderstanding.       Pain Disability Index Review:  Last 3 PDI Scores 8/20/2019 7/1/2019 6/10/2019   Pain Disability Index (PDI) 14 58 64       Pain Medications:  Gabapentin 400 mg at breakfast and lunch, 800mg at dinner. Total: 1600 mg/day    Opioid Contract: no     report:  Reviewed and consistent with medication use as prescribed.    Pain Procedures:   2 previous ESIs at [facility] in 2018  8/30/2018- Right L4 and S1 TF PAVITHRA   10/29/2018- Right L5 and S1 TF PAVITHRA  12/3/2018- Right L4 and L5 TF PAVITHRA  8/1/2019- Right L4 and L5 TF ES    Physical Therapy/Home Exercise: no    Imaging:   MRI Lumbar Spine 5/31/2018:  FINDINGS:  There is straightening of the normal lumbar lordosis.  Heterogeneous T1 bone marrow signal throughout the lumbar spine without focal bone marrow edema.  Overall concerning for  red marrow reconversion clinical correlation for underlying chronic anemia, no evidence for focal infiltrative process.    There is scattered endplate degeneration most prominent at L4/L5 level with superimposed disc desiccation and height loss.    The distal spinal cord and conus is normal in signal and contour tip of the conus approximates the inferior L1 level.    No aneurysmal dilatation of the visualized abdominal aorta.    T12/L1 through L1/L2: No significant disc bulge, central canal or neural  foraminal stenosis.    L2/L3: No significant disc bulge central canal or neural foraminal stenosis.    L3/L4:Small posterior disc osteophyte without significant central canal or neural foraminal stenosis    L4/L5:Posterior disc osteophyte with ligamentum flavum hypertrophy and small annular fissure with mild central canal stenosis and mild neural foraminal narrowing.    L5/S1: Small posterior disc osteophyte with ligamentum flavum hypertrophy without significant central canal stenosis with attenuation of the thecal sac related to epidural lipomatosis.  There is facet joint arthropathy and mild moderate neural foraminal stenosis bilaterally.    This report was flagged in Epic as abnormal.      Impression       Multilevel degenerative change lumbar spine most pronounced at L4/L5 with posterior disc osteophyte with ligamentum flavum hypertrophy and small annular fissure there is mild central canal and neural foraminal stenosis.    There is heterogeneous T1 bone marrow signal throughout the lumbosacral spine most compatible with red marrow reconversion clinical correlation for possible underlying anemia.     EMG 7/5/2018:  Normal study    Allergies:   Review of patient's allergies indicates:   Allergen Reactions    Lipitor [atorvastatin] Swelling       Current Medications:   Current Outpatient Medications   Medication Sig Dispense Refill    gabapentin (NEURONTIN) 400 MG capsule Take 2 capsules (800 mg total) by mouth 3 (three) times daily. 180 capsule 4    gabapentin (NEURONTIN) 400 MG capsule TAKE 1 CAPSULE BY MOUTH ONCE DAILY IN THE MORNING AND 1 ONCE DAILY IN THE AFTERNOON AND 2 ONCE DAILY AT BEDTIME 120 capsule 4    hydroCHLOROthiazide (HYDRODIURIL) 25 MG tablet        No current facility-administered medications for this visit.      Facility-Administered Medications Ordered in Other Visits   Medication Dose Route Frequency Provider Last Rate Last Dose    0.9%  NaCl infusion  500 mL Intravenous Continuous Adryan  CONSTANTINO More MD           REVIEW OF SYSTEMS:    GENERAL:  No weight loss, malaise or fevers.  HEENT:  Negative for frequent or significant headaches.  NECK:  Negative for lumps, goiter, pain and significant neck swelling.  RESPIRATORY:  Negative for cough, wheezing or shortness of breath.  CARDIOVASCULAR:  Negative for chest pain, leg swelling or palpitations. HTN  GI:  Negative for abdominal discomfort, blood in stools or black stools or change in bowel habits.  MUSCULOSKELETAL:  See HPI.  SKIN:  Negative for lesions, rash, and itching.  PSYCH:  Negative for sleep disturbance, mood disorder and recent psychosocial stressors.  HEMATOLOGY/LYMPHOLOGY:  Negative for prolonged bleeding, bruising easily or swollen nodes.  NEURO:   No history of headaches, syncope, paralysis, seizures or tremors.  All other reviewed and negative other than HPI.    Past Medical History:  Past Medical History:   Diagnosis Date    Heart murmur     Hypertension        Past Surgical History:  Past Surgical History:   Procedure Laterality Date    INJECTION, STEROID, EPIDURAL, TRANSFORAMINAL APPROACH Right 8/1/2019    Performed [facility] at [facility]    INJECTION, STEROID, EPIDURAL, TRANSFORAMINAL APPROACH Right 12/3/2018    [facility] at [facility]    Injection,steroid,epidural,transforaminal approach RIGHT L5 AND S1 TF PAVITHRA Right 10/29/2018    Per[facility] at [facility]    Injection,steroid,epidural,transforaminal approach, RIGHT L4 and S1 TF PAVITHRA WITH BUPIVACAINE Right 8/30/2018    Per[facility] at [facility]    KNEE SURGERY         Family History:  Family History   Problem Relation Age of Onset    Arthritis Mother     Cirrhosis Father        Social History:  Social History     Socioeconomic History    Marital status:      Spouse name: Not on file    Number of children: Not on file    Years of education: Not on file    Highest education level: Not on file  "  Occupational History    Not on file   Social Needs    Financial resource strain: Not on file    Food insecurity:     Worry: Not on file     Inability: Not on file    Transportation needs:     Medical: Not on file     Non-medical: Not on file   Tobacco Use    Smoking status: Never Smoker    Smokeless tobacco: Never Used   Substance and Sexual Activity    Alcohol use: Yes    Drug use: No    Sexual activity: Not on file   Lifestyle    Physical activity:     Days per week: Not on file     Minutes per session: Not on file    Stress: Not on file   Relationships    Social connections:     Talks on phone: Not on file     Gets together: Not on file     Attends [employer] service: Not on file     Active member of club or organization: Not on file     Attends meetings of clubs or organizations: Not on file     Relationship status: Not on file   Other Topics Concern    Not on file   Social History Narrative    Not on file       OBJECTIVE:    BP (!) 158/103   Pulse 93   Temp 98.6 °F (37 °C)   Ht 5' 9" (1.753 m)   Wt 66 kg (145 lb 8.1 oz)   BMI 21.49 kg/m²     PHYSICAL EXAMINATION:    General appearance: Well appearing, in no acute distress, alert and oriented x3.  Psych:  Mood and affect appropriate.  Skin: Skin color, texture, turgor normal, no rashes or lesions, in both upper and lower body.  Head/face:  Atraumatic, normocephalic. No palpable lymph nodes.  Cor: RRR  Pulm: CTA  GI: Abdomen soft and non-tender.  Back: Straight leg raising in the sitting position is positive for radicular pain on the right, negative on the left. There is pain to palpation over the lumbar spine. Limited ROM with pain on flexion and extension. Positive facet loading on the right.   Extremities: Peripheral joint ROM is full and pain free without obvious instability or laxity in all four extremities. No deformities, edema, or skin discoloration. Good capillary refill.  Musculoskeletal: Shoulder, hip, sacroiliac and knee " provocative maneuvers are negative. Bilateral lower extremity strength is normal and symmetric.  No atrophy or tone abnormalities are noted.  Neuro: Bilateral lower extremity coordination and muscle stretch reflexes are physiologic and symmetric.  Plantar response are downgoing. Decreased sensation to right calf and foot.   Gait: Antalgic- ambulates without assistance.     ASSESSMENT: 58 y.o. year old male with low back and leg pain, consistent with the followin. DDD (degenerative disc disease), lumbar     2. Osteoarthritis of spine with radiculopathy, lumbar region           PLAN:     - Previous imaging was reviewed and discussed with the patient today.    - Consider repeat L4 and L5 TF PAVITHRA if patient no longer tolerating current level of pain    - Return to work paperwork filled out in clinic    - Reinforced Gabapentin 800 mg TID.     - RTC in 3mo with MELODY    - I have stressed the importance of physical activity and a home exercise plan to help with pain and improve health.    - Counseled patient regarding the importance of activity modification and constant sleeping habits.    - Dr. Yadav was consulted on the patient and agrees with this plan.    The above plan and management options were discussed at length with patient. Patient is in agreement with the above and verbalized understanding.    Tono Paiz MD  2019    I have personally taken the history and examined this patient and agree with the resident's note as stated above.

## 2019-09-06 ENCOUNTER — TELEPHONE (OUTPATIENT)
Dept: SPINE | Facility: CLINIC | Age: 59
End: 2019-09-06

## 2019-12-09 DIAGNOSIS — M47.816 FACET ARTHRITIS OF LUMBAR REGION: ICD-10-CM

## 2019-12-09 DIAGNOSIS — M54.16 LUMBAR RADICULOPATHY: ICD-10-CM

## 2019-12-09 DIAGNOSIS — M51.36 DDD (DEGENERATIVE DISC DISEASE), LUMBAR: ICD-10-CM

## 2019-12-09 DIAGNOSIS — M47.26 OSTEOARTHRITIS OF SPINE WITH RADICULOPATHY, LUMBAR REGION: ICD-10-CM

## 2019-12-10 RX ORDER — GABAPENTIN 400 MG/1
CAPSULE ORAL
Qty: 120 CAPSULE | Refills: 4 | Status: SHIPPED | OUTPATIENT
Start: 2019-12-10 | End: 2020-06-02 | Stop reason: SDUPTHER

## 2020-01-21 ENCOUNTER — OFFICE VISIT (OUTPATIENT)
Dept: SPINE | Facility: CLINIC | Age: 60
End: 2020-01-21
Attending: ANESTHESIOLOGY
Payer: COMMERCIAL

## 2020-01-21 VITALS
WEIGHT: 156.94 LBS | DIASTOLIC BLOOD PRESSURE: 82 MMHG | HEIGHT: 69 IN | HEART RATE: 89 BPM | TEMPERATURE: 98 F | SYSTOLIC BLOOD PRESSURE: 142 MMHG | OXYGEN SATURATION: 100 % | BODY MASS INDEX: 23.25 KG/M2 | RESPIRATION RATE: 18 BRPM

## 2020-01-21 DIAGNOSIS — G89.4 CHRONIC PAIN DISORDER: ICD-10-CM

## 2020-01-21 DIAGNOSIS — M54.16 LUMBAR RADICULOPATHY: Primary | ICD-10-CM

## 2020-01-21 DIAGNOSIS — M47.816 LUMBAR SPONDYLOSIS: ICD-10-CM

## 2020-01-21 PROCEDURE — 99213 PR OFFICE/OUTPT VISIT, EST, LEVL III, 20-29 MIN: ICD-10-PCS | Mod: S$GLB,,, | Performed by: ANESTHESIOLOGY

## 2020-01-21 PROCEDURE — 99999 PR PBB SHADOW E&M-EST. PATIENT-LVL III: ICD-10-PCS | Mod: PBBFAC,,, | Performed by: ANESTHESIOLOGY

## 2020-01-21 PROCEDURE — 3008F PR BODY MASS INDEX (BMI) DOCUMENTED: ICD-10-PCS | Mod: CPTII,S$GLB,, | Performed by: ANESTHESIOLOGY

## 2020-01-21 PROCEDURE — 3008F BODY MASS INDEX DOCD: CPT | Mod: CPTII,S$GLB,, | Performed by: ANESTHESIOLOGY

## 2020-01-21 PROCEDURE — 99213 OFFICE O/P EST LOW 20 MIN: CPT | Mod: S$GLB,,, | Performed by: ANESTHESIOLOGY

## 2020-01-21 PROCEDURE — 99999 PR PBB SHADOW E&M-EST. PATIENT-LVL III: CPT | Mod: PBBFAC,,, | Performed by: ANESTHESIOLOGY

## 2020-01-21 NOTE — H&P (VIEW-ONLY)
Chronic patient Established Note (Follow up visit)      SUBJECTIVE:    Patient presents for f/u of R L4, L5 radicular pain. He takes gabapentin 2400mg per day for pain. His last TFESI was 8/2019 and his pain returned around November 2019 and has been worsening. His pain begins in his right hip and radiates down his leg to the bottom of his feet. He describes the pain as a burning.  He has done physical therapy in the past without much results. 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 Denies numbness, weakness, bowel incontinence, bladder retention.    Pain Disability Index Review:  Last 3 PDI Scores 8/20/2019 7/1/2019 6/10/2019   Pain Disability Index (PDI) 14 58 64       Pain Medications:  Gabapentin 400 mg at breakfast and lunch, 800mg at dinner. Total: 1600 mg/day    Opioid Contract: no     report:  Reviewed and consistent with medication use as prescribed.    Pain Procedures:   2 previous ESIs at [facility] in 2018  8/30/2018- Right L4 and S1 TF PAVITHRA   10/29/2018- Right L5 and S1 TF PAVITHRA  12/3/2018- Right L4 and L5 TF PAVITHRA  8/1/2019- Right L4 and L5 TF ES    Physical Therapy/Home Exercise: no    Imaging:   MRI Lumbar Spine 5/31/2018:  FINDINGS:  There is straightening of the normal lumbar lordosis.  Heterogeneous T1 bone marrow signal throughout the lumbar spine without focal bone marrow edema.  Overall concerning for  red marrow reconversion clinical correlation for underlying chronic anemia, no evidence for focal infiltrative process.    There is scattered endplate degeneration most prominent at L4/L5 level with superimposed disc desiccation and height loss.    The distal spinal cord and conus is normal in signal and contour tip of the conus approximates the inferior L1 level.    No aneurysmal dilatation of the visualized abdominal aorta.    T12/L1 through L1/L2: No significant disc bulge, central canal or neural foraminal stenosis.    L2/L3:  No significant disc bulge central canal or neural foraminal stenosis.    L3/L4:Small posterior disc osteophyte without significant central canal or neural foraminal stenosis    L4/L5:Posterior disc osteophyte with ligamentum flavum hypertrophy and small annular fissure with mild central canal stenosis and mild neural foraminal narrowing.    L5/S1: Small posterior disc osteophyte with ligamentum flavum hypertrophy without significant central canal stenosis with attenuation of the thecal sac related to epidural lipomatosis.  There is facet joint arthropathy and mild moderate neural foraminal stenosis bilaterally.    This report was flagged in Epic as abnormal.      Impression       Multilevel degenerative change lumbar spine most pronounced at L4/L5 with posterior disc osteophyte with ligamentum flavum hypertrophy and small annular fissure there is mild central canal and neural foraminal stenosis.    There is heterogeneous T1 bone marrow signal throughout the lumbosacral spine most compatible with red marrow reconversion clinical correlation for possible underlying anemia.     EMG 7/5/2018:  Normal study    Allergies:   Review of patient's allergies indicates:   Allergen Reactions    Lipitor [atorvastatin] Swelling       Current Medications:   Current Outpatient Medications   Medication Sig Dispense Refill    gabapentin (NEURONTIN) 400 MG capsule Take 2 capsules (800 mg total) by mouth 3 (three) times daily. 180 capsule 4    gabapentin (NEURONTIN) 400 MG capsule TAKE 1 CAPSULE BY MOUTH IN THE MORNING , 1 CAPSULE IN THE AFTERNOON AND 2 CAPSULES AT BEDTIME 120 capsule 4    hydroCHLOROthiazide (HYDRODIURIL) 25 MG tablet        No current facility-administered medications for this visit.      Facility-Administered Medications Ordered in Other Visits   Medication Dose Route Frequency Provider Last Rate Last Dose    0.9%  NaCl infusion  500 mL Intravenous Continuous Adryan More MD           REVIEW OF  SYSTEMS:    GENERAL:  No weight loss, malaise or fevers.  HEENT:  Negative for frequent or significant headaches.  NECK:  Negative for lumps, goiter, pain and significant neck swelling.  RESPIRATORY:  Negative for cough, wheezing or shortness of breath.  CARDIOVASCULAR:  Negative for chest pain, leg swelling or palpitations. HTN  GI:  Negative for abdominal discomfort, blood in stools or black stools or change in bowel habits.  MUSCULOSKELETAL:  See HPI.  SKIN:  Negative for lesions, rash, and itching.  PSYCH:  Negative for sleep disturbance, mood disorder and recent psychosocial stressors.  HEMATOLOGY/LYMPHOLOGY:  Negative for prolonged bleeding, bruising easily or swollen nodes.  NEURO:   No history of headaches, syncope, paralysis, seizures or tremors.  All other reviewed and negative other than HPI.    Past Medical History:  Past Medical History:   Diagnosis Date    Heart murmur     Hypertension        Past Surgical History:  Past Surgical History:   Procedure Laterality Date    KNEE SURGERY      TRANSFORAMINAL EPIDURAL INJECTION OF STEROID Right 8/1/2019    Procedure: INJECTION, STEROID, EPIDURAL, TRANSFORAMINAL APPROACH;  Surgeon: Pascual Yadav MD;  Location: [facility];  Service: Pain Management;  Laterality: Right;       Family History:  Family History   Problem Relation Age of Onset    Arthritis Mother     Cirrhosis Father        Social History:  Social History     Socioeconomic History    Marital status:      Spouse name: Not on file    Number of children: Not on file    Years of education: Not on file    Highest education level: Not on file   Occupational History    Not on file   Social Needs    Financial resource strain: Not on file    Food insecurity:     Worry: Not on file     Inability: Not on file    Transportation needs:     Medical: Not on file     Non-medical: Not on file   Tobacco Use    Smoking status: Never Smoker    Smokeless tobacco: Never Used   Substance and Sexual  Activity    Alcohol use: Yes    Drug use: No    Sexual activity: Not on file   Lifestyle    Physical activity:     Days per week: Not on file     Minutes per session: Not on file    Stress: Not on file   Relationships    Social connections:     Talks on phone: Not on file     Gets together: Not on file     Attends Yazdanism service: Not on file     Active member of club or organization: Not on file     Attends meetings of clubs or organizations: Not on file     Relationship status: Not on file   Other Topics Concern    Not on file   Social History Narrative    Not on file       OBJECTIVE:    There were no vitals taken for this visit.    PHYSICAL EXAMINATION:    General appearance: Well appearing, in no acute distress, alert and oriented x3.  Psych:  Mood and affect appropriate.  Skin: Skin color, texture, turgor normal, no rashes or lesions, in both upper and lower body.  Head/face:  Atraumatic, normocephalic. No palpable lymph nodes.  Cor: RRR  Pulm: CTA  GI: Abdomen soft and non-tender.  Back: Negative SLR bilaterally. Negative tenderness along lumbar spinous process or paraspinals. Limited ROM with pain on flexion and extension. Negative facet loading bialterally  Extremities: Peripheral joint ROM is full and pain free without obvious instability or laxity in all four extremities. No deformities, edema, or skin discoloration. Good capillary refill.  Musculoskeletal: Shoulder, hip, sacroiliac and knee provocative maneuvers are negative. Bilateral lower extremity strength is normal and symmetric.  No atrophy or tone abnormalities are noted.  Neuro: Bilateral lower extremity coordination and muscle stretch reflexes are physiologic and symmetric.  Plantar response are downgoing. Normal sensation bilateral lower extremities.  Gait: Normal    ASSESSMENT: 59 y.o. year old male with low back and leg pain, consistent with the followin. Lumbar radiculopathy     2. Chronic pain disorder     3. Lumbar  spondylosis           PLAN:     - I have stressed the importance of physical activity and a home exercise plan to help with pain and improve health.    - Patient can continue with medications for now since they are providing benefits, using them appropriately, and without side effects.    - Schedule for Right L4-5 and L5-S1 TFESI    - Recommend patient try Tylenol BID    - RTC 1 Month     Harlan Stern MD [facility]  I have personally taken the history and examined this patient and agree with the fellow's note as stated above.

## 2020-01-21 NOTE — PROGRESS NOTES
Chronic patient Established Note (Follow up visit)      SUBJECTIVE:    Patient presents for f/u of R L4, L5 radicular pain. He takes gabapentin 2400mg per day for pain. His last TFESI was 8/2019 and his pain returned around November 2019 and has been worsening. His pain begins in his right hip and radiates down his leg to the bottom of his feet. He describes the pain as a burning.  He has done physical therapy in the past without much results. 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 Denies numbness, weakness, bowel incontinence, bladder retention.    Pain Disability Index Review:  Last 3 PDI Scores 8/20/2019 7/1/2019 6/10/2019   Pain Disability Index (PDI) 14 58 64       Pain Medications:  Gabapentin 400 mg at breakfast and lunch, 800mg at dinner. Total: 1600 mg/day    Opioid Contract: no     report:  Reviewed and consistent with medication use as prescribed.    Pain Procedures:   2 previous ESIs at [facility] in 2018  8/30/2018- Right L4 and S1 TF PAVITHRA   10/29/2018- Right L5 and S1 TF PAVITHRA  12/3/2018- Right L4 and L5 TF PAVITHRA  8/1/2019- Right L4 and L5 TF ES    Physical Therapy/Home Exercise: no    Imaging:   MRI Lumbar Spine 5/31/2018:  FINDINGS:  There is straightening of the normal lumbar lordosis.  Heterogeneous T1 bone marrow signal throughout the lumbar spine without focal bone marrow edema.  Overall concerning for  red marrow reconversion clinical correlation for underlying chronic anemia, no evidence for focal infiltrative process.    There is scattered endplate degeneration most prominent at L4/L5 level with superimposed disc desiccation and height loss.    The distal spinal cord and conus is normal in signal and contour tip of the conus approximates the inferior L1 level.    No aneurysmal dilatation of the visualized abdominal aorta.    T12/L1 through L1/L2: No significant disc bulge, central canal or neural foraminal stenosis.    L2/L3:  No significant disc bulge central canal or neural foraminal stenosis.    L3/L4:Small posterior disc osteophyte without significant central canal or neural foraminal stenosis    L4/L5:Posterior disc osteophyte with ligamentum flavum hypertrophy and small annular fissure with mild central canal stenosis and mild neural foraminal narrowing.    L5/S1: Small posterior disc osteophyte with ligamentum flavum hypertrophy without significant central canal stenosis with attenuation of the thecal sac related to epidural lipomatosis.  There is facet joint arthropathy and mild moderate neural foraminal stenosis bilaterally.    This report was flagged in Epic as abnormal.      Impression       Multilevel degenerative change lumbar spine most pronounced at L4/L5 with posterior disc osteophyte with ligamentum flavum hypertrophy and small annular fissure there is mild central canal and neural foraminal stenosis.    There is heterogeneous T1 bone marrow signal throughout the lumbosacral spine most compatible with red marrow reconversion clinical correlation for possible underlying anemia.     EMG 7/5/2018:  Normal study    Allergies:   Review of patient's allergies indicates:   Allergen Reactions    Lipitor [atorvastatin] Swelling       Current Medications:   Current Outpatient Medications   Medication Sig Dispense Refill    gabapentin (NEURONTIN) 400 MG capsule Take 2 capsules (800 mg total) by mouth 3 (three) times daily. 180 capsule 4    gabapentin (NEURONTIN) 400 MG capsule TAKE 1 CAPSULE BY MOUTH IN THE MORNING , 1 CAPSULE IN THE AFTERNOON AND 2 CAPSULES AT BEDTIME 120 capsule 4    hydroCHLOROthiazide (HYDRODIURIL) 25 MG tablet        No current facility-administered medications for this visit.      Facility-Administered Medications Ordered in Other Visits   Medication Dose Route Frequency Provider Last Rate Last Dose    0.9%  NaCl infusion  500 mL Intravenous Continuous Adryan More MD           REVIEW OF  SYSTEMS:    GENERAL:  No weight loss, malaise or fevers.  HEENT:  Negative for frequent or significant headaches.  NECK:  Negative for lumps, goiter, pain and significant neck swelling.  RESPIRATORY:  Negative for cough, wheezing or shortness of breath.  CARDIOVASCULAR:  Negative for chest pain, leg swelling or palpitations. HTN  GI:  Negative for abdominal discomfort, blood in stools or black stools or change in bowel habits.  MUSCULOSKELETAL:  See HPI.  SKIN:  Negative for lesions, rash, and itching.  PSYCH:  Negative for sleep disturbance, mood disorder and recent psychosocial stressors.  HEMATOLOGY/LYMPHOLOGY:  Negative for prolonged bleeding, bruising easily or swollen nodes.  NEURO:   No history of headaches, syncope, paralysis, seizures or tremors.  All other reviewed and negative other than HPI.    Past Medical History:  Past Medical History:   Diagnosis Date    Heart murmur     Hypertension        Past Surgical History:  Past Surgical History:   Procedure Laterality Date    KNEE SURGERY      TRANSFORAMINAL EPIDURAL INJECTION OF STEROID Right 8/1/2019    Procedure: INJECTION, STEROID, EPIDURAL, TRANSFORAMINAL APPROACH;  Surgeon: Pascual Yadav MD;  Location: [facility];  Service: Pain Management;  Laterality: Right;       Family History:  Family History   Problem Relation Age of Onset    Arthritis Mother     Cirrhosis Father        Social History:  Social History     Socioeconomic History    Marital status:      Spouse name: Not on file    Number of children: Not on file    Years of education: Not on file    Highest education level: Not on file   Occupational History    Not on file   Social Needs    Financial resource strain: Not on file    Food insecurity:     Worry: Not on file     Inability: Not on file    Transportation needs:     Medical: Not on file     Non-medical: Not on file   Tobacco Use    Smoking status: Never Smoker    Smokeless tobacco: Never Used   Substance and Sexual  Activity    Alcohol use: Yes    Drug use: No    Sexual activity: Not on file   Lifestyle    Physical activity:     Days per week: Not on file     Minutes per session: Not on file    Stress: Not on file   Relationships    Social connections:     Talks on phone: Not on file     Gets together: Not on file     Attends Mandaeism service: Not on file     Active member of club or organization: Not on file     Attends meetings of clubs or organizations: Not on file     Relationship status: Not on file   Other Topics Concern    Not on file   Social History Narrative    Not on file       OBJECTIVE:    There were no vitals taken for this visit.    PHYSICAL EXAMINATION:    General appearance: Well appearing, in no acute distress, alert and oriented x3.  Psych:  Mood and affect appropriate.  Skin: Skin color, texture, turgor normal, no rashes or lesions, in both upper and lower body.  Head/face:  Atraumatic, normocephalic. No palpable lymph nodes.  Cor: RRR  Pulm: CTA  GI: Abdomen soft and non-tender.  Back: Negative SLR bilaterally. Negative tenderness along lumbar spinous process or paraspinals. Limited ROM with pain on flexion and extension. Negative facet loading bialterally  Extremities: Peripheral joint ROM is full and pain free without obvious instability or laxity in all four extremities. No deformities, edema, or skin discoloration. Good capillary refill.  Musculoskeletal: Shoulder, hip, sacroiliac and knee provocative maneuvers are negative. Bilateral lower extremity strength is normal and symmetric.  No atrophy or tone abnormalities are noted.  Neuro: Bilateral lower extremity coordination and muscle stretch reflexes are physiologic and symmetric.  Plantar response are downgoing. Normal sensation bilateral lower extremities.  Gait: Normal    ASSESSMENT: 59 y.o. year old male with low back and leg pain, consistent with the followin. Lumbar radiculopathy     2. Chronic pain disorder     3. Lumbar  spondylosis           PLAN:     - I have stressed the importance of physical activity and a home exercise plan to help with pain and improve health.    - Patient can continue with medications for now since they are providing benefits, using them appropriately, and without side effects.    - Schedule for Right L4-5 and L5-S1 TFESI    - Recommend patient try Tylenol BID    - RTC 1 Month     Harlan Stern MD [facility]  I have personally taken the history and examined this patient and agree with the fellow's note as stated above.

## 2020-02-03 ENCOUNTER — HOSPITAL ENCOUNTER (OUTPATIENT)
Facility: OTHER | Age: 60
Discharge: HOME | End: 2020-02-03
Attending: ANESTHESIOLOGY | Admitting: ANESTHESIOLOGY
Payer: COMMERCIAL

## 2020-02-03 VITALS
RESPIRATION RATE: 16 BRPM | OXYGEN SATURATION: 99 % | DIASTOLIC BLOOD PRESSURE: 99 MMHG | HEIGHT: 69 IN | BODY MASS INDEX: 22.22 KG/M2 | WEIGHT: 150 LBS | TEMPERATURE: 99 F | SYSTOLIC BLOOD PRESSURE: 156 MMHG | HEART RATE: 74 BPM

## 2020-02-03 DIAGNOSIS — M54.16 LUMBAR RADICULOPATHY: Primary | ICD-10-CM

## 2020-02-03 DIAGNOSIS — G89.29 CHRONIC PAIN: ICD-10-CM

## 2020-02-03 PROCEDURE — 64484 NJX AA&/STRD TFRM EPI L/S EA: CPT | Mod: RT,,, | Performed by: ANESTHESIOLOGY

## 2020-02-03 PROCEDURE — 64484 PRA INJECT ANES/STEROID FORAMEN LUMBAR/SACRAL W IMG GUIDE ,EA ADD LEVEL: ICD-10-PCS | Mod: RT,,, | Performed by: ANESTHESIOLOGY

## 2020-02-03 PROCEDURE — 64483 NJX AA&/STRD TFRM EPI L/S 1: CPT | Mod: RT,,, | Performed by: ANESTHESIOLOGY

## 2020-02-03 PROCEDURE — 25000003 PHARM REV CODE 250: Performed by: ANESTHESIOLOGY

## 2020-02-03 PROCEDURE — 64484 NJX AA&/STRD TFRM EPI L/S EA: CPT | Performed by: ANESTHESIOLOGY

## 2020-02-03 PROCEDURE — 64483 NJX AA&/STRD TFRM EPI L/S 1: CPT | Performed by: ANESTHESIOLOGY

## 2020-02-03 PROCEDURE — 25500020 PHARM REV CODE 255: Performed by: ANESTHESIOLOGY

## 2020-02-03 PROCEDURE — 63600175 PHARM REV CODE 636 W HCPCS: Performed by: ANESTHESIOLOGY

## 2020-02-03 PROCEDURE — 64483 PR EPIDURAL INJ, ANES/STEROID, TRANSFORAMINAL, LUMB/SACR, SNGL LEVL: ICD-10-PCS | Mod: RT,,, | Performed by: ANESTHESIOLOGY

## 2020-02-03 RX ORDER — LIDOCAINE HYDROCHLORIDE 10 MG/ML
INJECTION, SOLUTION EPIDURAL; INFILTRATION; INTRACAUDAL; PERINEURAL
Status: DISCONTINUED | OUTPATIENT
Start: 2020-02-03 | End: 2020-02-03 | Stop reason: HOSPADM

## 2020-02-03 RX ORDER — LIDOCAINE HYDROCHLORIDE 10 MG/ML
INJECTION INFILTRATION; PERINEURAL
Status: DISCONTINUED | OUTPATIENT
Start: 2020-02-03 | End: 2020-02-03 | Stop reason: HOSPADM

## 2020-02-03 RX ORDER — DEXAMETHASONE SODIUM PHOSPHATE 100 MG/10ML
INJECTION INTRAMUSCULAR; INTRAVENOUS
Status: DISCONTINUED | OUTPATIENT
Start: 2020-02-03 | End: 2020-02-03 | Stop reason: HOSPADM

## 2020-02-03 RX ORDER — SODIUM CHLORIDE 9 MG/ML
500 INJECTION, SOLUTION INTRAVENOUS CONTINUOUS
Status: DISCONTINUED | OUTPATIENT
Start: 2020-02-03 | End: 2020-02-03 | Stop reason: HOSPADM

## 2020-02-03 NOTE — OP NOTE
Discharge Note  Short Stay      SUMMARY     Admit Date: 2/3/2020    Attending Physician: Dr. Pascual Yadav    Discharge Physician: Chaka Brunson DO      Discharge Date: 2/3/2020 12:56 PM    Procedure(s) (LRB):  INJECTION, STEROID, EPIDURAL, TRANSFORAMINAL APPROACH, L4-L5  AND L5-S1 (Right)    Final Diagnosis: Lumbar radiculopathy [M54.16]    Disposition: Home or self care    Patient Instructions:   Discharge Medication List as of 2/3/2020  8:47 AM      CONTINUE these medications which have NOT CHANGED    Details   !! gabapentin (NEURONTIN) 400 MG capsule Take 2 capsules (800 mg total) by mouth 3 (three) times daily., Starting Mon 6/10/2019, Normal      !! gabapentin (NEURONTIN) 400 MG capsule TAKE 1 CAPSULE BY MOUTH IN THE MORNING , 1 CAPSULE IN THE AFTERNOON AND 2 CAPSULES AT BEDTIME, Normal      hydroCHLOROthiazide (HYDRODIURIL) 25 MG tablet Starting Mon 4/2/2018, Historical Med       !! - Potential duplicate medications found. Please discuss with provider.              Discharge Diagnosis: Lumbar radiculopathy [M54.16]  Condition on Discharge: Stable with no complications to procedure   Diet on Discharge: Same as before.  Activity: as per instruction sheet.  Discharge to: Home with a responsible adult.  Follow up: 2-4 weeks       Please call my office or pager at [phone] if experienced any weakness or loss of sensation, fever > 101.5, pain uncontrolled with oral medications, persistent nausea/vomiting/or diarrhea, redness or drainage from the incisions, or any other worrisome concerns. If physician on call was not reached or could not communicate with our office for any reason please go to the nearest emergency department

## 2020-02-03 NOTE — OP NOTE
Date of Service: 02/03/2020    PCP: Nasrin Benz PA-C    Referring Physician:    Time-out taken to identify patient and procedure side prior to starting the procedure.   I attest that I have reviewed the patient's home medications prior to the procedure and no contraindication have been identified. I  re-evaluated the patient after the patient was positioned for the procedure in the procedure room immediately before the procedural time-out. The vital signs are current and represent the current state of the patient which has not significantly changed since the preprocedure assessment.                                                           PROCEDURE: Right L4/5 & L5/S1 transforaminal epidural steroid injection under fluoroscopy    REASON FOR PROCEDURE: Right Lumbar radiculopathy [M54.16]  1. Lumbar radiculopathy    2. Chronic pain      POSTPROCEDURE DIAGNOSIS:   Lumbar radiculopathy [M54.16]    1. Lumbar radiculopathy    2. Chronic pain           PHYSICIAN: Pasucal Yadav MD  ASSISTANTS:Chaka Brunson DO PM&R resident     MEDICATIONS INJECTED:  Preservative-free dexamethasone 10mg, Xylocaine 1% MPF 3-5ml. 3ml per level. Preservative free, sterile normal saline is used to get larger volume as needed.  LOCAL ANESTHETIC INJECTED:  Xylocaine 1% 9ml with Sodium Bicarbonate 1ml. 3ml per site.    SEDATION MEDICATIONS: None  ESTIMATED BLOOD LOSS:  None.    COMPLICATIONS:  None.    TECHNIQUE:   Laying in a prone position, the patient was prepped and draped in the usual sterile fashion using ChloraPrep and fenestrated drape.  The area to be injected was determined under fluoroscopic guidance.  Local anesthetic was given by raising a wheel and going down to the hub of a 27-gauge 1.25 inch needle.  The 3.5inch 22-gauge spinal needle was introduced towards the transverse process of all levels as stated above.   The needle was walked medially then hinged into the neural foramen.  Omnipaque was injected to confirm appropriate  placement and that there was no vascular runoff.  The medication was then injected after applying negative pressure. The patient tolerated the procedure well.    PAIN BEFORE THE PROCEDURE: 5-9/10.    PAIN AFTER THE PROCEDURE: 0/10.    The patient was monitored after the procedure.  Patient was given post procedure and discharge instructions to follow at home.  We will see the patient back in two weeks or the patient may call to inform of status. The patient was discharged in a stable condition.

## 2020-02-03 NOTE — DISCHARGE SUMMARY
Discharge Note  Short Stay      SUMMARY     Admit Date: 2/3/2020    Attending Physician: Pascual Yadav      Discharge Physician: Pascual Yadav      Discharge Date: 2/3/2020 8:18 AM    Procedure(s) (LRB):  INJECTION, STEROID, EPIDURAL, TRANSFORAMINAL APPROACH, L4-L5  AND L5-S1 (Right)    Final Diagnosis: Lumbar radiculopathy [M54.16]    Disposition: Home or self care    Patient Instructions:   Current Discharge Medication List      CONTINUE these medications which have NOT CHANGED    Details   !! gabapentin (NEURONTIN) 400 MG capsule Take 2 capsules (800 mg total) by mouth 3 (three) times daily.  Qty: 180 capsule, Refills: 4    Associated Diagnoses: Lumbar radiculopathy; Osteoarthritis of spine with radiculopathy, lumbar region; Facet arthritis of lumbar region; DDD (degenerative disc disease), lumbar      !! gabapentin (NEURONTIN) 400 MG capsule TAKE 1 CAPSULE BY MOUTH IN THE MORNING , 1 CAPSULE IN THE AFTERNOON AND 2 CAPSULES AT BEDTIME  Qty: 120 capsule, Refills: 4    Comments: Please consider 90 day supplies to promote better adherence  Associated Diagnoses: Lumbar radiculopathy; Osteoarthritis of spine with radiculopathy, lumbar region; Facet arthritis of lumbar region; DDD (degenerative disc disease), lumbar      hydroCHLOROthiazide (HYDRODIURIL) 25 MG tablet        !! - Potential duplicate medications found. Please discuss with provider.              Discharge Diagnosis: Lumbar radiculopathy [M54.16]  Condition on Discharge: Stable with no complications to procedure   Diet on Discharge: Same as before.  Activity: as per instruction sheet.  Discharge to: Home with a responsible adult.  Follow up: 2-4 weeks       Please call my office or pager at [phone] if experienced any weakness or loss of sensation, fever > 101.5, pain uncontrolled with oral medications, persistent nausea/vomiting/or diarrhea, redness or drainage from the incisions, or any other worrisome concerns. If physician on call was not reached or  could not communicate with our office for any reason please go to the nearest emergency department

## 2020-02-03 NOTE — DISCHARGE INSTRUCTIONS

## 2020-02-03 NOTE — INTERVAL H&P NOTE
"HPI  Patient presenting for Procedure(s) (LRB):  INJECTION, STEROID, EPIDURAL, TRANSFORAMINAL APPROACH, L4-L5  AND L5-S1 (Right)     Patient on Anti-coagulation No    No health changes since previous encounter    Past Medical History:   Diagnosis Date    Heart murmur     Hypertension      Past Surgical History:   Procedure Laterality Date    KNEE SURGERY      TRANSFORAMINAL EPIDURAL INJECTION OF STEROID Right 8/1/2019    Procedure: INJECTION, STEROID, EPIDURAL, TRANSFORAMINAL APPROACH;  Surgeon: Pascual Yadav MD;  Location: [facility];  Service: Pain Management;  Laterality: Right;     Review of patient's allergies indicates:   Allergen Reactions    Lipitor [atorvastatin] Swelling      Current Facility-Administered Medications   Medication    0.9%  NaCl infusion     Facility-Administered Medications Ordered in Other Encounters   Medication    0.9%  NaCl infusion       PMHx, PSHx, Allergies, Medications reviewed in epic    ROS negative except pain complaints in HPI    OBJECTIVE:    BP (!) 160/97   Pulse 71   Temp 98.5 °F (36.9 °C) (Oral)   Resp 18   Ht 5' 9" (1.753 m)   Wt 68 kg (150 lb)   SpO2 99%   BMI 22.15 kg/m²     PHYSICAL EXAMINATION:    GENERAL: Well appearing, in no acute distress, alert and oriented x3.  PSYCH:  Mood and affect appropriate.  SKIN: Skin color, texture, turgor normal, no rashes or lesions which will impact the procedure.  CV: RRR with palpation of the radial artery.  PULM: No evidence of respiratory difficulty, symmetric chest rise. Clear to auscultation.  NEURO: Cranial nerves grossly intact.    Plan:    Proceed with procedure as planned Procedure(s) (LRB):  INJECTION, STEROID, EPIDURAL, TRANSFORAMINAL APPROACH, L4/L5  AND L5/S1 (Right)    Chaka Brunson  02/03/2020            "

## 2020-03-02 ENCOUNTER — TELEPHONE (OUTPATIENT)
Dept: PAIN MEDICINE | Facility: CLINIC | Age: 60
End: 2020-03-02

## 2020-03-02 NOTE — TELEPHONE ENCOUNTER
My name is Staff, I am contacting you from [facility] regarding your appointment scheduled for 03-, with MELODY, just confirming you will be able to make it.    If you feel you need to reschedule or canceled please give our office a call so we can better assist you.      Staff requesting patient to arrive 15 mins ahead of schedule appointment time.    Pt verbalized understanding and confirmed upcoming appt.

## 2020-03-03 ENCOUNTER — OFFICE VISIT (OUTPATIENT)
Dept: PAIN MEDICINE | Facility: CLINIC | Age: 60
End: 2020-03-03
Payer: COMMERCIAL

## 2020-03-03 VITALS
BODY MASS INDEX: 22.24 KG/M2 | WEIGHT: 150.13 LBS | OXYGEN SATURATION: 100 % | DIASTOLIC BLOOD PRESSURE: 91 MMHG | TEMPERATURE: 99 F | SYSTOLIC BLOOD PRESSURE: 160 MMHG | HEIGHT: 69 IN | HEART RATE: 87 BPM | RESPIRATION RATE: 18 BRPM

## 2020-03-03 DIAGNOSIS — M47.26 OSTEOARTHRITIS OF SPINE WITH RADICULOPATHY, LUMBAR REGION: ICD-10-CM

## 2020-03-03 DIAGNOSIS — M54.16 LUMBAR RADICULOPATHY: Primary | ICD-10-CM

## 2020-03-03 DIAGNOSIS — G89.4 CHRONIC PAIN DISORDER: ICD-10-CM

## 2020-03-03 DIAGNOSIS — M47.816 LUMBAR SPONDYLOSIS: ICD-10-CM

## 2020-03-03 DIAGNOSIS — M51.36 DDD (DEGENERATIVE DISC DISEASE), LUMBAR: ICD-10-CM

## 2020-03-03 PROCEDURE — 99213 PR OFFICE/OUTPT VISIT, EST, LEVL III, 20-29 MIN: ICD-10-PCS | Mod: S$GLB,,, | Performed by: NURSE PRACTITIONER

## 2020-03-03 PROCEDURE — 99999 PR PBB SHADOW E&M-EST. PATIENT-LVL III: ICD-10-PCS | Mod: PBBFAC,,, | Performed by: NURSE PRACTITIONER

## 2020-03-03 PROCEDURE — 3008F PR BODY MASS INDEX (BMI) DOCUMENTED: ICD-10-PCS | Mod: CPTII,S$GLB,, | Performed by: NURSE PRACTITIONER

## 2020-03-03 PROCEDURE — 3008F BODY MASS INDEX DOCD: CPT | Mod: CPTII,S$GLB,, | Performed by: NURSE PRACTITIONER

## 2020-03-03 PROCEDURE — 99999 PR PBB SHADOW E&M-EST. PATIENT-LVL III: CPT | Mod: PBBFAC,,, | Performed by: NURSE PRACTITIONER

## 2020-03-03 PROCEDURE — 99213 OFFICE O/P EST LOW 20 MIN: CPT | Mod: S$GLB,,, | Performed by: NURSE PRACTITIONER

## 2020-03-03 RX ORDER — DULOXETIN HYDROCHLORIDE 30 MG/1
30 CAPSULE, DELAYED RELEASE ORAL DAILY
Qty: 30 CAPSULE | Refills: 2 | Status: SHIPPED | OUTPATIENT
Start: 2020-03-03 | End: 2020-06-02 | Stop reason: SDUPTHER

## 2020-03-03 NOTE — PROGRESS NOTES
Chronic patient Established Note (Follow up visit)      SUBJECTIVE:    Nacho Liu presents to the clinic for a follow-up appointment for low back and leg pain. He is s/p right L4/5 and L5/S1 TF PAVITHRA on 2/3/2020. He reports no significant relief of his pain. He continues to report low back pain that radiates down the side and back of his right leg to mid calf. He also reports burning pain into the anteromedial aspect of his right thigh. He denies any left leg pain. His pain is worse with prolonged standing, especially with work. He continues to take Gabapentin with limited relief. He denies any other health changes. He denies any bowel or bladder incontinence. His pain today is 10/10.      Pain Disability Index Review:  Last 3 PDI Scores 3/3/2020 1/21/2020 8/20/2019   Pain Disability Index (PDI) 64 0 14       Pain Medications:  Gabapentin 800 mg TID    Tried in the Past:  Lyrica (too expensive)      Opioid Contract: no     report:  Reviewed and consistent with medication use as prescribed.    Pain Procedures:   2 previous ESIs at [facility] in 2018  8/30/2018- Right L4 and S1 TF PAVITHRA   10/29/2018- Right L5 and S1 TF PAVITHRA  12/3/2018- Right L4 and L5 TF PAVITHRA  8/1/2019- Right L4/5 and L5/S1 TF PAVITHRA- significant relief  2/3/2020- Right L4/5 and L5/S1 TF PAVITHRA- no relief    Physical Therapy/Home Exercise: no    Imaging:   MRI Lumbar Spine 5/31/2018:  FINDINGS:  There is straightening of the normal lumbar lordosis.  Heterogeneous T1 bone marrow signal throughout the lumbar spine without focal bone marrow edema.  Overall concerning for  red marrow reconversion clinical correlation for underlying chronic anemia, no evidence for focal infiltrative process.    There is scattered endplate degeneration most prominent at L4/L5 level with superimposed disc desiccation and height loss.    The distal spinal cord and conus is normal in signal and contour tip of the conus approximates the inferior L1 level.    No aneurysmal dilatation of  the visualized abdominal aorta.    T12/L1 through L1/L2: No significant disc bulge, central canal or neural foraminal stenosis.    L2/L3: No significant disc bulge central canal or neural foraminal stenosis.    L3/L4:Small posterior disc osteophyte without significant central canal or neural foraminal stenosis    L4/L5:Posterior disc osteophyte with ligamentum flavum hypertrophy and small annular fissure with mild central canal stenosis and mild neural foraminal narrowing.    L5/S1: Small posterior disc osteophyte with ligamentum flavum hypertrophy without significant central canal stenosis with attenuation of the thecal sac related to epidural lipomatosis.  There is facet joint arthropathy and mild moderate neural foraminal stenosis bilaterally.    This report was flagged in Epic as abnormal.      Impression       Multilevel degenerative change lumbar spine most pronounced at L4/L5 with posterior disc osteophyte with ligamentum flavum hypertrophy and small annular fissure there is mild central canal and neural foraminal stenosis.    There is heterogeneous T1 bone marrow signal throughout the lumbosacral spine most compatible with red marrow reconversion clinical correlation for possible underlying anemia.     EMG 7/5/2018:  Normal study    Allergies:   Review of patient's allergies indicates:   Allergen Reactions    Lipitor [atorvastatin] Swelling       Current Medications:   Current Outpatient Medications   Medication Sig Dispense Refill    gabapentin (NEURONTIN) 400 MG capsule Take 2 capsules (800 mg total) by mouth 3 (three) times daily. 180 capsule 4    gabapentin (NEURONTIN) 400 MG capsule TAKE 1 CAPSULE BY MOUTH IN THE MORNING , 1 CAPSULE IN THE AFTERNOON AND 2 CAPSULES AT BEDTIME 120 capsule 4    hydroCHLOROthiazide (HYDRODIURIL) 25 MG tablet        No current facility-administered medications for this visit.      Facility-Administered Medications Ordered in Other Visits   Medication Dose Route Frequency  Provider Last Rate Last Dose    0.9%  NaCl infusion  500 mL Intravenous Continuous Adryan More MD           REVIEW OF SYSTEMS:    GENERAL:  No weight loss, malaise or fevers.  HEENT:  Negative for frequent or significant headaches.  NECK:  Negative for lumps, goiter, pain and significant neck swelling.  RESPIRATORY:  Negative for cough, wheezing or shortness of breath.  CARDIOVASCULAR:  Negative for chest pain, leg swelling or palpitations. HTN  GI:  Negative for abdominal discomfort, blood in stools or black stools or change in bowel habits.  MUSCULOSKELETAL:  See HPI.  SKIN:  Negative for lesions, rash, and itching.  PSYCH:  Negative for sleep disturbance, mood disorder and recent psychosocial stressors.  HEMATOLOGY/LYMPHOLOGY:  Negative for prolonged bleeding, bruising easily or swollen nodes.  NEURO:   No history of headaches, syncope, paralysis, seizures or tremors.  All other reviewed and negative other than HPI.    Past Medical History:  Past Medical History:   Diagnosis Date    Heart murmur     Hypertension        Past Surgical History:  Past Surgical History:   Procedure Laterality Date    KNEE SURGERY      TRANSFORAMINAL EPIDURAL INJECTION OF STEROID Right 8/1/2019    Procedure: INJECTION, STEROID, EPIDURAL, TRANSFORAMINAL APPROACH;  Surgeon: Pascual Yadav MD;  Location: [facility]IN [facility];  Service: Pain Management;  Laterality: Right;    TRANSFORAMINAL EPIDURAL INJECTION OF STEROID Right 2/3/2020    Procedure: INJECTION, STEROID, EPIDURAL, TRANSFORAMINAL APPROACH, L4-L5  AND L5-S1;  Surgeon: Pascual Yadav MD;  Location: [facility] PAIN [facility];  Service: Pain Management;  Laterality: Right;       Family History:  Family History   Problem Relation Age of Onset    Arthritis Mother     Cirrhosis Father        Social History:  Social History     Socioeconomic History    Marital status:      Spouse name: Not on file    Number of children: Not on file    Years of education: Not on file    Highest  "education level: Not on file   Occupational History    Not on file   Social Needs    Financial resource strain: Not on file    Food insecurity:     Worry: Not on file     Inability: Not on file    Transportation needs:     Medical: Not on file     Non-medical: Not on file   Tobacco Use    Smoking status: Never Smoker    Smokeless tobacco: Never Used   Substance and Sexual Activity    Alcohol use: Yes    Drug use: No    Sexual activity: Not on file   Lifestyle    Physical activity:     Days per week: Not on file     Minutes per session: Not on file    Stress: Not on file   Relationships    Social connections:     Talks on phone: Not on file     Gets together: Not on file     Attends Yazidi service: Not on file     Active member of club or organization: Not on file     Attends meetings of clubs or organizations: Not on file     Relationship status: Not on file   Other Topics Concern    Not on file   Social History Narrative    Not on file       OBJECTIVE:    BP (!) 160/91   Pulse 87   Temp 98.7 °F (37.1 °C)   Resp 18   Ht 5' 9" (1.753 m)   Wt 68.1 kg (150 lb 2.1 oz)   SpO2 100%   BMI 22.17 kg/m²     PHYSICAL EXAMINATION:    General appearance: Well appearing, in no acute distress, alert and oriented x3.  Psych:  Mood and affect appropriate.  Skin: Skin color, texture, turgor normal, no rashes or lesions, in both upper and lower body.  Head/face:  Atraumatic, normocephalic. No palpable lymph nodes.  Cor: RRR  Pulm: CTA  GI: Abdomen soft and non-tender.  Back: Straight leg raising in the sitting position is negative bilaterally. There is pain to palpation over the lumbar spine. Limited ROM with pain on flexion and extension. Negative facet loading on the right.   Extremities: Peripheral joint ROM is full and pain free without obvious instability or laxity in all four extremities. No deformities, edema, or skin discoloration. Good capillary refill.  Musculoskeletal: Shoulder, hip, sacroiliac and " knee provocative maneuvers are negative. Bilateral lower extremity strength is normal and symmetric.  No atrophy or tone abnormalities are noted.  Neuro: Bilateral lower extremity coordination and muscle stretch reflexes are physiologic and symmetric.  Plantar response are downgoing. Decreased sensation to right calf and foot.   Gait: Antalgic- ambulates without assistance.     ASSESSMENT: 59 y.o. year old male with low back and leg pain, consistent with the followin. Lumbar radiculopathy  DULoxetine (CYMBALTA) 30 MG capsule   2. Osteoarthritis of spine with radiculopathy, lumbar region  DULoxetine (CYMBALTA) 30 MG capsule   3. Lumbar spondylosis  DULoxetine (CYMBALTA) 30 MG capsule   4. DDD (degenerative disc disease), lumbar  DULoxetine (CYMBALTA) 30 MG capsule   5. Chronic pain disorder  DULoxetine (CYMBALTA) 30 MG capsule         PLAN:     - Previous imaging was reviewed and discussed with the patient today.    - Consider L3/4 and L4/5 TF PAVITHRA in the future. He was not interested today.     - Consider lumbar medial branch blocks.     - Continue Gabapentin 800 mg TID.     - Trial Cymbalta 30 mg daily.     - I have stressed the importance of physical activity and a home exercise plan to help with pain and improve health.    - RTC in 1 month.       - Counseled patient regarding the importance of activity modification and constant sleeping habits.    - Dr. Yadav was consulted on the patient and agrees with this plan.    The above plan and management options were discussed at length with patient. Patient is in agreement with the above and verbalized understanding.    Ella Herrera NP  2020

## 2020-03-25 ENCOUNTER — TELEPHONE (OUTPATIENT)
Dept: PAIN MEDICINE | Facility: CLINIC | Age: 60
End: 2020-03-25

## 2020-03-25 NOTE — TELEPHONE ENCOUNTER
Spoke with patient to inform not to come into the clinic for 04/032/2020 with the nurse practitioner, patient scheduled for phone visit with Dr Yadav on 04/06/2020, patient verbalized an understanding

## 2020-04-02 ENCOUNTER — TELEPHONE (OUTPATIENT)
Dept: PAIN MEDICINE | Facility: CLINIC | Age: 60
End: 2020-04-02

## 2020-04-02 NOTE — TELEPHONE ENCOUNTER
Staff contacted and spoke with patient in regards to his appt schedule for 4/6/20 with provider Dr. Yadav     Staff informed that his visit would be a virtual visit with provider(Patient stated to staff that he does not have a smart phone and want to keep his telephone call). Staff informed pt they will make no changes     Pt verbalized understanding.

## 2020-04-06 ENCOUNTER — TELEPHONE (OUTPATIENT)
Dept: PAIN MEDICINE | Facility: CLINIC | Age: 60
End: 2020-04-06

## 2020-04-06 DIAGNOSIS — M47.816 FACET ARTHRITIS OF LUMBAR REGION: ICD-10-CM

## 2020-04-06 DIAGNOSIS — M51.36 DDD (DEGENERATIVE DISC DISEASE), LUMBAR: ICD-10-CM

## 2020-04-06 DIAGNOSIS — M47.26 OSTEOARTHRITIS OF SPINE WITH RADICULOPATHY, LUMBAR REGION: ICD-10-CM

## 2020-04-06 DIAGNOSIS — M54.16 LUMBAR RADICULOPATHY: ICD-10-CM

## 2020-04-06 RX ORDER — GABAPENTIN 400 MG/1
800 CAPSULE ORAL 3 TIMES DAILY
Qty: 180 CAPSULE | Refills: 1 | Status: SHIPPED | OUTPATIENT
Start: 2020-04-06 | End: 2020-05-06

## 2020-04-06 NOTE — TELEPHONE ENCOUNTER
Pain Medicine Clinic Telephone Note:     **This encounter was carried out via telephone to avoid COVID-19 exposure. Patient opted for a telephone call per his/her preference. This telephone encounter is not associated to an office visit.**      Contacted Mr. Liu at home for follow-up of his chronic low back pain. He states that since his last appointment, his pain has significantly improved. He continues to endorse intermittent right-sided low back pain with occasional radiation down his right leg to his right calf. He was started on trial of Cymbalta last month which he states has provided significant relief of his overall pain. In addition, he continues ot take gabapentin with added relief. He has also participated in home exercises over this time.     Plan:  - Discussed potential to repeat TF PAVITHRA in future pending overall course of his pain symptoms.    - Continue Cymbalta 30 mg qday.    - Continue gabapentin 800 mg TID. Refill provided today.    - Encouraged continue home exercise for core strengthening, stretching, and overall improved function.     - Follow-up in 6-8 weeks.    Pablo Diallo MD  [facility] Fellow, PGY V    This was discussed with the fellow. I agree with the above

## 2020-05-12 ENCOUNTER — LAB VISIT (OUTPATIENT)
Dept: LAB | Facility: OTHER | Age: 60
End: 2020-05-12
Attending: SPECIALIST
Payer: COMMERCIAL

## 2020-05-12 ENCOUNTER — CLINICAL SUPPORT (OUTPATIENT)
Dept: OTOLARYNGOLOGY | Facility: CLINIC | Age: 60
End: 2020-05-12
Payer: COMMERCIAL

## 2020-05-12 ENCOUNTER — OFFICE VISIT (OUTPATIENT)
Dept: OTOLARYNGOLOGY | Facility: CLINIC | Age: 60
End: 2020-05-12
Payer: COMMERCIAL

## 2020-05-12 VITALS
WEIGHT: 152.38 LBS | BODY MASS INDEX: 22.57 KG/M2 | SYSTOLIC BLOOD PRESSURE: 146 MMHG | TEMPERATURE: 99 F | HEIGHT: 69 IN | HEART RATE: 90 BPM | DIASTOLIC BLOOD PRESSURE: 90 MMHG

## 2020-05-12 DIAGNOSIS — Z13.9 SCREENING FOR CONDITION: ICD-10-CM

## 2020-05-12 DIAGNOSIS — J34.2 NASAL SEPTAL DEVIATION: ICD-10-CM

## 2020-05-12 DIAGNOSIS — H93.13 TINNITUS OF BOTH EARS: ICD-10-CM

## 2020-05-12 DIAGNOSIS — R42 DIZZINESS: ICD-10-CM

## 2020-05-12 DIAGNOSIS — H93.11 TINNITUS OF RIGHT EAR: ICD-10-CM

## 2020-05-12 DIAGNOSIS — H90.A22 SENSORINEURAL HEARING LOSS (SNHL) OF LEFT EAR WITH RESTRICTED HEARING OF RIGHT EAR: Primary | ICD-10-CM

## 2020-05-12 DIAGNOSIS — H90.3 SENSORINEURAL HEARING LOSS (SNHL) OF BOTH EARS: Primary | ICD-10-CM

## 2020-05-12 LAB
BUN SERPL-MCNC: 14 MG/DL (ref 8–23)
CREAT SERPL-MCNC: 0.8 MG/DL (ref 0.5–1.4)
EST. GFR  (AFRICAN AMERICAN): >60 ML/MIN/1.73 M^2
EST. GFR  (NON AFRICAN AMERICAN): >60 ML/MIN/1.73 M^2

## 2020-05-12 PROCEDURE — 3008F PR BODY MASS INDEX (BMI) DOCUMENTED: ICD-10-PCS | Mod: CPTII,S$GLB,, | Performed by: SPECIALIST

## 2020-05-12 PROCEDURE — 3008F BODY MASS INDEX DOCD: CPT | Mod: CPTII,S$GLB,, | Performed by: SPECIALIST

## 2020-05-12 PROCEDURE — 92557 PR COMPREHENSIVE HEARING TEST: ICD-10-PCS | Mod: S$GLB,,, | Performed by: AUDIOLOGIST

## 2020-05-12 PROCEDURE — 92567 PR TYMPA2METRY: ICD-10-PCS | Mod: S$GLB,,, | Performed by: AUDIOLOGIST

## 2020-05-12 PROCEDURE — 92557 COMPREHENSIVE HEARING TEST: CPT | Mod: S$GLB,,, | Performed by: AUDIOLOGIST

## 2020-05-12 PROCEDURE — 99204 OFFICE O/P NEW MOD 45 MIN: CPT | Mod: S$GLB,,, | Performed by: SPECIALIST

## 2020-05-12 PROCEDURE — 99204 PR OFFICE/OUTPT VISIT, NEW, LEVL IV, 45-59 MIN: ICD-10-PCS | Mod: S$GLB,,, | Performed by: SPECIALIST

## 2020-05-12 PROCEDURE — 84520 ASSAY OF UREA NITROGEN: CPT

## 2020-05-12 PROCEDURE — 82565 ASSAY OF CREATININE: CPT

## 2020-05-12 PROCEDURE — 36415 COLL VENOUS BLD VENIPUNCTURE: CPT

## 2020-05-12 PROCEDURE — 92567 TYMPANOMETRY: CPT | Mod: S$GLB,,, | Performed by: AUDIOLOGIST

## 2020-05-12 NOTE — PROGRESS NOTES
Audiologic Evaluation    Nacho Liu was seen on the above date for a hearing evaluation. Nacho Liu reports decreased hearing sensitivity and persistent tinnitus in the right ear only. Nacho Liu also reports loud noise exposure (music) at work. He denies difficulty hearing or tinnitus in the left ear.     Tympanometry indicated normal middle ear pressure, tympanic membrane compliance and ear canal volume (type A tympanogram) in each ear.     Audiometric testing via insert ear phones indicated borderline hearing loss for 250-2000 Hz with a moderately-severe sensorineural hearing loss for 2419-4213 Hz in the left ear and a moderately-severe to profound sensorineural hearing loss for 250-8000 Hz in the right ear. Pure tone average and speech recognition threshold were in good agreement. Excellent speech discrimination ability was demonstrated in quiet when novel words were presented at a conversational level in the left ear. Very poor speech discrimination ability was demonstrated in quiet when novel words were presented at an amplified level in the right ear.      Recommendations:  1) Otologic consultation.  2) Annual audiometric testing to monitor hearing sensitivity.  3) Hearing loss consultation pending medical clearance.  4) Hearing protection in the presence of excessive noise.

## 2020-05-12 NOTE — LETTER
May 12, 2020      Samantha Donnelly NP  [address] [facility] [place] [address]           [facility]  [address] [place]  [facility] [address]  Phone: [phone]  Fax: [phone]          Patient: Nacho Liu   MR Number: [id number]   YOB: 1960   Date of Visit: 5/12/2020       Dear Samantha Donnelly:    Thank you for referring Nacho Liu to me for evaluation. Attached you will find relevant portions of my assessment and plan of care.    If you have questions, please do not hesitate to call me. I look forward to following Nacho Liu along with you.    Sincerely,    CONSTANTINO Vazquez MD    Enclosure  CC:  No Recipients    If you would like to receive this communication electronically, please contact [email or web address] or [phone] to request more information on [facility] access.    For providers and/or their staff who would like to refer a patient to [facility], please contact us through our one-stop-shop provider referral line, [facility] [place], at [phone].    If you feel you have received this communication in error or would no longer like to receive these types of communications, please e-mail [email or web address]

## 2020-05-12 NOTE — PROGRESS NOTES
Subjective:       Patient ID: Nacho iLu is a 59 y.o. male.    Chief Complaint: Ringing in right Ear    The patient has developed bilateral tinnitus which is significantly worse on the right that is characterized as high-pitched and present all day every day.  His hearing is decreased on the right.  He did have both mom's and measles as a child.  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  He is not having vertigo.  His symptoms have become noticeable primarily over the last few months.    Review of Systems   Constitutional: Negative for activity change, appetite change, chills, fatigue and fever.   HENT: Positive for hearing loss and tinnitus. Negative for congestion, drooling, ear discharge, ear pain, mouth sores, postnasal drip, rhinorrhea, sinus pressure, sinus pain, sneezing, sore throat, trouble swallowing and voice change.    Eyes: Negative for photophobia, pain, discharge, redness, itching and visual disturbance.   Respiratory: Negative for apnea, cough, choking, shortness of breath and wheezing.    Cardiovascular: Negative for chest pain and palpitations.   Gastrointestinal: Negative for abdominal distention, abdominal pain, nausea and vomiting.   Musculoskeletal: Positive for back pain. Negative for arthralgias, myalgias, neck pain and neck stiffness.   Skin: Negative.    Allergic/Immunologic: Negative for environmental allergies, food allergies and immunocompromised state.   Neurological: Negative for dizziness, facial asymmetry, speech difficulty, weakness, light-headedness, numbness and headaches.   Hematological: Negative for adenopathy. Does not bruise/bleed easily.   Psychiatric/Behavioral: Negative for agitation, confusion, decreased concentration and sleep disturbance.       Objective:      Physical Exam   Constitutional: He is oriented to person, place, and time. He appears well-developed and  well-nourished.   HENT:   Head: Normocephalic.   Right Ear: Hearing, tympanic membrane, external ear and ear canal normal.   Left Ear: Hearing, tympanic membrane, external ear and ear canal normal.   Nose: Septal deviation present. No mucosal edema or rhinorrhea.   Mouth/Throat: Uvula is midline, oropharynx is clear and moist and mucous membranes are normal. Abnormal dentition. No oropharyngeal exudate.   Eyes: Pupils are equal, round, and reactive to light. Conjunctivae, EOM and lids are normal. Right eye exhibits no discharge. Left eye exhibits no discharge. Right conjunctiva is not injected. Left conjunctiva is not injected.   Neck: Trachea normal, normal range of motion and full passive range of motion without pain. Neck supple. No tracheal deviation present. No thyroid mass and no thyromegaly present.   Cardiovascular: Normal rate, regular rhythm, normal heart sounds and normal pulses. Exam reveals no gallop.   No murmur heard.  Pulmonary/Chest: Effort normal and breath sounds normal. He has no decreased breath sounds. He has no wheezes. He has no rhonchi. He has no rales.   Abdominal: Soft. Bowel sounds are normal. There is no tenderness.   Musculoskeletal: Normal range of motion.        Right shoulder: Normal.   Lymphadenopathy:        Head (right side): No submental, no submandibular and no occipital adenopathy present.        Head (left side): No submental, no submandibular and no occipital adenopathy present.     He has no cervical adenopathy.   Neurological: He is alert and oriented to person, place, and time. He has normal strength. No cranial nerve deficit or sensory deficit. Gait normal.   Skin: Skin is warm and dry. No rash noted. No cyanosis. Nails show no clubbing.   Psychiatric: He has a normal mood and affect. His speech is normal and behavior is normal. Cognition and memory are normal.   Vitals reviewed.           I personally reviewed the results of the audiogram and discuss them in detail with  the patient at the end of the visit.      Assessment:       1. Sensorineural hearing loss (SNHL) of left ear with restricted hearing of right ear    2. Tinnitus of both ears    3. Nasal septal deviation    4. Screening for condition    5. Dizziness        Plan:       I will schedule patient for an MRI of the internal auditory canals with and without IV contrast.  He will need to have right BUN and creatinine drawn prior to the test.  I will recheck him when I have those results.

## 2020-06-01 ENCOUNTER — TELEPHONE (OUTPATIENT)
Dept: PAIN MEDICINE | Facility: CLINIC | Age: 60
End: 2020-06-01

## 2020-06-02 ENCOUNTER — OFFICE VISIT (OUTPATIENT)
Dept: PAIN MEDICINE | Facility: CLINIC | Age: 60
End: 2020-06-02
Attending: ANESTHESIOLOGY
Payer: COMMERCIAL

## 2020-06-02 DIAGNOSIS — M47.26 OSTEOARTHRITIS OF SPINE WITH RADICULOPATHY, LUMBAR REGION: ICD-10-CM

## 2020-06-02 DIAGNOSIS — M51.36 DDD (DEGENERATIVE DISC DISEASE), LUMBAR: ICD-10-CM

## 2020-06-02 DIAGNOSIS — M54.16 LUMBAR RADICULOPATHY: ICD-10-CM

## 2020-06-02 DIAGNOSIS — M47.816 FACET ARTHRITIS OF LUMBAR REGION: ICD-10-CM

## 2020-06-02 DIAGNOSIS — M47.816 LUMBAR SPONDYLOSIS: ICD-10-CM

## 2020-06-02 DIAGNOSIS — G89.4 CHRONIC PAIN DISORDER: ICD-10-CM

## 2020-06-02 PROCEDURE — 99213 OFFICE O/P EST LOW 20 MIN: CPT | Mod: 95,,, | Performed by: NURSE PRACTITIONER

## 2020-06-02 PROCEDURE — 99213 PR OFFICE/OUTPT VISIT, EST, LEVL III, 20-29 MIN: ICD-10-PCS | Mod: 95,,, | Performed by: NURSE PRACTITIONER

## 2020-06-02 RX ORDER — DULOXETIN HYDROCHLORIDE 30 MG/1
30 CAPSULE, DELAYED RELEASE ORAL DAILY
Qty: 30 CAPSULE | Refills: 2 | Status: SHIPPED | OUTPATIENT
Start: 2020-06-02 | End: 2020-08-18 | Stop reason: SDUPTHER

## 2020-06-02 RX ORDER — GABAPENTIN 400 MG/1
CAPSULE ORAL
Qty: 120 CAPSULE | Refills: 4 | Status: SHIPPED | OUTPATIENT
Start: 2020-06-02 | End: 2020-08-18 | Stop reason: SDUPTHER

## 2020-06-02 NOTE — PROGRESS NOTES
Chronic Pain-Tele-Medicine-Established Note (Follow up visit)        The patient location is: Home  The chief complaint leading to consultation is: pain  Visit type: Virtual visit with synchronous audio for 12 minutes  Chronic patient Established Note (Follow up visit)      SUBJECTIVE:    Interval History (6/2/2020):  The patient presents for follow-up of lower back pain.  Patient has associated radiculopathy down right leg posterior down into heel.  Patient has had no significant relief from prior epidural.  He has no recent imaging.  Patient started Cymbalta 30 mg q.h.s. and is taking gabapentin 400 mg q.a.m. and during day as well as 2 at bedtime.  He states pain is not focally worse in morning, it is worse with movement and standing.  He does not endorse relief with forward flexion.  He states stretching to contralateral side alleviates pain minimal.  He denies loss of bowel, bladder, denies saddle paresthesias.  He rates his pain 7/10 today.      Interval History(4/6/2020):  Contacted Mr. Liu at home for follow-up of his chronic low back pain. He states that since his last appointment, his pain has significantly improved. He continues to endorse intermittent right-sided low back pain with occasional radiation down his right leg to his right calf. He was started on trial of Cymbalta last month which he states has provided significant relief of his overall pain. In addition, he continues ot take gabapentin with added relief. He has also participated in home exercises over this time.     Interval History (3/3/2020):  Nacho Liu presents to the clinic for a follow-up appointment for low back and leg pain. He is s/p right L4/5 and L5/S1 TF PAVITHRA on 2/3/2020. He reports no significant relief of his pain. He continues to report low back pain that radiates down the side and back of his right leg to mid calf. He also reports burning pain into the anteromedial aspect of his right thigh. He denies any left leg  pain. His pain is worse with prolonged standing, especially with work. He continues to take Gabapentin with limited relief. He denies any other health changes. He denies any bowel or bladder incontinence. His pain today is 10/10.      Pain Disability Index Review:  Last 3 PDI Scores 3/3/2020 1/21/2020 8/20/2019   Pain Disability Index (PDI) 64 0 14       Pain Medications:  Gabapentin 800 mg TID    Tried in the Past:  Lyrica (too expensive)      Opioid Contract: no     report:  Reviewed and consistent with medication use as prescribed.    Pain Procedures:   2 previous ESIs at [facility] in 2018  8/30/2018- Right L4 and S1 TF PAVITHRA   10/29/2018- Right L5 and S1 TF PAVITHRA  12/3/2018- Right L4 and L5 TF PAVITHRA  8/1/2019- Right L4/5 and L5/S1 TF PAVITHRA- significant relief  2/3/2020- Right L4/5 and L5/S1 TF PAVITHRA- no relief    Physical Therapy/Home Exercise: no    Imaging:   MRI Lumbar Spine 5/31/2018:  FINDINGS:  There is straightening of the normal lumbar lordosis.  Heterogeneous T1 bone marrow signal throughout the lumbar spine without focal bone marrow edema.  Overall concerning for  red marrow reconversion clinical correlation for underlying chronic anemia, no evidence for focal infiltrative process.    There is scattered endplate degeneration most prominent at L4/L5 level with superimposed disc desiccation and height loss.    The distal spinal cord and conus is normal in signal and contour tip of the conus approximates the inferior L1 level.    No aneurysmal dilatation of the visualized abdominal aorta.    T12/L1 through L1/L2: No significant disc bulge, central canal or neural foraminal stenosis.    L2/L3: No significant disc bulge central canal or neural foraminal stenosis.    L3/L4:Small posterior disc osteophyte without significant central canal or neural foraminal stenosis    L4/L5:Posterior disc osteophyte with ligamentum flavum hypertrophy and small annular fissure with mild central canal stenosis and mild neural foraminal  narrowing.    L5/S1: Small posterior disc osteophyte with ligamentum flavum hypertrophy without significant central canal stenosis with attenuation of the thecal sac related to epidural lipomatosis.  There is facet joint arthropathy and mild moderate neural foraminal stenosis bilaterally.    This report was flagged in Epic as abnormal.      Impression       Multilevel degenerative change lumbar spine most pronounced at L4/L5 with posterior disc osteophyte with ligamentum flavum hypertrophy and small annular fissure there is mild central canal and neural foraminal stenosis.    There is heterogeneous T1 bone marrow signal throughout the lumbosacral spine most compatible with red marrow reconversion clinical correlation for possible underlying anemia.     EMG 7/5/2018:  Normal study    Allergies:   Review of patient's allergies indicates:   Allergen Reactions    Lipitor [atorvastatin] Swelling       Current Medications:   Current Outpatient Medications   Medication Sig Dispense Refill    DULoxetine (CYMBALTA) 30 MG capsule Take 1 capsule (30 mg total) by mouth once daily. 30 capsule 2    gabapentin (NEURONTIN) 400 MG capsule TAKE 1 CAPSULE BY MOUTH IN THE MORNING , 1 CAPSULE IN THE AFTERNOON AND 2 CAPSULES AT BEDTIME 120 capsule 4    hydroCHLOROthiazide (HYDRODIURIL) 25 MG tablet        No current facility-administered medications for this visit.      Facility-Administered Medications Ordered in Other Visits   Medication Dose Route Frequency Provider Last Rate Last Dose    0.9%  NaCl infusion  500 mL Intravenous Continuous Adryan More MD           REVIEW OF SYSTEMS:    GENERAL:  No weight loss, malaise or fevers.  HEENT:  Negative for frequent or significant headaches.  NECK:  Negative for lumps, goiter, pain and significant neck swelling.  RESPIRATORY:  Negative for cough, wheezing or shortness of breath.  CARDIOVASCULAR:  Negative for chest pain, leg swelling or palpitations. HTN  GI:  Negative for abdominal  discomfort, blood in stools or black stools or change in bowel habits.  MUSCULOSKELETAL:  See HPI.  SKIN:  Negative for lesions, rash, and itching.  PSYCH:  Negative for sleep disturbance, mood disorder and recent psychosocial stressors.  HEMATOLOGY/LYMPHOLOGY:  Negative for prolonged bleeding, bruising easily or swollen nodes.  NEURO:   No history of headaches, syncope, paralysis, seizures or tremors.  All other reviewed and negative other than HPI.    Past Medical History:  Past Medical History:   Diagnosis Date    Heart murmur     Hypertension        Past Surgical History:  Past Surgical History:   Procedure Laterality Date    KNEE SURGERY      TRANSFORAMINAL EPIDURAL INJECTION OF STEROID Right 8/1/2019    Procedure: INJECTION, STEROID, EPIDURAL, TRANSFORAMINAL APPROACH;  Surgeon: Pascual Yadav MD;  Location: [facility]IN [facility];  Service: Pain Management;  Laterality: Right;    TRANSFORAMINAL EPIDURAL INJECTION OF STEROID Right 2/3/2020    Procedure: INJECTION, STEROID, EPIDURAL, TRANSFORAMINAL APPROACH, L4-L5  AND L5-S1;  Surgeon: Pascual Yadav MD;  Location: [facility]IN [facility];  Service: Pain Management;  Laterality: Right;       Family History:  Family History   Problem Relation Age of Onset    Arthritis Mother     Cirrhosis Father        Social History:  Social History     Socioeconomic History    Marital status:      Spouse name: Not on file    Number of children: Not on file    Years of education: Not on file    Highest education level: Not on file   Occupational History    Not on file   Social Needs    Financial resource strain: Not on file    Food insecurity:     Worry: Not on file     Inability: Not on file    Transportation needs:     Medical: Not on file     Non-medical: Not on file   Tobacco Use    Smoking status: Never Smoker    Smokeless tobacco: Never Used   Substance and Sexual Activity    Alcohol use: Yes    Drug use: No    Sexual activity: Not on file   Lifestyle    Physical  activity:     Days per week: Not on file     Minutes per session: Not on file    Stress: Not on file   Relationships    Social connections:     Talks on phone: Not on file     Gets together: Not on file     Attends Anabaptist service: Not on file     Active member of club or organization: Not on file     Attends meetings of clubs or organizations: Not on file     Relationship status: Not on file   Other Topics Concern    Not on file   Social History Narrative    Not on file       OBJECTIVE:    There were no vitals taken for this visit.    Previous Exam  PHYSICAL EXAMINATION:    General appearance: Well appearing, in no acute distress, alert and oriented x3.  Psych:  Mood and affect appropriate.  Skin: Skin color, texture, turgor normal, no rashes or lesions, in both upper and lower body.  Head/face:  Atraumatic, normocephalic. No palpable lymph nodes.  Cor: RRR  Pulm: CTA  GI: Abdomen soft and non-tender.  Back: Straight leg raising in the sitting position is negative bilaterally. There is pain to palpation over the lumbar spine. Limited ROM with pain on flexion and extension. Negative facet loading on the right.   Extremities: Peripheral joint ROM is full and pain free without obvious instability or laxity in all four extremities. No deformities, edema, or skin discoloration. Good capillary refill.  Musculoskeletal: Shoulder, hip, sacroiliac and knee provocative maneuvers are negative. Bilateral lower extremity strength is normal and symmetric.  No atrophy or tone abnormalities are noted.  Neuro: Bilateral lower extremity coordination and muscle stretch reflexes are physiologic and symmetric.  Plantar response are downgoing. Decreased sensation to right calf and foot.   Gait: Antalgic- ambulates without assistance.     ASSESSMENT: 59 y.o. year old male with low back and leg pain, consistent with the followin. Lumbar radiculopathy  DULoxetine (CYMBALTA) 30 MG capsule    gabapentin (NEURONTIN) 400 MG  capsule   2. Osteoarthritis of spine with radiculopathy, lumbar region  DULoxetine (CYMBALTA) 30 MG capsule    gabapentin (NEURONTIN) 400 MG capsule   3. Lumbar spondylosis  DULoxetine (CYMBALTA) 30 MG capsule   4. DDD (degenerative disc disease), lumbar  DULoxetine (CYMBALTA) 30 MG capsule    gabapentin (NEURONTIN) 400 MG capsule   5. Chronic pain disorder  DULoxetine (CYMBALTA) 30 MG capsule   6. Facet arthritis of lumbar region  gabapentin (NEURONTIN) 400 MG capsule         PLAN:     - Previous imaging was reviewed and discussed with the patient today.  - He has had no significant relief from recent ESIs  - Ordered Updated MRI and discussed importance of getting updated images as pain is unchanged from ESIs.   - Consider NS referral   - Refilled Gabapentin 800 mg TID.   - Refill Cymbalta 30 mg daily.   - Patient can continue with medications for now since they are providing benefits, using them appropriately, and without side effects.  - Counseled patient regarding the importance of constant sleeping habits.  - RTC in 1 month.     The above plan and management options were discussed at length with patient. Patient is in agreement with the above and verbalized understanding.    Ari Dickerson NP  06/02/2020

## 2020-06-09 ENCOUNTER — TELEPHONE (OUTPATIENT)
Dept: ADMINISTRATIVE | Facility: OTHER | Age: 60
End: 2020-06-09

## 2020-06-23 ENCOUNTER — HOSPITAL ENCOUNTER (OUTPATIENT)
Dept: PREADMISSION TESTING | Facility: OTHER | Age: 60
Discharge: HOME | End: 2020-06-23
Attending: ANESTHESIOLOGY
Payer: COMMERCIAL

## 2020-06-23 DIAGNOSIS — Z01.818 PRE-OP TESTING: ICD-10-CM

## 2020-06-23 DIAGNOSIS — Z01.818 PRE-OP TESTING: Primary | ICD-10-CM

## 2020-06-23 PROCEDURE — U0003 INFECTIOUS AGENT DETECTION BY NUCLEIC ACID (DNA OR RNA); SEVERE ACUTE RESPIRATORY SYNDROME CORONAVIRUS 2 (SARS-COV-2) (CORONAVIRUS DISEASE [COVID-19]), AMPLIFIED PROBE TECHNIQUE, MAKING USE OF HIGH THROUGHPUT TECHNOLOGIES AS DESCRIBED BY CMS-2020-01-R: HCPCS

## 2020-06-24 LAB — SARS-COV-2 RNA RESP QL NAA+PROBE: NOT DETECTED

## 2020-06-25 DIAGNOSIS — Z01.812 PRE-PROCEDURE LAB EXAM: Primary | ICD-10-CM

## 2020-07-07 ENCOUNTER — HOSPITAL ENCOUNTER (OUTPATIENT)
Dept: PREADMISSION TESTING | Facility: OTHER | Age: 60
Discharge: HOME | End: 2020-07-07
Attending: ANESTHESIOLOGY
Payer: COMMERCIAL

## 2020-07-07 DIAGNOSIS — M54.16 LUMBAR RADICULOPATHY: ICD-10-CM

## 2020-07-07 DIAGNOSIS — Z01.812 PRE-PROCEDURE LAB EXAM: ICD-10-CM

## 2020-07-07 DIAGNOSIS — Z01.812 PRE-PROCEDURE LAB EXAM: Primary | ICD-10-CM

## 2020-07-07 PROCEDURE — U0003 INFECTIOUS AGENT DETECTION BY NUCLEIC ACID (DNA OR RNA); SEVERE ACUTE RESPIRATORY SYNDROME CORONAVIRUS 2 (SARS-COV-2) (CORONAVIRUS DISEASE [COVID-19]), AMPLIFIED PROBE TECHNIQUE, MAKING USE OF HIGH THROUGHPUT TECHNOLOGIES AS DESCRIBED BY CMS-2020-01-R: HCPCS

## 2020-07-08 LAB — SARS-COV-2 RNA RESP QL NAA+PROBE: NOT DETECTED

## 2020-07-09 ENCOUNTER — TELEPHONE (OUTPATIENT)
Dept: PAIN MEDICINE | Facility: CLINIC | Age: 60
End: 2020-07-09

## 2020-07-09 NOTE — TELEPHONE ENCOUNTER
----- Message from Janeen Almanzar sent at 7/9/2020 11:48 AM CDT -----  Regarding: Call Back  Name of Who is Calling : WILIAN RODRÍGUEZ [[id number]]    Patient is requesting a call from staff in regards to his procedure time for today  .....Please contact to further discuss and advise.    Can the clinic reply by [facility] : No    What Number to Call Back :  [phone]

## 2020-07-09 NOTE — TELEPHONE ENCOUNTER
Staff try to contact patient in regards to his message about the time he should arrive for his procedure today with Pain Provider Dr. Yadav.    Patient did not answer nor could staff leave a voicemail due to patient mailbox being full.

## 2020-07-16 ENCOUNTER — TELEPHONE (OUTPATIENT)
Dept: PAIN MEDICINE | Facility: CLINIC | Age: 60
End: 2020-07-16

## 2020-07-16 NOTE — TELEPHONE ENCOUNTER
----- Message from Genet Louis sent at 7/16/2020 10:21 AM CDT -----  Name of Who is Calling: WILIAN RODRÍGUEZ [[id number]]      What is the request in detail: Pt is calling to speak to staff in regards to rescheduling a procedure.... Please call to further assist .       Can the clinic reply by [facility]: N      What Number to Call Back if not in [facility]: [phone]

## 2020-07-17 ENCOUNTER — TELEPHONE (OUTPATIENT)
Dept: PAIN MEDICINE | Facility: CLINIC | Age: 60
End: 2020-07-17

## 2020-07-22 ENCOUNTER — TELEPHONE (OUTPATIENT)
Dept: PAIN MEDICINE | Facility: CLINIC | Age: 60
End: 2020-07-22

## 2020-07-22 NOTE — TELEPHONE ENCOUNTER
Staff tried to leave voicemail patient box is full to confirm appointment scheduled 7/23/20 at 12 pm as audio call to inform patient that provider may call 15 minutes before visit after.

## 2020-07-23 ENCOUNTER — TELEPHONE (OUTPATIENT)
Dept: PAIN MEDICINE | Facility: CLINIC | Age: 60
End: 2020-07-23

## 2020-07-30 ENCOUNTER — TELEPHONE (OUTPATIENT)
Dept: PAIN MEDICINE | Facility: CLINIC | Age: 60
End: 2020-07-30

## 2020-07-30 ENCOUNTER — HOSPITAL ENCOUNTER (OUTPATIENT)
Facility: OTHER | Age: 60
Discharge: HOME | End: 2020-07-30
Attending: ANESTHESIOLOGY | Admitting: ANESTHESIOLOGY
Payer: COMMERCIAL

## 2020-07-30 VITALS
HEART RATE: 86 BPM | SYSTOLIC BLOOD PRESSURE: 161 MMHG | DIASTOLIC BLOOD PRESSURE: 97 MMHG | HEIGHT: 69 IN | BODY MASS INDEX: 21.92 KG/M2 | OXYGEN SATURATION: 100 % | RESPIRATION RATE: 14 BRPM | TEMPERATURE: 99 F | WEIGHT: 148 LBS

## 2020-07-30 DIAGNOSIS — G89.29 CHRONIC PAIN: ICD-10-CM

## 2020-07-30 DIAGNOSIS — M47.816 LUMBAR SPONDYLOSIS: ICD-10-CM

## 2020-07-30 DIAGNOSIS — M54.16 LUMBAR RADICULOPATHY: Primary | ICD-10-CM

## 2020-07-30 PROCEDURE — 64483 NJX AA&/STRD TFRM EPI L/S 1: CPT | Mod: RT,,, | Performed by: ANESTHESIOLOGY

## 2020-07-30 PROCEDURE — 25500020 PHARM REV CODE 255: Performed by: ANESTHESIOLOGY

## 2020-07-30 PROCEDURE — 64484 NJX AA&/STRD TFRM EPI L/S EA: CPT | Mod: RT,,, | Performed by: ANESTHESIOLOGY

## 2020-07-30 PROCEDURE — 25000003 PHARM REV CODE 250: Performed by: ANESTHESIOLOGY

## 2020-07-30 PROCEDURE — 64484 NJX AA&/STRD TFRM EPI L/S EA: CPT | Mod: RT | Performed by: ANESTHESIOLOGY

## 2020-07-30 PROCEDURE — 64483 PR EPIDURAL INJ, ANES/STEROID, TRANSFORAMINAL, LUMB/SACR, SNGL LEVL: ICD-10-PCS | Mod: RT,,, | Performed by: ANESTHESIOLOGY

## 2020-07-30 PROCEDURE — 64484 PRA INJECT ANES/STEROID FORAMEN LUMBAR/SACRAL W IMG GUIDE ,EA ADD LEVEL: ICD-10-PCS | Mod: RT,,, | Performed by: ANESTHESIOLOGY

## 2020-07-30 PROCEDURE — 64483 NJX AA&/STRD TFRM EPI L/S 1: CPT | Mod: RT | Performed by: ANESTHESIOLOGY

## 2020-07-30 PROCEDURE — 63600175 PHARM REV CODE 636 W HCPCS: Performed by: ANESTHESIOLOGY

## 2020-07-30 RX ORDER — DEXAMETHASONE SODIUM PHOSPHATE 100 MG/10ML
INJECTION INTRAMUSCULAR; INTRAVENOUS
Status: DISCONTINUED | OUTPATIENT
Start: 2020-07-30 | End: 2020-07-30 | Stop reason: HOSPADM

## 2020-07-30 RX ORDER — LIDOCAINE HYDROCHLORIDE 10 MG/ML
INJECTION INFILTRATION; PERINEURAL
Status: DISCONTINUED | OUTPATIENT
Start: 2020-07-30 | End: 2020-07-30 | Stop reason: HOSPADM

## 2020-07-30 RX ORDER — LIDOCAINE HYDROCHLORIDE 10 MG/ML
INJECTION, SOLUTION EPIDURAL; INFILTRATION; INTRACAUDAL; PERINEURAL
Status: DISCONTINUED | OUTPATIENT
Start: 2020-07-30 | End: 2020-07-30 | Stop reason: HOSPADM

## 2020-07-30 RX ORDER — SODIUM CHLORIDE 9 MG/ML
500 INJECTION, SOLUTION INTRAVENOUS CONTINUOUS
Status: DISCONTINUED | OUTPATIENT
Start: 2020-07-30 | End: 2020-07-30 | Stop reason: HOSPADM

## 2020-07-30 NOTE — OP NOTE
Date of Service: 07/30/2020    PCP: Nasrin Benz PA-C    Referring Physician:    Time-out taken to identify patient and procedure side prior to starting the procedure.   I attest that I have reviewed the patient's home medications prior to the procedure and no contraindication have been identified. I  re-evaluated the patient after the patient was positioned for the procedure in the procedure room immediately before the procedural time-out. The vital signs are current and represent the current state of the patient which has not significantly changed since the preprocedure assessment.                                                           PROCEDURE: Right L3/4 & L4/5 transforaminal epidural steroid injection under fluoroscopy    REASON FOR PROCEDURE: Right Lumbar radiculopathy [M54.16]  1. Lumbar radiculopathy    2. Lumbar spondylosis    3. Chronic pain      POSTPROCEDURE DIAGNOSIS:   Lumbar radiculopathy [M54.16]    1. Lumbar radiculopathy    2. Lumbar spondylosis    3. Chronic pain           PHYSICIAN: Pascual Yadav MD  ASSISTANTS: Fausto Castanon MD Resident       MEDICATIONS INJECTED:  Preservative-free dexamethasone 10mg, Xylocaine 1% MPF 3-5ml. 3ml per level. Preservative free, sterile normal saline is used to get larger volume as needed.  LOCAL ANESTHETIC INJECTED:  Xylocaine 1% 9ml with Sodium Bicarbonate 1ml. 3ml per site.    SEDATION MEDICATIONS: None    ESTIMATED BLOOD LOSS:  None.    COMPLICATIONS:  None.    TECHNIQUE:   Laying in a prone position, the patient was prepped and draped in the usual sterile fashion using ChloraPrep and fenestrated drape.  The area to be injected was determined under fluoroscopic guidance.  Local anesthetic was given by raising a wheel and going down to the hub of a 27-gauge 1.25 inch needle.  The 3.5inch 22-gauge spinal needle was introduced towards the transverse process of all levels as stated above.   The needle was walked medially then hinged into the neural foramen.   Omnipaque was injected to confirm appropriate placement and that there was no vascular runoff.  The medication was then injected after applying negative pressure. The patient tolerated the procedure well.    PAIN BEFORE THE PROCEDURE: 10/10.    PAIN AFTER THE PROCEDURE: 0/10.    The patient was monitored after the procedure.  Patient was given post procedure and discharge instructions to follow at home.  We will see the patient back in two weeks or the patient may call to inform of status. The patient was discharged in a stable condition.

## 2020-07-30 NOTE — DISCHARGE SUMMARY
Discharge Note  Short Stay      SUMMARY     Admit Date: 7/30/2020    Attending Physician: Pascual Yadav      Discharge Physician: Pascual Yadav      Discharge Date: 7/30/2020 11:00 AM    Procedure(s) (LRB):  INJECTION, STEROID, EPIDURAL, TRANSFORAMINAL APPROACH (Right)    Final Diagnosis: Lumbar radiculopathy [M54.16]    Disposition: Home or self care    Patient Instructions:   Current Discharge Medication List      CONTINUE these medications which have NOT CHANGED    Details   DULoxetine (CYMBALTA) 30 MG capsule Take 1 capsule (30 mg total) by mouth once daily.  Qty: 30 capsule, Refills: 2    Associated Diagnoses: Lumbar radiculopathy; Osteoarthritis of spine with radiculopathy, lumbar region; Lumbar spondylosis; DDD (degenerative disc disease), lumbar; Chronic pain disorder      gabapentin (NEURONTIN) 400 MG capsule TAKE 1 CAPSULE BY MOUTH IN THE MORNING , 1 CAPSULE IN THE AFTERNOON AND 2 CAPSULES AT BEDTIME  Qty: 120 capsule, Refills: 4    Comments: Please consider 90 day supplies to promote better adherence  Associated Diagnoses: Lumbar radiculopathy; Osteoarthritis of spine with radiculopathy, lumbar region; Facet arthritis of lumbar region; DDD (degenerative disc disease), lumbar      hydroCHLOROthiazide (HYDRODIURIL) 25 MG tablet                  Discharge Diagnosis: Lumbar radiculopathy [M54.16]  Condition on Discharge: Stable with no complications to procedure   Diet on Discharge: Same as before.  Activity: as per instruction sheet.  Discharge to: Home with a responsible adult.  Follow up: 2-4 weeks       Please call my office or pager at [phone] if experienced any weakness or loss of sensation, fever > 101.5, pain uncontrolled with oral medications, persistent nausea/vomiting/or diarrhea, redness or drainage from the incisions, or any other worrisome concerns. If physician on call was not reached or could not communicate with our office for any reason please go to the nearest emergency department

## 2020-07-30 NOTE — H&P
"HPI  Patient presenting for Procedure(s) (LRB):  INJECTION, STEROID, EPIDURAL, TRANSFORAMINAL APPROACH (Right)     Patient on Anti-coagulation No    No health changes since previous encounter    Past Medical History:   Diagnosis Date    Heart murmur     Hypertension      Past Surgical History:   Procedure Laterality Date    KNEE SURGERY      TRANSFORAMINAL EPIDURAL INJECTION OF STEROID Right 8/1/2019    Procedure: INJECTION, STEROID, EPIDURAL, TRANSFORAMINAL APPROACH;  Surgeon: Pascual Yadav MD;  Location: [facility]IN [facility];  Service: Pain Management;  Laterality: Right;    TRANSFORAMINAL EPIDURAL INJECTION OF STEROID Right 2/3/2020    Procedure: INJECTION, STEROID, EPIDURAL, TRANSFORAMINAL APPROACH, L4-L5  AND L5-S1;  Surgeon: Pascual Yadav MD;  Location: [facility] PAIN [facility];  Service: Pain Management;  Laterality: Right;     Review of patient's allergies indicates:   Allergen Reactions    Lipitor [atorvastatin] Swelling      Current Facility-Administered Medications   Medication    0.9%  NaCl infusion     Facility-Administered Medications Ordered in Other Encounters   Medication    0.9%  NaCl infusion       PMHx, PSHx, Allergies, Medications reviewed in epic    ROS negative except pain complaints in HPI    OBJECTIVE:    BP (!) 164/104 (BP Location: Right arm, Patient Position: Lying)   Pulse 94   Temp 99.2 °F (37.3 °C) (Oral)   Resp 18   Ht 5' 9" (1.753 m)   Wt 67.1 kg (148 lb)   SpO2 99%   BMI 21.86 kg/m²     PHYSICAL EXAMINATION:    GENERAL: Well appearing, in no acute distress, alert and oriented x3.  PSYCH:  Mood and affect appropriate.  SKIN: Skin color, texture, turgor normal, no rashes or lesions which will impact the procedure.  CV: RRR with palpation of the radial artery.  PULM: No evidence of respiratory difficulty, symmetric chest rise. Clear to auscultation.  NEURO: Cranial nerves grossly intact.    Plan:    Proceed with procedure as planned Procedure(s) (LRB):  INJECTION, STEROID, EPIDURAL, " TRANSFORAMINAL APPROACH (Right)    Fausto Castanon  07/30/2020

## 2020-07-30 NOTE — TELEPHONE ENCOUNTER
Staff spoke with patient regarding message appointment has been changed to 8/18/20 at 9 am with Ari Dickerson as in office visit patient verbalized understanding.

## 2020-07-30 NOTE — DISCHARGE INSTRUCTIONS

## 2020-08-17 ENCOUNTER — TELEPHONE (OUTPATIENT)
Dept: PAIN MEDICINE | Facility: CLINIC | Age: 60
End: 2020-08-17

## 2020-08-17 NOTE — TELEPHONE ENCOUNTER
Staff spoke with patient regarding appointment 8/18/20 at 9 am with Ari Dickerson Np as in office visit and to inform patient of direct line to call before entering the building also to arrive 15 minutes early but we ask that patient come alone unless its an essential visitor patient verbalized understanding.

## 2020-08-18 ENCOUNTER — TELEPHONE (OUTPATIENT)
Dept: PAIN MEDICINE | Facility: CLINIC | Age: 60
End: 2020-08-18

## 2020-08-18 ENCOUNTER — OFFICE VISIT (OUTPATIENT)
Dept: PAIN MEDICINE | Facility: CLINIC | Age: 60
End: 2020-08-18
Payer: COMMERCIAL

## 2020-08-18 VITALS
WEIGHT: 154 LBS | HEIGHT: 69 IN | DIASTOLIC BLOOD PRESSURE: 102 MMHG | SYSTOLIC BLOOD PRESSURE: 150 MMHG | BODY MASS INDEX: 22.81 KG/M2 | RESPIRATION RATE: 18 BRPM | TEMPERATURE: 99 F | HEART RATE: 92 BPM

## 2020-08-18 DIAGNOSIS — M51.36 DDD (DEGENERATIVE DISC DISEASE), LUMBAR: ICD-10-CM

## 2020-08-18 DIAGNOSIS — M47.816 FACET ARTHRITIS OF LUMBAR REGION: ICD-10-CM

## 2020-08-18 DIAGNOSIS — G89.4 CHRONIC PAIN DISORDER: ICD-10-CM

## 2020-08-18 DIAGNOSIS — M54.16 LUMBAR RADICULOPATHY: ICD-10-CM

## 2020-08-18 DIAGNOSIS — M47.26 OSTEOARTHRITIS OF SPINE WITH RADICULOPATHY, LUMBAR REGION: ICD-10-CM

## 2020-08-18 DIAGNOSIS — M47.816 LUMBAR SPONDYLOSIS: ICD-10-CM

## 2020-08-18 PROCEDURE — 99999 PR PBB SHADOW E&M-EST. PATIENT-LVL IV: CPT | Mod: PBBFAC,,, | Performed by: NURSE PRACTITIONER

## 2020-08-18 PROCEDURE — 99213 OFFICE O/P EST LOW 20 MIN: CPT | Mod: S$GLB,,, | Performed by: NURSE PRACTITIONER

## 2020-08-18 PROCEDURE — 3008F PR BODY MASS INDEX (BMI) DOCUMENTED: ICD-10-PCS | Mod: CPTII,S$GLB,, | Performed by: NURSE PRACTITIONER

## 2020-08-18 PROCEDURE — 99999 PR PBB SHADOW E&M-EST. PATIENT-LVL IV: ICD-10-PCS | Mod: PBBFAC,,, | Performed by: NURSE PRACTITIONER

## 2020-08-18 PROCEDURE — 99213 PR OFFICE/OUTPT VISIT, EST, LEVL III, 20-29 MIN: ICD-10-PCS | Mod: S$GLB,,, | Performed by: NURSE PRACTITIONER

## 2020-08-18 PROCEDURE — 3008F BODY MASS INDEX DOCD: CPT | Mod: CPTII,S$GLB,, | Performed by: NURSE PRACTITIONER

## 2020-08-18 RX ORDER — TIZANIDINE 4 MG/1
4 TABLET ORAL EVERY 8 HOURS PRN
Qty: 30 TABLET | Refills: 0 | Status: SHIPPED | OUTPATIENT
Start: 2020-08-18 | End: 2020-08-28

## 2020-08-18 RX ORDER — DULOXETIN HYDROCHLORIDE 30 MG/1
30 CAPSULE, DELAYED RELEASE ORAL DAILY
Qty: 30 CAPSULE | Refills: 5 | Status: SHIPPED | OUTPATIENT
Start: 2020-08-18 | End: 2021-01-29 | Stop reason: SDUPTHER

## 2020-08-18 RX ORDER — AMLODIPINE BESYLATE 5 MG/1
TABLET ORAL
COMMUNITY
Start: 2020-07-27

## 2020-08-18 RX ORDER — GABAPENTIN 400 MG/1
800 CAPSULE ORAL 3 TIMES DAILY
Qty: 120 CAPSULE | Refills: 5 | Status: SHIPPED | OUTPATIENT
Start: 2020-08-18 | End: 2020-11-19

## 2020-08-18 NOTE — TELEPHONE ENCOUNTER
----- Message from Lexie Richards sent at 8/18/2020 10:32 AM CDT -----   Name of Who is Calling:     What is the request in detail:   pharmacy request call back  to verify directions for medication    gabapentin (NEURONTIN) 400 MG capsule  Please contact to further discuss and advise      Can the clinic reply by [facility]:     What Number to Call Back if not in [facility]:  paul /sharad / [phone]

## 2020-08-18 NOTE — PROGRESS NOTES
Chronic Pain-Established Note (Follow up visit)          Chronic patient Established Note (Follow up visit)      SUBJECTIVE:    Interval History (8/18/2020):  The patient presents for follow up of lower back pain and right leg radiculopathy. He is s/p R L3/4 & L4/5 TFESI without any focal relief of pain. Pt state R leg pain is > Lumbago and he has difficulty describing pattern but points to lateral and anterior right thigh down anterior lower leg. Pain is described as sharp, aching, shooting, worse with standing, sitting and lying down. He denies focal a.m. stiffness, denies pain with valsalva, denies forward flexion alleviating pain. There are no mitigating factors except medications. He continues to take gabapentin 800mg TID and also taking Cymbalta QHS without adverse side effects.  He denies any neurological emergency symptoms such as loss of bowel or bladder, denies saddle paresthesia.  He again, despite voicing this multiple times, has not obtained updated MRI and last one was over 2yrs ago and states he is ready to consider surgical options if he is a candidate.     Interval History (6/2/2020):  The patient presents for follow-up of lower back pain.  Patient has associated radiculopathy down right leg posterior down into heel.  Patient has had no significant relief from prior epidural.  He has no recent imaging.  Patient started Cymbalta 30 mg q.h.s. and is taking gabapentin 400 mg q.a.m. and during day as well as 2 at bedtime.  He states pain is not focally worse in morning, it is worse with movement and standing.  He does not endorse relief with forward flexion.  He states stretching to contralateral side alleviates pain minimal.  He denies loss of bowel, bladder, denies saddle paresthesias.  He rates his pain 7/10 today.      Interval History(4/6/2020):  Contacted Mr. Liu at home for follow-up of his chronic low back pain. He states that since his last appointment, his pain has significantly improved.  He continues to endorse intermittent right-sided low back pain with occasional radiation down his right leg to his right calf. He was started on trial of Cymbalta last month which he states has provided significant relief of his overall pain. In addition, he continues ot take gabapentin with added relief. He has also participated in home exercises over this time.     Interval History (3/3/2020):  Nacho Liu presents to the clinic for a follow-up appointment for low back and leg pain. He is s/p right L4/5 and L5/S1 TF PAVITHRA on 2/3/2020. He reports no significant relief of his pain. He continues to report low back pain that radiates down the side and back of his right leg to mid calf. He also reports burning pain into the anteromedial aspect of his right thigh. He denies any left leg pain. His pain is worse with prolonged standing, especially with work. He continues to take Gabapentin with limited relief. He denies any other health changes. He denies any bowel or bladder incontinence. His pain today is 10/10.      Pain Disability Index Review:  Last 3 PDI Scores 8/18/2020 3/3/2020 1/21/2020   Pain Disability Index (PDI) 68 64 0       Pain Medications:  Gabapentin 800 mg TID  Cymbalta 30mg QD    Tried in the Past:  Lyrica (too expensive)      Opioid Contract: no     report:  Reviewed and consistent with medication use as prescribed.    Pain Procedures:   2 previous ESIs at [facility] in 2018  8/30/2018- Right L4 and S1 TF PAVITHRA   10/29/2018- Right L5 and S1 TF PAVITHRA  12/3/2018- Right L4 and L5 TF PAVITHRA  8/1/2019- Right L4/5 and L5/S1 TF PAVITHRA- significant relief  2/3/2020- Right L4/5 and L5/S1 TF PAVITHRA- no relief  8/18/2020: Right L3/4&4/5 TFESI- no relief     Physical Therapy/Home Exercise: no    Imaging:   MRI Lumbar Spine 5/31/2018:  FINDINGS:  There is straightening of the normal lumbar lordosis.  Heterogeneous T1 bone marrow signal throughout the lumbar spine without focal bone marrow edema.  Overall concerning for  red  marrow reconversion clinical correlation for underlying chronic anemia, no evidence for focal infiltrative process.    There is scattered endplate degeneration most prominent at L4/L5 level with superimposed disc desiccation and height loss.    The distal spinal cord and conus is normal in signal and contour tip of the conus approximates the inferior L1 level.    No aneurysmal dilatation of the visualized abdominal aorta.    T12/L1 through L1/L2: No significant disc bulge, central canal or neural foraminal stenosis.    L2/L3: No significant disc bulge central canal or neural foraminal stenosis.    L3/L4:Small posterior disc osteophyte without significant central canal or neural foraminal stenosis    L4/L5:Posterior disc osteophyte with ligamentum flavum hypertrophy and small annular fissure with mild central canal stenosis and mild neural foraminal narrowing.    L5/S1: Small posterior disc osteophyte with ligamentum flavum hypertrophy without significant central canal stenosis with attenuation of the thecal sac related to epidural lipomatosis.  There is facet joint arthropathy and mild moderate neural foraminal stenosis bilaterally.    This report was flagged in Epic as abnormal.      Impression       Multilevel degenerative change lumbar spine most pronounced at L4/L5 with posterior disc osteophyte with ligamentum flavum hypertrophy and small annular fissure there is mild central canal and neural foraminal stenosis.    There is heterogeneous T1 bone marrow signal throughout the lumbosacral spine most compatible with red marrow reconversion clinical correlation for possible underlying anemia.     EMG 7/5/2018:  Normal study    Allergies:   Review of patient's allergies indicates:   Allergen Reactions    Lipitor [atorvastatin] Swelling       Current Medications:   Current Outpatient Medications   Medication Sig Dispense Refill    DULoxetine (CYMBALTA) 30 MG capsule Take 1 capsule (30 mg total) by mouth once daily.  30 capsule 2    gabapentin (NEURONTIN) 400 MG capsule TAKE 1 CAPSULE BY MOUTH IN THE MORNING , 1 CAPSULE IN THE AFTERNOON AND 2 CAPSULES AT BEDTIME 120 capsule 4    hydroCHLOROthiazide (HYDRODIURIL) 25 MG tablet       amLODIPine (NORVASC) 5 MG tablet TAKE 1 TABLET BY MOUTH 1 TIME PER DAY FOR BLOOD PRESSURE       No current facility-administered medications for this visit.      Facility-Administered Medications Ordered in Other Visits   Medication Dose Route Frequency Provider Last Rate Last Dose    0.9%  NaCl infusion  500 mL Intravenous Continuous Adryan More MD           REVIEW OF SYSTEMS:    GENERAL:  No weight loss, malaise or fevers.  HEENT:  Negative for frequent or significant headaches.  NECK:  Negative for lumps, goiter, pain and significant neck swelling.  RESPIRATORY:  Negative for cough, wheezing or shortness of breath.  CARDIOVASCULAR:  Negative for chest pain, leg swelling or palpitations. HTN  GI:  Negative for abdominal discomfort, blood in stools or black stools or change in bowel habits.  MUSCULOSKELETAL:  See HPI.  SKIN:  Negative for lesions, rash, and itching.  PSYCH:  Negative for sleep disturbance, mood disorder and recent psychosocial stressors.  HEMATOLOGY/LYMPHOLOGY:  Negative for prolonged bleeding, bruising easily or swollen nodes.  NEURO:   No history of headaches, syncope, paralysis, seizures or tremors.  All other reviewed and negative other than HPI.    Past Medical History:  Past Medical History:   Diagnosis Date    Heart murmur     Hypertension        Past Surgical History:  Past Surgical History:   Procedure Laterality Date    KNEE SURGERY      TRANSFORAMINAL EPIDURAL INJECTION OF STEROID Right 8/1/2019    Procedure: INJECTION, STEROID, EPIDURAL, TRANSFORAMINAL APPROACH;  Surgeon: Pascual Yadav MD;  Location: [facility];  Service: Pain Management;  Laterality: Right;    TRANSFORAMINAL EPIDURAL INJECTION OF STEROID Right 2/3/2020    Procedure: INJECTION, STEROID,  "EPIDURAL, TRANSFORAMINAL APPROACH, L4-L5  AND L5-S1;  Surgeon: Pascual Yadav MD;  Location: [facility];  Service: Pain Management;  Laterality: Right;    TRANSFORAMINAL EPIDURAL INJECTION OF STEROID Right 7/30/2020    Procedure: INJECTION, STEROID, EPIDURAL, TRANSFORAMINAL APPROACH;  Surgeon: Pascual Yadav MD;  Location: [facility];  Service: Pain Management;  Laterality: Right;  RIGHT TF PAVITHRA L3/4 and L4/5   consent needed       Family History:  Family History   Problem Relation Age of Onset    Arthritis Mother     Cirrhosis Father        Social History:  Social History     Socioeconomic History    Marital status:      Spouse name: Not on file    Number of children: Not on file    Years of education: Not on file    Highest education level: Not on file   Occupational History    Not on file   Social Needs    Financial resource strain: Not on file    Food insecurity     Worry: Not on file     Inability: Not on file    Transportation needs     Medical: Not on file     Non-medical: Not on file   Tobacco Use    Smoking status: Never Smoker    Smokeless tobacco: Never Used   Substance and Sexual Activity    Alcohol use: Yes    Drug use: No    Sexual activity: Not on file   Lifestyle    Physical activity     Days per week: Not on file     Minutes per session: Not on file    Stress: Not on file   Relationships    Social connections     Talks on phone: Not on file     Gets together: Not on file     Attends Rastafarian service: Not on file     Active member of club or organization: Not on file     Attends meetings of clubs or organizations: Not on file     Relationship status: Not on file   Other Topics Concern    Not on file   Social History Narrative    Not on file       OBJECTIVE:    BP (!) 150/102   Pulse 92   Temp 98.5 °F (36.9 °C) (Oral)   Resp 18   Ht 5' 9" (1.753 m)   Wt 69.9 kg (154 lb)   BMI 22.74 kg/m²       PHYSICAL EXAMINATION:    General appearance: Well appearing, in no acute " distress, alert and oriented x3.  Psych:  Mood and affect appropriate.  Skin: Skin color, texture, turgor normal, no rashes or lesions, in both upper and lower body.  Head/face:  Atraumatic, normocephalic. No palpable lymph nodes.  Cor: regular rate  Pulm: normal effort   Back: Straight leg raising in the sitting position is negative bilaterally. There is no facet loading bilaterally.    Extremities: Peripheral joint ROM is full and pain free without obvious instability or laxity in all four extremities. No deformities, edema, or skin discoloration.  Musculoskeletal:  He has 4/5 hip flexion, knee extension and flexion bilaterally, 5/5 bilaterally plantar flexion and EHL. There is mild decrease in Dorsiflexion on Right.  No atrophy or tone abnormalities are noted.  Neuro:  Bilateral lower extremity coordination and muscle stretch reflexes are physiologic and symmetric.  Plantar response are downgoing. Decreased sensation to right calf and dorsum of  foot. Patellar reflexes diminished equally bilateral.   Gait: Antalgic- ambulates without assistance.     ASSESSMENT: 59 y.o. year old male with low back and leg pain, consistent with the followin. Lumbar radiculopathy  DULoxetine (CYMBALTA) 30 MG capsule    gabapentin (NEURONTIN) 400 MG capsule    X-Ray Lumbar Spine Flexion And Extension Only   2. Osteoarthritis of spine with radiculopathy, lumbar region  DULoxetine (CYMBALTA) 30 MG capsule    gabapentin (NEURONTIN) 400 MG capsule   3. Lumbar spondylosis  DULoxetine (CYMBALTA) 30 MG capsule    Ambulatory referral/consult to Neurosurgery   4. DDD (degenerative disc disease), lumbar  DULoxetine (CYMBALTA) 30 MG capsule    gabapentin (NEURONTIN) 400 MG capsule   5. Chronic pain disorder  DULoxetine (CYMBALTA) 30 MG capsule   6. Facet arthritis of lumbar region  gabapentin (NEURONTIN) 400 MG capsule         PLAN:     - Prior records reviewed  - He is s/p multiple ESIs without any benefit  - Discussed obtaining  previously ordered MRI as pain is unchanged from ESIs.   - Ordered Flexion/Extension Lumbar xray   - Refilled Gabapentin 800 mg TID.   - Refill Cymbalta 30 mg daily.   - Start Zanaflex 4mg TID. Discussed starting this at qhs dosing and titration discussed.  - Patient can continue with medications for now since they are providing benefits, using them appropriately, and without side effects.  - Counseled patient regarding the importance of constant sleeping habits.  - Referred to NS placed  - Can consider Nova Basile back SCS trail if not surgical candidate.   - RTC after following up with NS for further treatment options.   The above plan and management options were discussed at length with patient. Patient is in agreement with the above and verbalized understanding.    Ari Dickerson NP  08/18/2020

## 2020-08-18 NOTE — TELEPHONE ENCOUNTER
Staff spoke with patient to let patient know that staff has contacted the pharmacy regarding medication and can contact them to see when to  patient verbalized understanding

## 2020-08-21 ENCOUNTER — HOSPITAL ENCOUNTER (OUTPATIENT)
Dept: RADIOLOGY | Facility: OTHER | Age: 60
Discharge: HOME | End: 2020-08-21
Attending: NURSE PRACTITIONER
Payer: COMMERCIAL

## 2020-08-21 DIAGNOSIS — M54.16 LUMBAR RADICULOPATHY: ICD-10-CM

## 2020-08-21 PROCEDURE — 72120 XR LUMBAR SPINE FLEXION AND EXTENSION ONLY: ICD-10-PCS | Mod: 26,,, | Performed by: RADIOLOGY

## 2020-08-21 PROCEDURE — 72148 MRI LUMBAR SPINE WITHOUT CONTRAST: ICD-10-PCS | Mod: 26,,, | Performed by: RADIOLOGY

## 2020-08-21 PROCEDURE — 72148 MRI LUMBAR SPINE W/O DYE: CPT | Mod: 26,,, | Performed by: RADIOLOGY

## 2020-08-21 PROCEDURE — 72120 X-RAY BEND ONLY L-S SPINE: CPT | Mod: TC,FY

## 2020-08-21 PROCEDURE — 72120 X-RAY BEND ONLY L-S SPINE: CPT | Mod: 26,,, | Performed by: RADIOLOGY

## 2020-08-21 PROCEDURE — 72148 MRI LUMBAR SPINE W/O DYE: CPT | Mod: TC

## 2020-09-15 ENCOUNTER — OFFICE VISIT (OUTPATIENT)
Dept: NEUROSURGERY | Facility: CLINIC | Age: 60
End: 2020-09-15
Payer: COMMERCIAL

## 2020-09-15 DIAGNOSIS — M47.816 LUMBAR SPONDYLOSIS: ICD-10-CM

## 2020-09-15 DIAGNOSIS — M54.16 LUMBAR RADICULOPATHY: Primary | ICD-10-CM

## 2020-09-15 PROCEDURE — 99999 PR PBB SHADOW E&M-EST. PATIENT-LVL I: ICD-10-PCS | Mod: PBBFAC,,, | Performed by: NEUROLOGICAL SURGERY

## 2020-09-15 PROCEDURE — 99204 OFFICE O/P NEW MOD 45 MIN: CPT | Mod: S$GLB,,, | Performed by: NEUROLOGICAL SURGERY

## 2020-09-15 PROCEDURE — 99204 PR OFFICE/OUTPT VISIT, NEW, LEVL IV, 45-59 MIN: ICD-10-PCS | Mod: S$GLB,,, | Performed by: NEUROLOGICAL SURGERY

## 2020-09-15 PROCEDURE — 99999 PR PBB SHADOW E&M-EST. PATIENT-LVL I: CPT | Mod: PBBFAC,,, | Performed by: NEUROLOGICAL SURGERY

## 2020-09-15 NOTE — PROGRESS NOTES
Neurosurgery  History & Physical    SUBJECTIVE:     Chief Complaint: radicular pain     History of Present Illness:  Nacho Liu is a 59 y.o. male with a 2-year history of radicular leg pain down the right leg. He has no back pain. The pain began spontaneously with no particular inciting event. He has been seen by pain management and has undergone multiple PAVITHRA without significant relief. He works as an . His pain is worst upon first awakening in the morning and improves after moving throughout the day. His leg pain is 10/10 and back pain is 0/10. The pain is described as burning. It is made worse by getting up from lying down. It improves with moving, standing. He denies changes in bowel/bladder function. He has undergone physical therapy one year ago for 6 weeks without significant improvement. He has not tried medrol dose pack.     Of note, he underwent knee surgery for a ligamentous tear sustained while jumping a fence. He denies any bleeding, infectious, or anesthetic complication.     Review of patient's allergies indicates:   Allergen Reactions    Lipitor [atorvastatin] Swelling       Current Outpatient Medications   Medication Sig Dispense Refill    amLODIPine (NORVASC) 5 MG tablet TAKE 1 TABLET BY MOUTH 1 TIME PER DAY FOR BLOOD PRESSURE      DULoxetine (CYMBALTA) 30 MG capsule Take 1 capsule (30 mg total) by mouth once daily. 30 capsule 5    gabapentin (NEURONTIN) 400 MG capsule Take 2 capsules (800 mg total) by mouth 3 (three) times daily. TAKE 1 CAPSULE BY MOUTH IN THE MORNING , 1 CAPSULE IN THE AFTERNOON AND 2 CAPSULES AT BEDTIME 120 capsule 5    hydroCHLOROthiazide (HYDRODIURIL) 25 MG tablet        No current facility-administered medications for this visit.      Facility-Administered Medications Ordered in Other Visits   Medication Dose Route Frequency Provider Last Rate Last Dose    0.9%  NaCl infusion  500 mL Intravenous Continuous Adryan More MD           Past Medical  History:   Diagnosis Date    Heart murmur     Hypertension      Past Surgical History:   Procedure Laterality Date    KNEE SURGERY      TRANSFORAMINAL EPIDURAL INJECTION OF STEROID Right 8/1/2019    Procedure: INJECTION, STEROID, EPIDURAL, TRANSFORAMINAL APPROACH;  Surgeon: Pascual Yadav MD;  Location: [facility];  Service: Pain Management;  Laterality: Right;    TRANSFORAMINAL EPIDURAL INJECTION OF STEROID Right 2/3/2020    Procedure: INJECTION, STEROID, EPIDURAL, TRANSFORAMINAL APPROACH, L4-L5  AND L5-S1;  Surgeon: Pascual Yadav MD;  Location: [facility];  Service: Pain Management;  Laterality: Right;    TRANSFORAMINAL EPIDURAL INJECTION OF STEROID Right 7/30/2020    Procedure: INJECTION, STEROID, EPIDURAL, TRANSFORAMINAL APPROACH;  Surgeon: Pascual Yadav MD;  Location: [facility];  Service: Pain Management;  Laterality: Right;  RIGHT TF PAVITHRA L3/4 and L4/5   consent needed     Family History     Problem Relation (Age of Onset)    Arthritis Mother    Cirrhosis Father        Social History     Socioeconomic History    Marital status:      Spouse name: Not on file    Number of children: Not on file    Years of education: Not on file    Highest education level: Not on file   Occupational History    Not on file   Social Needs    Financial resource strain: Not on file    Food insecurity     Worry: Not on file     Inability: Not on file    Transportation needs     Medical: Not on file     Non-medical: Not on file   Tobacco Use    Smoking status: Never Smoker    Smokeless tobacco: Never Used   Substance and Sexual Activity    Alcohol use: Yes    Drug use: No    Sexual activity: Not on file   Lifestyle    Physical activity     Days per week: Not on file     Minutes per session: Not on file    Stress: Not on file   Relationships    Social connections     Talks on phone: Not on file     Gets together: Not on file     Attends Congregation service: Not on file     Active member of club or  organization: Not on file     Attends meetings of clubs or organizations: Not on file     Relationship status: Not on file   Other Topics Concern    Not on file   Social History Narrative    Not on file       Review of Systems   Constitutional: Negative for fever.   HENT: Negative for nosebleeds.    Eyes: Negative for visual disturbance.   Respiratory: Negative for shortness of breath.    Cardiovascular: Negative for chest pain.   Gastrointestinal: Negative for vomiting.   Endocrine: Negative for cold intolerance.   Genitourinary: Negative for difficulty urinating.   Musculoskeletal: Negative for back pain.   Skin: Negative for color change.   Neurological: Negative for headaches.   Hematological: Does not bruise/bleed easily.   Psychiatric/Behavioral: The patient is not nervous/anxious.        OBJECTIVE:     Vital Signs     There is no height or weight on file to calculate BMI.      Physical Exam:    Constitutional: He appears well-developed and well-nourished. No distress.     Eyes: EOM are normal.     Cardiovascular: No edema.     Abdominal: Soft.     Skin: Skin displays no rash on extremities. Skin displays no lesions on extremities.     Psych/Behavior: He is alert. He is oriented to person, place, and time.     Musculoskeletal:        Back: There is no tenderness.      Comments: No focal weakness     Neurological:        Coordination: He has normal finger to nose coordination.        Sensory: There is no sensory deficit in the trunk. There is no sensory deficit in the extremities.        DTRs: DTRs are DTRS NORMAL AND SYMMETRICnormal and symmetric.        Cranial nerves:   Face symmetric, shoulder shrug equal bilaterally    Pulmonary: symmetric expansion     Diagnostic Results:  MRI and flex/ex personally reviewed   MRI demonstrates mild spondylosis most significant at L4-L5/L5-S1   No significant neuroforaminal stenosis (slightly more on left than right)   No dynamic instability on flex/ex   Reviewed in  detail with the patient     ASSESSMENT/PLAN:     Nacho Liu is a 59 y.o. male with significant and unremitting right radicular pain in the absence of correlative radiographic explanation. I do not see a role for traditional decompressive or fusion surgery at this time.     I have recommended that he consider SCS trial to see if that provides therapeutic benefit for his debilitating pain (which is now interfering with his ability to work). If he has positive response to the trial, I would be happy to see him back for laminectomy for implantation.     If SCS is not revealing, we may consider EMG-NCS as the patient does report history of previous knee surgery on the ipsilateral knee (and it is possible that a peripheral nerve may be the source of his chronic pain).     I will be happy to see him back on an as-needed basis. I have advised him to contact the clinic with any questions, concerns, or adverse clinical change.

## 2020-09-15 NOTE — LETTER
September 15, 2020      Ari Dickerson, NP  [address] [facility] [address]           [facility] - Neurosurgery 7th Fl  [address]  [facility] [address]  Phone: [phone]  Fax: [phone]          Patient: Nacho Liu   MR Number: [id number]   YOB: 1960   Date of Visit: 9/15/2020       Dear Ari Dickerson:    Thank you for referring Nacho Liu to me for evaluation. Attached you will find relevant portions of my assessment and plan of care.    If you have questions, please do not hesitate to call me. I look forward to following Nacho Liu along with you.    Sincerely,    Serena Gomez MD    Enclosure  CC:  No Recipients    If you would like to receive this communication electronically, please contact [email or web address] or [phone] to request more information on [employer] Link access.    For providers and/or their staff who would like to refer a patient to [facility], please contact us through our one-stop-shop provider referral line, [facility] , at [phone].    If you feel you have received this communication in error or would no longer like to receive these types of communications, please e-mail [email or web address]

## 2020-11-02 ENCOUNTER — OFFICE VISIT (OUTPATIENT)
Dept: PAIN MEDICINE | Facility: CLINIC | Age: 60
End: 2020-11-02
Payer: COMMERCIAL

## 2020-11-02 VITALS
DIASTOLIC BLOOD PRESSURE: 86 MMHG | SYSTOLIC BLOOD PRESSURE: 129 MMHG | BODY MASS INDEX: 22.85 KG/M2 | OXYGEN SATURATION: 100 % | TEMPERATURE: 99 F | HEART RATE: 79 BPM | WEIGHT: 154.31 LBS | RESPIRATION RATE: 18 BRPM | HEIGHT: 69 IN

## 2020-11-02 DIAGNOSIS — M47.816 LUMBAR SPONDYLOSIS: ICD-10-CM

## 2020-11-02 DIAGNOSIS — M47.26 OSTEOARTHRITIS OF SPINE WITH RADICULOPATHY, LUMBAR REGION: ICD-10-CM

## 2020-11-02 DIAGNOSIS — G89.4 CHRONIC PAIN SYNDROME: ICD-10-CM

## 2020-11-02 DIAGNOSIS — M54.16 LUMBAR RADICULOPATHY: Primary | ICD-10-CM

## 2020-11-02 PROCEDURE — 3008F PR BODY MASS INDEX (BMI) DOCUMENTED: ICD-10-PCS | Mod: CPTII,S$GLB,, | Performed by: NURSE PRACTITIONER

## 2020-11-02 PROCEDURE — 99999 PR PBB SHADOW E&M-EST. PATIENT-LVL III: CPT | Mod: PBBFAC,,, | Performed by: NURSE PRACTITIONER

## 2020-11-02 PROCEDURE — 3008F BODY MASS INDEX DOCD: CPT | Mod: CPTII,S$GLB,, | Performed by: NURSE PRACTITIONER

## 2020-11-02 PROCEDURE — 99999 PR PBB SHADOW E&M-EST. PATIENT-LVL III: ICD-10-PCS | Mod: PBBFAC,,, | Performed by: NURSE PRACTITIONER

## 2020-11-02 PROCEDURE — 99213 OFFICE O/P EST LOW 20 MIN: CPT | Mod: S$GLB,,, | Performed by: NURSE PRACTITIONER

## 2020-11-02 PROCEDURE — 99213 PR OFFICE/OUTPT VISIT, EST, LEVL III, 20-29 MIN: ICD-10-PCS | Mod: S$GLB,,, | Performed by: NURSE PRACTITIONER

## 2020-11-02 NOTE — PROGRESS NOTES
Chronic Pain-Established Note (Follow up visit)          Chronic patient Established Note (Follow up visit)      SUBJECTIVE:    Interval History 11/2/2020:  The patient presents for delayed follow up of R leg radicular complaints. He has seen Dr Gomez with [facility] who did   Not feel he was a surgical candidate.  She she did state to discuss considering EMG and or spinal cord stimulator trial.  He has had EMG prior in 2018 which was reviewed without any acute abnormality and he has no change in pain.  He again has had no focal benefit from transforaminal epidurals. He would like to consider SCS as option at this time.     Interval History (8/18/2020):  The patient presents for follow up of lower back pain and right leg radiculopathy. He is s/p R L3/4 & L4/5 TFESI without any focal relief of pain. Pt state R leg pain is > Lumbago and he has difficulty describing pattern but points to lateral and anterior right thigh down anterior lower leg. Pain is described as sharp, aching, shooting, worse with standing, sitting and lying down. He denies focal a.m. stiffness, denies pain with valsalva, denies forward flexion alleviating pain. There are no mitigating factors except medications. He continues to take gabapentin 800mg TID and also taking Cymbalta QHS without adverse side effects.  He denies any neurological emergency symptoms such as loss of bowel or bladder, denies saddle paresthesia.  He again, despite voicing this multiple times, has not obtained updated MRI and last one was over 2yrs ago and states he is ready to consider surgical options if he is a candidate.     Interval History (6/2/2020):  The patient presents for follow-up of lower back pain.  Patient has associated radiculopathy down right leg posterior down into heel.  Patient has had no significant relief from prior epidural.  He has no recent imaging.  Patient started Cymbalta 30 mg q.h.s. and is taking gabapentin 400 mg q.a.m. and during day as well as 2 at  bedtime.  He states pain is not focally worse in morning, it is worse with movement and standing.  He does not endorse relief with forward flexion.  He states stretching to contralateral side alleviates pain minimal.  He denies loss of bowel, bladder, denies saddle paresthesias.  He rates his pain 7/10 today.      Interval History(4/6/2020):  Contacted Mr. Liu at home for follow-up of his chronic low back pain. He states that since his last appointment, his pain has significantly improved. He continues to endorse intermittent right-sided low back pain with occasional radiation down his right leg to his right calf. He was started on trial of Cymbalta last month which he states has provided significant relief of his overall pain. In addition, he continues ot take gabapentin with added relief. He has also participated in home exercises over this time.     Interval History (3/3/2020):  Nacho Liu presents to the clinic for a follow-up appointment for low back and leg pain. He is s/p right L4/5 and L5/S1 TF PAVITHRA on 2/3/2020. He reports no significant relief of his pain. He continues to report low back pain that radiates down the side and back of his right leg to mid calf. He also reports burning pain into the anteromedial aspect of his right thigh. He denies any left leg pain. His pain is worse with prolonged standing, especially with work. He continues to take Gabapentin with limited relief. He denies any other health changes. He denies any bowel or bladder incontinence. His pain today is 10/10.      Pain Disability Index Review:  Last 3 PDI Scores 11/2/2020 8/18/2020 3/3/2020   Pain Disability Index (PDI) 70 68 64       Pain Medications:  Gabapentin 800 mg TID  Cymbalta 30mg QD    Tried in the Past:  Lyrica (too expensive)      Opioid Contract: no     report:  Reviewed and consistent with medication use as prescribed.    Pain Procedures:   2 previous ESIs at [facility] in 2018  8/30/2018- Right L4 and S1 TF PAVITHRA    10/29/2018- Right L5 and S1 TF PAVITHRA  12/3/2018- Right L4 and L5 TF PAVITHRA  8/1/2019- Right L4/5 and L5/S1 TF PAVITHRA- significant relief  2/3/2020- Right L4/5 and L5/S1 TF PAVITHRA- no relief  8/18/2020: Right L3/4&4/5 TFESI- no relief     Physical Therapy/Home Exercise: no    Imaging:   MRI Lumbar Spine 5/31/2018:  FINDINGS:  There is straightening of the normal lumbar lordosis.  Heterogeneous T1 bone marrow signal throughout the lumbar spine without focal bone marrow edema.  Overall concerning for  red marrow reconversion clinical correlation for underlying chronic anemia, no evidence for focal infiltrative process.    There is scattered endplate degeneration most prominent at L4/L5 level with superimposed disc desiccation and height loss.    The distal spinal cord and conus is normal in signal and contour tip of the conus approximates the inferior L1 level.    No aneurysmal dilatation of the visualized abdominal aorta.    T12/L1 through L1/L2: No significant disc bulge, central canal or neural foraminal stenosis.    L2/L3: No significant disc bulge central canal or neural foraminal stenosis.    L3/L4:Small posterior disc osteophyte without significant central canal or neural foraminal stenosis    L4/L5:Posterior disc osteophyte with ligamentum flavum hypertrophy and small annular fissure with mild central canal stenosis and mild neural foraminal narrowing.    L5/S1: Small posterior disc osteophyte with ligamentum flavum hypertrophy without significant central canal stenosis with attenuation of the thecal sac related to epidural lipomatosis.  There is facet joint arthropathy and mild moderate neural foraminal stenosis bilaterally.    This report was flagged in Epic as abnormal.      Impression       Multilevel degenerative change lumbar spine most pronounced at L4/L5 with posterior disc osteophyte with ligamentum flavum hypertrophy and small annular fissure there is mild central canal and neural foraminal stenosis.    There  is heterogeneous T1 bone marrow signal throughout the lumbosacral spine most compatible with red marrow reconversion clinical correlation for possible underlying anemia.     EMG 7/5/2018:  Normal study    Allergies:   Review of patient's allergies indicates:   Allergen Reactions    Lipitor [atorvastatin] Swelling       Current Medications:   Current Outpatient Medications   Medication Sig Dispense Refill    amLODIPine (NORVASC) 5 MG tablet TAKE 1 TABLET BY MOUTH 1 TIME PER DAY FOR BLOOD PRESSURE      gabapentin (NEURONTIN) 400 MG capsule Take 2 capsules (800 mg total) by mouth 3 (three) times daily. TAKE 1 CAPSULE BY MOUTH IN THE MORNING , 1 CAPSULE IN THE AFTERNOON AND 2 CAPSULES AT BEDTIME 120 capsule 5    hydroCHLOROthiazide (HYDRODIURIL) 25 MG tablet       DULoxetine (CYMBALTA) 30 MG capsule Take 1 capsule (30 mg total) by mouth once daily. 30 capsule 5     No current facility-administered medications for this visit.      Facility-Administered Medications Ordered in Other Visits   Medication Dose Route Frequency Provider Last Rate Last Dose    0.9%  NaCl infusion  500 mL Intravenous Continuous Adryan More MD           REVIEW OF SYSTEMS:    GENERAL:  No weight loss, malaise or fevers.  HEENT:  Negative for frequent or significant headaches.  NECK:  Negative for lumps, goiter, pain and significant neck swelling.  RESPIRATORY:  Negative for cough, wheezing or shortness of breath.  CARDIOVASCULAR:  Negative for chest pain, leg swelling or palpitations. HTN  GI:  Negative for abdominal discomfort, blood in stools or black stools or change in bowel habits.  MUSCULOSKELETAL:  See HPI.  SKIN:  Negative for lesions, rash, and itching.  PSYCH:  Negative for sleep disturbance, mood disorder and recent psychosocial stressors.  HEMATOLOGY/LYMPHOLOGY:  Negative for prolonged bleeding, bruising easily or swollen nodes.  NEURO:   No history of headaches, syncope, paralysis, seizures or tremors.  All other reviewed and  negative other than HPI.    Past Medical History:  Past Medical History:   Diagnosis Date    Heart murmur     Hypertension        Past Surgical History:  Past Surgical History:   Procedure Laterality Date    KNEE SURGERY      TRANSFORAMINAL EPIDURAL INJECTION OF STEROID Right 8/1/2019    Procedure: INJECTION, STEROID, EPIDURAL, TRANSFORAMINAL APPROACH;  Surgeon: Pascual Yadav MD;  Location: [facility], [place], operated by [facility];  Service: Pain Management;  Laterality: Right;    TRANSFORAMINAL EPIDURAL INJECTION OF STEROID Right 2/3/2020    Procedure: INJECTION, STEROID, EPIDURAL, TRANSFORAMINAL APPROACH, L4-L5  AND L5-S1;  Surgeon: Pascual Yadav MD;  Location: [facility], [place], operated by [facility];  Service: Pain Management;  Laterality: Right;    TRANSFORAMINAL EPIDURAL INJECTION OF STEROID Right 7/30/2020    Procedure: INJECTION, STEROID, EPIDURAL, TRANSFORAMINAL APPROACH;  Surgeon: Pascual Yadav MD;  Location: [facility], [place], operated by [facility];  Service: Pain Management;  Laterality: Right;  RIGHT TF PAVITHRA L3/4 and L4/5   consent needed       Family History:  Family History   Problem Relation Age of Onset    Arthritis Mother     Cirrhosis Father        Social History:  Social History     Socioeconomic History    Marital status:      Spouse name: Not on file    Number of children: Not on file    Years of education: Not on file    Highest education level: Not on file   Occupational History    Not on file   Social Needs    Financial resource strain: Not on file    Food insecurity     Worry: Not on file     Inability: Not on file    Transportation needs     Medical: Not on file     Non-medical: Not on file   Tobacco Use    Smoking status: Never Smoker    Smokeless tobacco: Never Used   Substance and Sexual Activity    Alcohol use: Yes    Drug use: No    Sexual activity: Not on file   Lifestyle    Physical activity     Days per week: Not on file     Minutes per session: Not on file    Stress: Not on file   Relationships    Social connections     Talks on  "phone: Not on file     Gets together: Not on file     Attends Confucianist service: Not on file     Active member of club or organization: Not on file     Attends meetings of clubs or organizations: Not on file     Relationship status: Not on file   Other Topics Concern    Not on file   Social History Narrative    Not on file       OBJECTIVE:    /86   Pulse 79   Temp 98.6 °F (37 °C)   Resp 18   Ht 5' 9" (1.753 m)   Wt 70 kg (154 lb 5.2 oz)   SpO2 100%   BMI 22.79 kg/m²       PHYSICAL EXAMINATION:  Voice normal.  Normal affect without evidence of distress.  Back: No facet loading, no GTB or PSIS TTP.     Prior PHYSICAL EXAMINATION:    General appearance: Well appearing, in no acute distress, alert and oriented x3.  Psych:  Mood and affect appropriate.  Skin: Skin color, texture, turgor normal, no rashes or lesions, in both upper and lower body.  Head/face:  Atraumatic, normocephalic. No palpable lymph nodes.  Cor: regular rate  Pulm: normal effort   Back: Straight leg raising in the sitting position is negative bilaterally. There is no facet loading bilaterally.    Extremities: Peripheral joint ROM is full and pain free without obvious instability or laxity in all four extremities. No deformities, edema, or skin discoloration.  Musculoskeletal:  He has 4/5 hip flexion, knee extension and flexion bilaterally, 5/5 bilaterally plantar flexion and EHL. There is mild decrease in Dorsiflexion on Right.  No atrophy or tone abnormalities are noted.  Neuro:  Bilateral lower extremity coordination and muscle stretch reflexes are physiologic and symmetric.  Plantar response are downgoing. Decreased sensation to right calf and dorsum of  foot. Patellar reflexes diminished equally bilateral.   Gait: Antalgic- ambulates without assistance.     ASSESSMENT: 59 y.o. year old male with low back and leg pain, consistent with the followin. Lumbar radiculopathy     2. Chronic pain syndrome     3. Lumbar spondylosis   "   4. Osteoarthritis of spine with radiculopathy, lumbar region           PLAN:     - Prior records reviewed  - He is s/p multiple ESIs without any benefit  - Continue Gabapentin 800 mg TID.   - Continue Cymbalta 30 mg daily.   - Continue Zanaflex 4mg TID. Discussed starting this at qhs dosing and titration discussed.  - Patient can continue with medications for now since they are providing benefits, using them appropriately, and without side effects.  - He has seen [facility] who does not feel he is surgical candidate.   - Can consider [facility] back SCS trail, Will discuss with Dr Yadav who is his provider.   The above plan and management options were discussed at length with patient. Patient is in agreement with the above and verbalized understanding.    Ari Dickerson NP  11/02/2020

## 2020-11-02 NOTE — PROGRESS NOTES
Chronic Pain-Established Note (Follow up visit)          Chronic patient Established Note (Follow up visit)      SUBJECTIVE:    Interval History (8/18/2020):  The patient presents for follow up of lower back pain and right leg radiculopathy. He is s/p R L3/4 & L4/5 TFESI without any focal relief of pain. Pt state R leg pain is > Lumbago and he has difficulty describing pattern but points to lateral and anterior right thigh down anterior lower leg. Pain is described as sharp, aching, shooting, worse with standing, sitting and lying down. He denies focal a.m. stiffness, denies pain with valsalva, denies forward flexion alleviating pain. There are no mitigating factors except medications. He continues to take gabapentin 800mg TID and also taking Cymbalta QHS without adverse side effects.  He denies any neurological emergency symptoms such as loss of bowel or bladder, denies saddle paresthesia.  He again, despite voicing this multiple times, has not obtained updated MRI and last one was over 2yrs ago and states he is ready to consider surgical options if he is a candidate.     Interval History (6/2/2020):  The patient presents for follow-up of lower back pain.  Patient has associated radiculopathy down right leg posterior down into heel.  Patient has had no significant relief from prior epidural.  He has no recent imaging.  Patient started Cymbalta 30 mg q.h.s. and is taking gabapentin 400 mg q.a.m. and during day as well as 2 at bedtime.  He states pain is not focally worse in morning, it is worse with movement and standing.  He does not endorse relief with forward flexion.  He states stretching to contralateral side alleviates pain minimal.  He denies loss of bowel, bladder, denies saddle paresthesias.  He rates his pain 7/10 today.      Interval History(4/6/2020):  Contacted Mr. Liu at home for follow-up of his chronic low back pain. He states that since his last appointment, his pain has significantly improved.  He continues to endorse intermittent right-sided low back pain with occasional radiation down his right leg to his right calf. He was started on trial of Cymbalta last month which he states has provided significant relief of his overall pain. In addition, he continues ot take gabapentin with added relief. He has also participated in home exercises over this time.     Interval History (3/3/2020):  Nacho Liu presents to the clinic for a follow-up appointment for low back and leg pain. He is s/p right L4/5 and L5/S1 TF PAVITHRA on 2/3/2020. He reports no significant relief of his pain. He continues to report low back pain that radiates down the side and back of his right leg to mid calf. He also reports burning pain into the anteromedial aspect of his right thigh. He denies any left leg pain. His pain is worse with prolonged standing, especially with work. He continues to take Gabapentin with limited relief. He denies any other health changes. He denies any bowel or bladder incontinence. His pain today is 10/10.      Pain Disability Index Review:  Last 3 PDI Scores 11/2/2020 8/18/2020 3/3/2020   Pain Disability Index (PDI) 70 68 64       Pain Medications:  Gabapentin 800 mg TID  Cymbalta 30mg QD    Tried in the Past:  Lyrica (too expensive)      Opioid Contract: no     report:  Reviewed and consistent with medication use as prescribed.    Pain Procedures:   2 previous ESIs at [facility] in 2018  8/30/2018- Right L4 and S1 TF PAVITHRA   10/29/2018- Right L5 and S1 TF PAVITHRA  12/3/2018- Right L4 and L5 TF PAVITHRA  8/1/2019- Right L4/5 and L5/S1 TF PAVITHRA- significant relief  2/3/2020- Right L4/5 and L5/S1 TF PAVITHRA- no relief  8/18/2020: Right L3/4&4/5 TFESI- no relief     Physical Therapy/Home Exercise: no    Imaging:   MRI Lumbar Spine 5/31/2018:  FINDINGS:  There is straightening of the normal lumbar lordosis.  Heterogeneous T1 bone marrow signal throughout the lumbar spine without focal bone marrow edema.  Overall concerning for  red  marrow reconversion clinical correlation for underlying chronic anemia, no evidence for focal infiltrative process.    There is scattered endplate degeneration most prominent at L4/L5 level with superimposed disc desiccation and height loss.    The distal spinal cord and conus is normal in signal and contour tip of the conus approximates the inferior L1 level.    No aneurysmal dilatation of the visualized abdominal aorta.    T12/L1 through L1/L2: No significant disc bulge, central canal or neural foraminal stenosis.    L2/L3: No significant disc bulge central canal or neural foraminal stenosis.    L3/L4:Small posterior disc osteophyte without significant central canal or neural foraminal stenosis    L4/L5:Posterior disc osteophyte with ligamentum flavum hypertrophy and small annular fissure with mild central canal stenosis and mild neural foraminal narrowing.    L5/S1: Small posterior disc osteophyte with ligamentum flavum hypertrophy without significant central canal stenosis with attenuation of the thecal sac related to epidural lipomatosis.  There is facet joint arthropathy and mild moderate neural foraminal stenosis bilaterally.    This report was flagged in Epic as abnormal.      Impression       Multilevel degenerative change lumbar spine most pronounced at L4/L5 with posterior disc osteophyte with ligamentum flavum hypertrophy and small annular fissure there is mild central canal and neural foraminal stenosis.    There is heterogeneous T1 bone marrow signal throughout the lumbosacral spine most compatible with red marrow reconversion clinical correlation for possible underlying anemia.     EMG 7/5/2018:  Normal study    Allergies:   Review of patient's allergies indicates:   Allergen Reactions    Lipitor [atorvastatin] Swelling       Current Medications:   Current Outpatient Medications   Medication Sig Dispense Refill    amLODIPine (NORVASC) 5 MG tablet TAKE 1 TABLET BY MOUTH 1 TIME PER DAY FOR BLOOD  PRESSURE      gabapentin (NEURONTIN) 400 MG capsule Take 2 capsules (800 mg total) by mouth 3 (three) times daily. TAKE 1 CAPSULE BY MOUTH IN THE MORNING , 1 CAPSULE IN THE AFTERNOON AND 2 CAPSULES AT BEDTIME 120 capsule 5    hydroCHLOROthiazide (HYDRODIURIL) 25 MG tablet       DULoxetine (CYMBALTA) 30 MG capsule Take 1 capsule (30 mg total) by mouth once daily. 30 capsule 5     No current facility-administered medications for this visit.      Facility-Administered Medications Ordered in Other Visits   Medication Dose Route Frequency Provider Last Rate Last Dose    0.9%  NaCl infusion  500 mL Intravenous Continuous Adryan More MD           REVIEW OF SYSTEMS:    GENERAL:  No weight loss, malaise or fevers.  HEENT:  Negative for frequent or significant headaches.  NECK:  Negative for lumps, goiter, pain and significant neck swelling.  RESPIRATORY:  Negative for cough, wheezing or shortness of breath.  CARDIOVASCULAR:  Negative for chest pain, leg swelling or palpitations. HTN  GI:  Negative for abdominal discomfort, blood in stools or black stools or change in bowel habits.  MUSCULOSKELETAL:  See HPI.  SKIN:  Negative for lesions, rash, and itching.  PSYCH:  Negative for sleep disturbance, mood disorder and recent psychosocial stressors.  HEMATOLOGY/LYMPHOLOGY:  Negative for prolonged bleeding, bruising easily or swollen nodes.  NEURO:   No history of headaches, syncope, paralysis, seizures or tremors.  All other reviewed and negative other than HPI.    Past Medical History:  Past Medical History:   Diagnosis Date    Heart murmur     Hypertension        Past Surgical History:  Past Surgical History:   Procedure Laterality Date    KNEE SURGERY      TRANSFORAMINAL EPIDURAL INJECTION OF STEROID Right 8/1/2019    Procedure: INJECTION, STEROID, EPIDURAL, TRANSFORAMINAL APPROACH;  Surgeon: Pascual Yadav MD;  Location: [facility];  Service: Pain Management;  Laterality: Right;    TRANSFORAMINAL EPIDURAL  "INJECTION OF STEROID Right 2/3/2020    Procedure: INJECTION, STEROID, EPIDURAL, TRANSFORAMINAL APPROACH, L4-L5  AND L5-S1;  Surgeon: Pascual Yadav MD;  Location: [facility];  Service: Pain Management;  Laterality: Right;    TRANSFORAMINAL EPIDURAL INJECTION OF STEROID Right 7/30/2020    Procedure: INJECTION, STEROID, EPIDURAL, TRANSFORAMINAL APPROACH;  Surgeon: Pascual Yadav MD;  Location: [facility];  Service: Pain Management;  Laterality: Right;  RIGHT TF PAVITHRA L3/4 and L4/5   consent needed       Family History:  Family History   Problem Relation Age of Onset    Arthritis Mother     Cirrhosis Father        Social History:  Social History     Socioeconomic History    Marital status:      Spouse name: Not on file    Number of children: Not on file    Years of education: Not on file    Highest education level: Not on file   Occupational History    Not on file   Social Needs    Financial resource strain: Not on file    Food insecurity     Worry: Not on file     Inability: Not on file    Transportation needs     Medical: Not on file     Non-medical: Not on file   Tobacco Use    Smoking status: Never Smoker    Smokeless tobacco: Never Used   Substance and Sexual Activity    Alcohol use: Yes    Drug use: No    Sexual activity: Not on file   Lifestyle    Physical activity     Days per week: Not on file     Minutes per session: Not on file    Stress: Not on file   Relationships    Social connections     Talks on phone: Not on file     Gets together: Not on file     Attends Religion service: Not on file     Active member of club or organization: Not on file     Attends meetings of clubs or organizations: Not on file     Relationship status: Not on file   Other Topics Concern    Not on file   Social History Narrative    Not on file       OBJECTIVE:    /86   Pulse 79   Temp 98.6 °F (37 °C)   Resp 18   Ht 5' 9" (1.753 m)   Wt 70 kg (154 lb 5.2 oz)   SpO2 100%   BMI 22.79 kg/m² "       PHYSICAL EXAMINATION:    General appearance: Well appearing, in no acute distress, alert and oriented x3.  Psych:  Mood and affect appropriate.  Skin: Skin color, texture, turgor normal, no rashes or lesions, in both upper and lower body.  Head/face:  Atraumatic, normocephalic. No palpable lymph nodes.  Cor: regular rate  Pulm: normal effort   Back: Straight leg raising in the sitting position is negative bilaterally. There is no facet loading bilaterally.    Extremities: Peripheral joint ROM is full and pain free without obvious instability or laxity in all four extremities. No deformities, edema, or skin discoloration.  Musculoskeletal:  He has 4/5 hip flexion, knee extension and flexion bilaterally, 5/5 bilaterally plantar flexion and EHL. There is mild decrease in Dorsiflexion on Right.  No atrophy or tone abnormalities are noted.  Neuro:  Bilateral lower extremity coordination and muscle stretch reflexes are physiologic and symmetric.  Plantar response are downgoing. Decreased sensation to right calf and dorsum of  foot. Patellar reflexes diminished equally bilateral.   Gait: Antalgic- ambulates without assistance.     ASSESSMENT: 59 y.o. year old male with low back and leg pain, consistent with the following:     No diagnosis found.      PLAN:     - Prior records reviewed  - He is s/p multiple ESIs without any benefit  - Discussed obtaining previously ordered MRI as pain is unchanged from ESIs.   - Ordered Flexion/Extension Lumbar xray   - Refilled Gabapentin 800 mg TID.   - Refill Cymbalta 30 mg daily.   - Start Zanaflex 4mg TID. Discussed starting this at qhs dosing and titration discussed.  - Patient can continue with medications for now since they are providing benefits, using them appropriately, and without side effects.  - Counseled patient regarding the importance of constant sleeping habits.  - Referred to NS placed  - Can consider [place] back S[facility] trail if not surgical candidate.   - RTC  after following up with NS for further treatment options.   The above plan and management options were discussed at length with patient. Patient is in agreement with the above and verbalized understanding.    Ari Dickerson NP  11/02/2020

## 2020-11-04 ENCOUNTER — TELEPHONE (OUTPATIENT)
Dept: PAIN MEDICINE | Facility: CLINIC | Age: 60
End: 2020-11-04

## 2020-11-04 DIAGNOSIS — M79.604 RIGHT LEG PAIN: Primary | ICD-10-CM

## 2020-11-04 NOTE — TELEPHONE ENCOUNTER
Discussed Case with Dr Yadav. In 2018 had normal EMG. He has no back pain. Will order HILARY and notify Pt.

## 2020-11-17 ENCOUNTER — TELEPHONE (OUTPATIENT)
Dept: PAIN MEDICINE | Facility: CLINIC | Age: 60
End: 2020-11-17

## 2020-11-17 NOTE — TELEPHONE ENCOUNTER
----- Message from Janet Kim sent at 11/17/2020  8:51 AM CST -----  Regarding: appointment  Name of Who is Calling: WILIAN RODRÍGUEZ [[id number]] What is the request in detail: Patient is requesting a call back to schedule an appointment soon. Can the clinic reply by [facility]: No What Number to Call Back if not in [facility]: [phone]

## 2020-11-18 ENCOUNTER — TELEPHONE (OUTPATIENT)
Dept: PAIN MEDICINE | Facility: CLINIC | Age: 60
End: 2020-11-18

## 2020-11-18 NOTE — TELEPHONE ENCOUNTER
"This message is for patient in regards to his/her appointment 11/19/20 at 8 am.      [facility] Policy: For the safety of all patients and staff members.     Patient Visitor policy: Due to social distancing and limited seating staff are requesting patient to arrive to their schedule appointments alone. If patient do not need assistance with their visit, we're asking all visitors to remain outside the waiting area.    Upon arriving to facility; patient will have temperature taking and are required to wear a face mask, if patient do not have a face mask one will be provided. Upon arriving patient we ask patients to contact clinic at this number [phone] to notify staff that they have arrived or they may do do by utilizing the Mobile checked in [place](if patient have patient portal; clinical staff will send a message through there letting them know it's okay to proceed to their visit). Staff will give the patient the "okay" to come up or wait inside their vehicle until clinic is ready for patient to be seen by Ari Dickerson Np If you have any questions or concerns please contact [phone]        "

## 2020-11-19 ENCOUNTER — OFFICE VISIT (OUTPATIENT)
Dept: PAIN MEDICINE | Facility: CLINIC | Age: 60
End: 2020-11-19
Payer: COMMERCIAL

## 2020-11-19 VITALS
RESPIRATION RATE: 18 BRPM | WEIGHT: 155.63 LBS | HEART RATE: 72 BPM | DIASTOLIC BLOOD PRESSURE: 81 MMHG | OXYGEN SATURATION: 100 % | SYSTOLIC BLOOD PRESSURE: 151 MMHG | BODY MASS INDEX: 23.05 KG/M2 | HEIGHT: 69 IN

## 2020-11-19 DIAGNOSIS — M47.26 OSTEOARTHRITIS OF SPINE WITH RADICULOPATHY, LUMBAR REGION: ICD-10-CM

## 2020-11-19 DIAGNOSIS — M47.816 FACET ARTHRITIS OF LUMBAR REGION: Primary | ICD-10-CM

## 2020-11-19 DIAGNOSIS — M54.16 LUMBAR RADICULOPATHY: ICD-10-CM

## 2020-11-19 DIAGNOSIS — M47.816 LUMBAR SPONDYLOSIS: ICD-10-CM

## 2020-11-19 PROCEDURE — 3008F PR BODY MASS INDEX (BMI) DOCUMENTED: ICD-10-PCS | Mod: CPTII,S$GLB,, | Performed by: NURSE PRACTITIONER

## 2020-11-19 PROCEDURE — 99999 PR PBB SHADOW E&M-EST. PATIENT-LVL III: ICD-10-PCS | Mod: PBBFAC,,, | Performed by: NURSE PRACTITIONER

## 2020-11-19 PROCEDURE — 99213 OFFICE O/P EST LOW 20 MIN: CPT | Mod: S$GLB,,, | Performed by: NURSE PRACTITIONER

## 2020-11-19 PROCEDURE — 1125F PR PAIN SEVERITY QUANTIFIED, PAIN PRESENT: ICD-10-PCS | Mod: S$GLB,,, | Performed by: NURSE PRACTITIONER

## 2020-11-19 PROCEDURE — 99999 PR PBB SHADOW E&M-EST. PATIENT-LVL III: CPT | Mod: PBBFAC,,, | Performed by: NURSE PRACTITIONER

## 2020-11-19 PROCEDURE — 99213 PR OFFICE/OUTPT VISIT, EST, LEVL III, 20-29 MIN: ICD-10-PCS | Mod: S$GLB,,, | Performed by: NURSE PRACTITIONER

## 2020-11-19 PROCEDURE — 1125F AMNT PAIN NOTED PAIN PRSNT: CPT | Mod: S$GLB,,, | Performed by: NURSE PRACTITIONER

## 2020-11-19 PROCEDURE — 3008F BODY MASS INDEX DOCD: CPT | Mod: CPTII,S$GLB,, | Performed by: NURSE PRACTITIONER

## 2020-11-19 RX ORDER — PREGABALIN 150 MG/1
150 CAPSULE ORAL 2 TIMES DAILY
Qty: 60 CAPSULE | Refills: 2 | Status: SHIPPED | OUTPATIENT
Start: 2020-11-19 | End: 2020-12-01

## 2020-11-19 NOTE — PROGRESS NOTES
Chronic Pain-Established Note (Follow up visit)          Chronic patient Established Note (Follow up visit)      SUBJECTIVE:    Interval History 11/19/2020:  Patient presents for follow-up of right leg pain.  He states pain is constant, severe no alleviating factors.  He has become tolerant to gabapentin 800 mg t.i.d..  He was seen by Dr. Gomez who did not feel that this was lumbar etiology surgical intervention.He is not Nova SCS trial candidate due to pain being focal to leg and not lumbar. Pt has not had vascular study at this time. EMG prior was normal, and he has again had no focal benefit from TFESIs. The patient denies myelopathic symptoms such as handwriting changes or difficulty with buttons/coins/keys. Denies perineal paresthesias, bowel/bladder dysfunction.      Interval History 11/2/2020:  The patient presents for delayed follow up of R leg radicular complaints. He has seen Dr Gomez with [facility] who did   Not feel he was a surgical candidate.  She she did state to discuss considering EMG and or spinal cord stimulator trial.  He has had EMG prior in 2018 which was reviewed without any acute abnormality and he has no change in pain.  He again has had no focal benefit from transforaminal epidurals. He would like to consider SCS as option at this time.     Interval History (8/18/2020):  The patient presents for follow up of lower back pain and right leg radiculopathy. He is s/p R L3/4 & L4/5 TFESI without any focal relief of pain. Pt state R leg pain is > Lumbago and he has difficulty describing pattern but points to lateral and anterior right thigh down anterior lower leg. Pain is described as sharp, aching, shooting, worse with standing, sitting and lying down. He denies focal a.m. stiffness, denies pain with valsalva, denies forward flexion alleviating pain. There are no mitigating factors except medications. He continues to take gabapentin 800mg TID and also taking Cymbalta QHS without adverse side effects.  He  denies any neurological emergency symptoms such as loss of bowel or bladder, denies saddle paresthesia.  He again, despite voicing this multiple times, has not obtained updated MRI and last one was over 2yrs ago and states he is ready to consider surgical options if he is a candidate.     Interval History (6/2/2020):  The patient presents for follow-up of lower back pain.  Patient has associated radiculopathy down right leg posterior down into heel.  Patient has had no significant relief from prior epidural.  He has no recent imaging.  Patient started Cymbalta 30 mg q.h.s. and is taking gabapentin 400 mg q.a.m. and during day as well as 2 at bedtime.  He states pain is not focally worse in morning, it is worse with movement and standing.  He does not endorse relief with forward flexion.  He states stretching to contralateral side alleviates pain minimal.  He denies loss of bowel, bladder, denies saddle paresthesias.  He rates his pain 7/10 today.      Interval History(4/6/2020):  Contacted Mr. Liu at home for follow-up of his chronic low back pain. He states that since his last appointment, his pain has significantly improved. He continues to endorse intermittent right-sided low back pain with occasional radiation down his right leg to his right calf. He was started on trial of Cymbalta last month which he states has provided significant relief of his overall pain. In addition, he continues ot take gabapentin with added relief. He has also participated in home exercises over this time.     Interval History (3/3/2020):  Nacho Liu presents to the clinic for a follow-up appointment for low back and leg pain. He is s/p right L4/5 and L5/S1 TF PAVITHRA on 2/3/2020. He reports no significant relief of his pain. He continues to report low back pain that radiates down the side and back of his right leg to mid calf. He also reports burning pain into the anteromedial aspect of his right thigh. He denies any left leg  pain. His pain is worse with prolonged standing, especially with work. He continues to take Gabapentin with limited relief. He denies any other health changes. He denies any bowel or bladder incontinence. His pain today is 10/10.      Pain Disability Index Review:  Last 3 PDI Scores 11/19/2020 11/2/2020 8/18/2020   Pain Disability Index (PDI) 61 70 68       Pain Medications:  Gabapentin 800 mg TID  Cymbalta 30mg QD    Tried in the Past:  Lyrica (too expensive)      Opioid Contract: no     report:  Reviewed and consistent with medication use as prescribed.    Pain Procedures:   2 previous ESIs at [facility] in 2018  8/30/2018- Right L4 and S1 TF PAVITHRA   10/29/2018- Right L5 and S1 TF PAVITHRA  12/3/2018- Right L4 and L5 TF PAVITHRA  8/1/2019- Right L4/5 and L5/S1 TF PAVITHRA- significant relief  2/3/2020- Right L4/5 and L5/S1 TF PAVITHRA- no relief  8/18/2020: Right L3/4&4/5 TFESI- no relief     Physical Therapy/Home Exercise: no    Imaging:   MRI Lumbar Spine 5/31/2018:  FINDINGS:  There is straightening of the normal lumbar lordosis.  Heterogeneous T1 bone marrow signal throughout the lumbar spine without focal bone marrow edema.  Overall concerning for  red marrow reconversion clinical correlation for underlying chronic anemia, no evidence for focal infiltrative process.    There is scattered endplate degeneration most prominent at L4/L5 level with superimposed disc desiccation and height loss.    The distal spinal cord and conus is normal in signal and contour tip of the conus approximates the inferior L1 level.    No aneurysmal dilatation of the visualized abdominal aorta.    T12/L1 through L1/L2: No significant disc bulge, central canal or neural foraminal stenosis.    L2/L3: No significant disc bulge central canal or neural foraminal stenosis.    L3/L4:Small posterior disc osteophyte without significant central canal or neural foraminal stenosis    L4/L5:Posterior disc osteophyte with ligamentum flavum hypertrophy and small annular  fissure with mild central canal stenosis and mild neural foraminal narrowing.    L5/S1: Small posterior disc osteophyte with ligamentum flavum hypertrophy without significant central canal stenosis with attenuation of the thecal sac related to epidural lipomatosis.  There is facet joint arthropathy and mild moderate neural foraminal stenosis bilaterally.    This report was flagged in Epic as abnormal.      Impression       Multilevel degenerative change lumbar spine most pronounced at L4/L5 with posterior disc osteophyte with ligamentum flavum hypertrophy and small annular fissure there is mild central canal and neural foraminal stenosis.    There is heterogeneous T1 bone marrow signal throughout the lumbosacral spine most compatible with red marrow reconversion clinical correlation for possible underlying anemia.     EMG 7/5/2018:  Normal study    Allergies:   Review of patient's allergies indicates:   Allergen Reactions    Lipitor [atorvastatin] Swelling       Current Medications:   Current Outpatient Medications   Medication Sig Dispense Refill    amLODIPine (NORVASC) 5 MG tablet TAKE 1 TABLET BY MOUTH 1 TIME PER DAY FOR BLOOD PRESSURE      hydroCHLOROthiazide (HYDRODIURIL) 25 MG tablet       DULoxetine (CYMBALTA) 30 MG capsule Take 1 capsule (30 mg total) by mouth once daily. 30 capsule 5    pregabalin (LYRICA) 150 MG capsule Take 1 capsule (150 mg total) by mouth 2 (two) times daily. 60 capsule 2     No current facility-administered medications for this visit.      Facility-Administered Medications Ordered in Other Visits   Medication Dose Route Frequency Provider Last Rate Last Dose    0.9%  NaCl infusion  500 mL Intravenous Continuous Adryan More MD           REVIEW OF SYSTEMS:    GENERAL:  No weight loss, malaise or fevers.  HEENT:  Negative for frequent or significant headaches.  NECK:  Negative for lumps, goiter, pain and significant neck swelling.  RESPIRATORY:  Negative for cough, wheezing or  shortness of breath.  CARDIOVASCULAR:  Negative for chest pain, leg swelling or palpitations. HTN  GI:  Negative for abdominal discomfort, blood in stools or black stools or change in bowel habits.  MUSCULOSKELETAL:  See HPI.  SKIN:  Negative for lesions, rash, and itching.  PSYCH:  Negative for sleep disturbance, mood disorder and recent psychosocial stressors.  HEMATOLOGY/LYMPHOLOGY:  Negative for prolonged bleeding, bruising easily or swollen nodes.  NEURO:   No history of headaches, syncope, paralysis, seizures or tremors.  All other reviewed and negative other than HPI.    Past Medical History:  Past Medical History:   Diagnosis Date    Heart murmur     Hypertension        Past Surgical History:  Past Surgical History:   Procedure Laterality Date    KNEE SURGERY      TRANSFORAMINAL EPIDURAL INJECTION OF STEROID Right 8/1/2019    Procedure: INJECTION, STEROID, EPIDURAL, TRANSFORAMINAL APPROACH;  Surgeon: Pascual Yadav MD;  Location: [facility] PAIN MGT;  Service: Pain Management;  Laterality: Right;    TRANSFORAMINAL EPIDURAL INJECTION OF STEROID Right 2/3/2020    Procedure: INJECTION, STEROID, EPIDURAL, TRANSFORAMINAL APPROACH, L4-L5  AND L5-S1;  Surgeon: Pascual Yadav MD;  Location: [facility] PAIN MGT;  Service: Pain Management;  Laterality: Right;    TRANSFORAMINAL EPIDURAL INJECTION OF STEROID Right 7/30/2020    Procedure: INJECTION, STEROID, EPIDURAL, TRANSFORAMINAL APPROACH;  Surgeon: Pascual Yadav MD;  Location: [facility] PAIN MGT;  Service: Pain Management;  Laterality: Right;  RIGHT TF PAVITHRA L3/4 and L4/5   consent needed       Family History:  Family History   Problem Relation Age of Onset    Arthritis Mother     Cirrhosis Father        Social History:  Social History     Socioeconomic History    Marital status:      Spouse name: Not on file    Number of children: Not on file    Years of education: Not on file    Highest education level: Not on file   Occupational History    Not on file   Social Needs  "   Financial resource strain: Not on file    Food insecurity     Worry: Not on file     Inability: Not on file    Transportation needs     Medical: Not on file     Non-medical: Not on file   Tobacco Use    Smoking status: Never Smoker    Smokeless tobacco: Never Used   Substance and Sexual Activity    Alcohol use: Yes    Drug use: No    Sexual activity: Not on file   Lifestyle    Physical activity     Days per week: Not on file     Minutes per session: Not on file    Stress: Not on file   Relationships    Social connections     Talks on phone: Not on file     Gets together: Not on file     Attends Taoist service: Not on file     Active member of club or organization: Not on file     Attends meetings of clubs or organizations: Not on file     Relationship status: Not on file   Other Topics Concern    Not on file   Social History Narrative    Not on file       OBJECTIVE:    BP (!) 151/81   Pulse 72   Resp 18   Ht 5' 9" (1.753 m)   Wt 70.6 kg (155 lb 10.3 oz)   SpO2 100%   BMI 22.98 kg/m²       PHYSICAL EXAMINATION:  Voice normal.  Normal affect without evidence of distress.  Back: No facet loading, no GTB or PSIS TTP.     Prior PHYSICAL EXAMINATION:    General appearance: Well appearing, in no acute distress, alert and oriented x3.  Psych:  Mood and affect appropriate.  Skin: Skin color, texture, turgor normal, no rashes or lesions, in both upper and lower body.  Head/face:  Atraumatic, normocephalic. No palpable lymph nodes.  Cor: regular rate  Pulm: normal effort   Back: Straight leg raising in the sitting position is negative bilaterally. There is no facet loading bilaterally.    Extremities: Peripheral joint ROM is full and pain free without obvious instability or laxity in all four extremities. No deformities, edema, or skin discoloration.  Musculoskeletal:  He has 4/5 hip flexion, knee extension and flexion bilaterally, 5/5 bilaterally plantar flexion and EHL. There is mild decrease in " Dorsiflexion on Right.  No atrophy or tone abnormalities are noted.  Neuro:  Bilateral lower extremity coordination and muscle stretch reflexes are physiologic and symmetric.  Plantar response are downgoing. Decreased sensation to right calf and dorsum of  foot. Patellar reflexes diminished equally bilateral.   Gait: Antalgic- ambulates without assistance.     ASSESSMENT: 59 y.o. year old male with low back and leg pain, consistent with the followin. Facet arthritis of lumbar region     2. Osteoarthritis of spine with radiculopathy, lumbar region     3. Lumbar spondylosis     4. Lumbar radiculopathy           PLAN:     - Prior records reviewed  - He is s/p multiple ESIs without any benefit  - Stop Gabapentin 800 mg TID.   - Start Lyrica 150mg BID  - Continue Cymbalta 30 mg daily.   - Continue Zanaflex 4mg TID. Discussed starting this at qhs dosing and titration discussed.  - Patient can continue with medications for now since they are providing benefits, using them appropriately, and without side effects.  - He has seen [facility] who does not feel he is surgical candidate or leg pain of lumbar etiology  - He is not Nova SCS trial candidate due to no back pain associated  - He has not had vascular study recommended and will get him scheduled for this   - RTC, FU thereafter   The above plan and management options were discussed at length with patient. Patient is in agreement with the above and verbalized understanding.    Ari Dickerson NP  2020

## 2020-11-30 ENCOUNTER — TELEPHONE (OUTPATIENT)
Dept: PAIN MEDICINE | Facility: CLINIC | Age: 60
End: 2020-11-30

## 2020-11-30 NOTE — TELEPHONE ENCOUNTER
"This message is for patient in regards to his/her appointment 12/01/20 at 08:15a       [facility] Policy: For the safety of all patients and staff members.     Patient Visitor policy: Due to social distancing and limited seating staff are requesting patient to arrive to their schedule appointments alone. If patient do not need assistance with their visit, we're asking all visitors to remain outside the waiting area.    Upon arriving to facility; patient will have temperature taking and are required to wear a face mask, if patient do not have a face mask one will be provided. Upon arriving patient we ask patients to contact clinic at this number [phone] to notify staff that they have arrived or they may do so by utilizing the Mobile checked in [place](if patient have patient portal; clinical staff will send a message through there letting them know it's okay to proceed to their visit). Staff will give the patient the "okay" to come up or wait inside their vehicle until clinic is ready for patient to be seen by Dr. Pascual Yadav MD. If you have any questions or concerns please contact [phone]      Pt verbalized understanding and has confirmed appointment  "

## 2020-12-01 ENCOUNTER — OFFICE VISIT (OUTPATIENT)
Dept: PAIN MEDICINE | Facility: CLINIC | Age: 60
End: 2020-12-01
Attending: ANESTHESIOLOGY
Payer: COMMERCIAL

## 2020-12-01 VITALS
HEIGHT: 69 IN | DIASTOLIC BLOOD PRESSURE: 78 MMHG | SYSTOLIC BLOOD PRESSURE: 128 MMHG | OXYGEN SATURATION: 100 % | HEART RATE: 83 BPM | TEMPERATURE: 97 F | RESPIRATION RATE: 18 BRPM | WEIGHT: 148.13 LBS | BODY MASS INDEX: 21.94 KG/M2

## 2020-12-01 DIAGNOSIS — G90.521 COMPLEX REGIONAL PAIN SYNDROME TYPE 1 OF RIGHT LOWER EXTREMITY: ICD-10-CM

## 2020-12-01 DIAGNOSIS — M47.26 OSTEOARTHRITIS OF SPINE WITH RADICULOPATHY, LUMBAR REGION: ICD-10-CM

## 2020-12-01 DIAGNOSIS — M54.9 INTRACTABLE BACK PAIN: Primary | ICD-10-CM

## 2020-12-01 PROCEDURE — 99999 PR PBB SHADOW E&M-EST. PATIENT-LVL III: CPT | Mod: PBBFAC,,, | Performed by: ANESTHESIOLOGY

## 2020-12-01 PROCEDURE — 1125F AMNT PAIN NOTED PAIN PRSNT: CPT | Mod: S$GLB,,, | Performed by: ANESTHESIOLOGY

## 2020-12-01 PROCEDURE — 99213 OFFICE O/P EST LOW 20 MIN: CPT | Mod: S$GLB,,, | Performed by: ANESTHESIOLOGY

## 2020-12-01 PROCEDURE — 3008F PR BODY MASS INDEX (BMI) DOCUMENTED: ICD-10-PCS | Mod: CPTII,S$GLB,, | Performed by: ANESTHESIOLOGY

## 2020-12-01 PROCEDURE — 3008F BODY MASS INDEX DOCD: CPT | Mod: CPTII,S$GLB,, | Performed by: ANESTHESIOLOGY

## 2020-12-01 PROCEDURE — 1125F PR PAIN SEVERITY QUANTIFIED, PAIN PRESENT: ICD-10-PCS | Mod: S$GLB,,, | Performed by: ANESTHESIOLOGY

## 2020-12-01 PROCEDURE — 99213 PR OFFICE/OUTPT VISIT, EST, LEVL III, 20-29 MIN: ICD-10-PCS | Mod: S$GLB,,, | Performed by: ANESTHESIOLOGY

## 2020-12-01 PROCEDURE — 99999 PR PBB SHADOW E&M-EST. PATIENT-LVL III: ICD-10-PCS | Mod: PBBFAC,,, | Performed by: ANESTHESIOLOGY

## 2020-12-01 RX ORDER — PREGABALIN 150 MG/1
150 CAPSULE ORAL 3 TIMES DAILY
Qty: 90 CAPSULE | Refills: 2 | Status: SHIPPED | OUTPATIENT
Start: 2020-12-01 | End: 2021-04-19

## 2020-12-01 NOTE — PROGRESS NOTES
Subjective:      Patient ID: Nacho Liu is a 59 y.o. male.    Chief Complaint: No chief complaint on file.    Referred by: No ref. provider found     HPI  He is here for follow-up.  He continues with severe pain down the right lower extremity.  Epidural helped 2 days only.  He has only had surgery on the right lower extremity previously we had a tear in the ligament.  He has sensitivity around the scar.  There is radicular symptoms down the lower extremity.  No new symptomatology otherwise.  Vascular study was ordered and continues to be pending.  He seen a spine surgery previously and he decided not to undergo surgery because of the odds he was given for successful surgery.    Discussed imaging results of fluoroscopy and MRI.    Past Medical History:   Diagnosis Date    Heart murmur     Hypertension        Past Surgical History:   Procedure Laterality Date    KNEE SURGERY      TRANSFORAMINAL EPIDURAL INJECTION OF STEROID Right 8/1/2019    Procedure: INJECTION, STEROID, EPIDURAL, TRANSFORAMINAL APPROACH;  Surgeon: Pascual Yadav MD;  [place] [facility] [place] [place] [facility];  Service: Pain Management;  Laterality: Right;    TRANSFORAMINAL EPIDURAL INJECTION OF STEROID Right 2/3/2020    Procedure: INJECTION, STEROID, EPIDURAL, TRANSFORAMINAL APPROACH, L4-L5  AND L5-S1;  Surgeon: Pascual Yadav MD;  [place] [facility] [place] [place] [facility];  Service: Pain Management;  Laterality: Right;    TRANSFORAMINAL EPIDURAL INJECTION OF STEROID Right 7/30/2020    Procedure: INJECTION, STEROID, EPIDURAL, TRANSFORAMINAL APPROACH;  Surgeon: Pascual Yadav MD;  Location: [facility], [place] PAIN [facility]GT;  Service: Pain Management;  Laterality: Right;  RIGHT TF PAVITHRA L3/4 and L4/5   consent needed       Review of patient's allergies indicates:   Allergen Reactions    Lipitor [atorvastatin] Swelling       Current Outpatient Medications   Medication Sig Dispense Refill    amLODIPine (NORVASC) 5 MG tablet TAKE 1 TABLET BY MOUTH 1 TIME PER DAY FOR BLOOD PRESSURE       "hydroCHLOROthiazide (HYDRODIURIL) 25 MG tablet       pregabalin (LYRICA) 150 MG capsule Take 1 capsule (150 mg total) by mouth 3 (three) times daily. 90 capsule 2    DULoxetine (CYMBALTA) 30 MG capsule Take 1 capsule (30 mg total) by mouth once daily. 30 capsule 5     No current facility-administered medications for this visit.      Facility-Administered Medications Ordered in Other Visits   Medication Dose Route Frequency Provider Last Rate Last Dose    0.9%  NaCl infusion  500 mL Intravenous Continuous Adryan More MD           Family History   Problem Relation Age of Onset    Arthritis Mother     Cirrhosis Father        Social History     Socioeconomic History    Marital status:      Spouse name: Not on file    Number of children: Not on file    Years of education: Not on file    Highest education level: Not on file   Occupational History    Not on file   Social Needs    Financial resource strain: Not on file    Food insecurity     Worry: Not on file     Inability: Not on file    Transportation needs     Medical: Not on file     Non-medical: Not on file   Tobacco Use    Smoking status: Never Smoker    Smokeless tobacco: Never Used   Substance and Sexual Activity    Alcohol use: Yes    Drug use: No    Sexual activity: Not on file   Lifestyle    Physical activity     Days per week: Not on file     Minutes per session: Not on file    Stress: Not on file   Relationships    Social connections     Talks on phone: Not on file     Gets together: Not on file     Attends Anabaptism service: Not on file     Active member of club or organization: Not on file     Attends meetings of clubs or organizations: Not on file     Relationship status: Not on file   Other Topics Concern    Not on file   Social History Narrative    Not on file           ROS        Objective:   /78   Pulse 83   Temp 97.2 °F (36.2 °C)   Resp 18   Ht 5' 9" (1.753 m)   Wt 67.2 kg (148 lb 2.4 oz)   SpO2 100%   BMI " 21.88 kg/m²   Pain Disability Index Review:  Last 3 PDI Scores 12/1/2020 11/19/2020 11/2/2020   Pain Disability Index (PDI) 70 61 70     Normocephalic.  Atraumatic.  Affect appropriate.  Breathing unlabored.  Extra ocular muscles intact.           Ortho/SPM Exam    scar of previous surgery.  Hyperesthesia around the scar.  Positive straight leg raise.  Assessment:       Encounter Diagnoses   Name Primary?    Intractable back pain Yes    Osteoarthritis of spine with radiculopathy, lumbar region     Complex regional pain syndrome type 1 of right lower extremity          Plan:   We discussed with the patient the assessment and recommendations. The following is the plan we agreed on:  1.  Get vascular studies done.  2.  Increase Lyrica to 3 times a day instead of twice a day.  3.  Psych eval and spinal cord stimulator trial.  I had full discussion with him about it.  [employer].      Diagnoses and all orders for this visit:    Intractable back pain  -     pregabalin (LYRICA) 150 MG capsule; Take 1 capsule (150 mg total) by mouth 3 (three) times daily.  -     Ambulatory referral/consult to Psychology; Future    Osteoarthritis of spine with radiculopathy, lumbar region  -     pregabalin (LYRICA) 150 MG capsule; Take 1 capsule (150 mg total) by mouth 3 (three) times daily.  -     Ambulatory referral/consult to Psychology; Future    Complex regional pain syndrome type 1 of right lower extremity

## 2020-12-07 ENCOUNTER — HOSPITAL ENCOUNTER (OUTPATIENT)
Dept: VASCULAR SURGERY | Facility: CLINIC | Age: 60
Discharge: HOME | End: 2020-12-07
Attending: NURSE PRACTITIONER
Payer: COMMERCIAL

## 2020-12-07 DIAGNOSIS — M79.604 RIGHT LEG PAIN: ICD-10-CM

## 2020-12-07 PROCEDURE — 93923 UPR/LXTR ART STDY 3+ LVLS: CPT | Mod: S$GLB,,, | Performed by: SURGERY

## 2020-12-07 PROCEDURE — 93923 PR NON-INVASIVE PHYSIOLOGIC STUDY EXTREMITY 3 LEVELS: ICD-10-PCS | Mod: S$GLB,,, | Performed by: SURGERY

## 2020-12-17 ENCOUNTER — TELEPHONE (OUTPATIENT)
Dept: PAIN MEDICINE | Facility: CLINIC | Age: 60
End: 2020-12-17

## 2020-12-17 ENCOUNTER — TELEPHONE (OUTPATIENT)
Dept: PSYCHIATRY | Facility: CLINIC | Age: 60
End: 2020-12-17

## 2020-12-17 NOTE — TELEPHONE ENCOUNTER
As per Dr. Cortez- patient canceled psych testing/eval and stated he does not wish to pursue SCS because he is feeling better.

## 2020-12-17 NOTE — TELEPHONE ENCOUNTER
I called Mr. Liu to check-in about his appointment for 8 am.  He answered on the 3rd call and stated that he does not wish to pursue SCS because he is feeling better.  He was encouraged to reach out to my office if he is interested in the future, and informed the evaluation results are typically good for one year.  He understood.

## 2021-01-29 ENCOUNTER — OFFICE VISIT (OUTPATIENT)
Dept: PAIN MEDICINE | Facility: CLINIC | Age: 61
End: 2021-01-29
Payer: COMMERCIAL

## 2021-01-29 VITALS
TEMPERATURE: 98 F | SYSTOLIC BLOOD PRESSURE: 120 MMHG | DIASTOLIC BLOOD PRESSURE: 75 MMHG | HEIGHT: 69 IN | WEIGHT: 149 LBS | HEART RATE: 90 BPM | BODY MASS INDEX: 22.07 KG/M2 | RESPIRATION RATE: 18 BRPM

## 2021-01-29 DIAGNOSIS — M79.18 MYOFASCIAL PAIN: ICD-10-CM

## 2021-01-29 DIAGNOSIS — M54.16 LUMBAR RADICULOPATHY: Primary | ICD-10-CM

## 2021-01-29 DIAGNOSIS — M47.816 LUMBAR SPONDYLOSIS: ICD-10-CM

## 2021-01-29 DIAGNOSIS — G89.4 CHRONIC PAIN DISORDER: ICD-10-CM

## 2021-01-29 DIAGNOSIS — M51.36 DDD (DEGENERATIVE DISC DISEASE), LUMBAR: ICD-10-CM

## 2021-01-29 DIAGNOSIS — M47.26 OSTEOARTHRITIS OF SPINE WITH RADICULOPATHY, LUMBAR REGION: ICD-10-CM

## 2021-01-29 PROCEDURE — 3008F PR BODY MASS INDEX (BMI) DOCUMENTED: ICD-10-PCS | Mod: CPTII,S$GLB,, | Performed by: NURSE PRACTITIONER

## 2021-01-29 PROCEDURE — 99213 PR OFFICE/OUTPT VISIT, EST, LEVL III, 20-29 MIN: ICD-10-PCS | Mod: S$GLB,,, | Performed by: NURSE PRACTITIONER

## 2021-01-29 PROCEDURE — 3008F BODY MASS INDEX DOCD: CPT | Mod: CPTII,S$GLB,, | Performed by: NURSE PRACTITIONER

## 2021-01-29 PROCEDURE — 1125F AMNT PAIN NOTED PAIN PRSNT: CPT | Mod: S$GLB,,, | Performed by: NURSE PRACTITIONER

## 2021-01-29 PROCEDURE — 1125F PR PAIN SEVERITY QUANTIFIED, PAIN PRESENT: ICD-10-PCS | Mod: S$GLB,,, | Performed by: NURSE PRACTITIONER

## 2021-01-29 PROCEDURE — 99213 OFFICE O/P EST LOW 20 MIN: CPT | Mod: S$GLB,,, | Performed by: NURSE PRACTITIONER

## 2021-01-29 PROCEDURE — 99999 PR PBB SHADOW E&M-EST. PATIENT-LVL III: CPT | Mod: PBBFAC,,, | Performed by: NURSE PRACTITIONER

## 2021-01-29 PROCEDURE — 99999 PR PBB SHADOW E&M-EST. PATIENT-LVL III: ICD-10-PCS | Mod: PBBFAC,,, | Performed by: NURSE PRACTITIONER

## 2021-01-29 RX ORDER — DULOXETIN HYDROCHLORIDE 30 MG/1
30 CAPSULE, DELAYED RELEASE ORAL DAILY
Qty: 30 CAPSULE | Refills: 1 | Status: SHIPPED | OUTPATIENT
Start: 2021-01-29 | End: 2021-03-26

## 2021-01-29 RX ORDER — TIZANIDINE 4 MG/1
4 TABLET ORAL NIGHTLY PRN
Qty: 30 TABLET | Refills: 1 | Status: SHIPPED | OUTPATIENT
Start: 2021-01-29 | End: 2021-02-28

## 2021-02-26 ENCOUNTER — TELEPHONE (OUTPATIENT)
Dept: PAIN MEDICINE | Facility: CLINIC | Age: 61
End: 2021-02-26

## 2021-04-13 DIAGNOSIS — G89.4 CHRONIC PAIN DISORDER: ICD-10-CM

## 2021-04-13 DIAGNOSIS — M47.26 OSTEOARTHRITIS OF SPINE WITH RADICULOPATHY, LUMBAR REGION: ICD-10-CM

## 2021-04-13 DIAGNOSIS — M51.36 DDD (DEGENERATIVE DISC DISEASE), LUMBAR: ICD-10-CM

## 2021-04-13 DIAGNOSIS — M54.16 LUMBAR RADICULOPATHY: ICD-10-CM

## 2021-04-13 DIAGNOSIS — M54.9 INTRACTABLE BACK PAIN: ICD-10-CM

## 2021-04-13 DIAGNOSIS — M47.816 LUMBAR SPONDYLOSIS: ICD-10-CM

## 2021-04-13 RX ORDER — PREGABALIN 150 MG/1
CAPSULE ORAL
Qty: 90 CAPSULE | Refills: 0 | OUTPATIENT
Start: 2021-04-13

## 2021-04-13 RX ORDER — DULOXETIN HYDROCHLORIDE 30 MG/1
30 CAPSULE, DELAYED RELEASE ORAL DAILY
Qty: 30 CAPSULE | Refills: 0 | Status: SHIPPED | OUTPATIENT
Start: 2021-04-13 | End: 2021-04-16 | Stop reason: SDUPTHER

## 2021-04-14 ENCOUNTER — TELEPHONE (OUTPATIENT)
Dept: PAIN MEDICINE | Facility: CLINIC | Age: 61
End: 2021-04-14

## 2021-04-15 ENCOUNTER — TELEPHONE (OUTPATIENT)
Dept: PAIN MEDICINE | Facility: CLINIC | Age: 61
End: 2021-04-15

## 2021-04-16 ENCOUNTER — OFFICE VISIT (OUTPATIENT)
Dept: PAIN MEDICINE | Facility: CLINIC | Age: 61
End: 2021-04-16
Payer: COMMERCIAL

## 2021-04-16 VITALS
SYSTOLIC BLOOD PRESSURE: 138 MMHG | RESPIRATION RATE: 18 BRPM | DIASTOLIC BLOOD PRESSURE: 67 MMHG | OXYGEN SATURATION: 99 % | HEIGHT: 69 IN | BODY MASS INDEX: 22.07 KG/M2 | WEIGHT: 149 LBS | HEART RATE: 65 BPM

## 2021-04-16 DIAGNOSIS — G89.4 CHRONIC PAIN DISORDER: ICD-10-CM

## 2021-04-16 DIAGNOSIS — M54.9 INTRACTABLE BACK PAIN: ICD-10-CM

## 2021-04-16 DIAGNOSIS — M79.18 MYOFASCIAL PAIN: ICD-10-CM

## 2021-04-16 DIAGNOSIS — M47.816 LUMBAR SPONDYLOSIS: ICD-10-CM

## 2021-04-16 DIAGNOSIS — M51.36 DDD (DEGENERATIVE DISC DISEASE), LUMBAR: ICD-10-CM

## 2021-04-16 DIAGNOSIS — G90.521 COMPLEX REGIONAL PAIN SYNDROME TYPE 1 OF RIGHT LOWER EXTREMITY: ICD-10-CM

## 2021-04-16 DIAGNOSIS — M54.16 LUMBAR RADICULOPATHY: ICD-10-CM

## 2021-04-16 DIAGNOSIS — M47.26 OSTEOARTHRITIS OF SPINE WITH RADICULOPATHY, LUMBAR REGION: Primary | ICD-10-CM

## 2021-04-16 PROCEDURE — 99213 PR OFFICE/OUTPT VISIT, EST, LEVL III, 20-29 MIN: ICD-10-PCS | Mod: S$GLB,,, | Performed by: NURSE PRACTITIONER

## 2021-04-16 PROCEDURE — 3008F BODY MASS INDEX DOCD: CPT | Mod: CPTII,S$GLB,, | Performed by: NURSE PRACTITIONER

## 2021-04-16 PROCEDURE — 99213 OFFICE O/P EST LOW 20 MIN: CPT | Mod: S$GLB,,, | Performed by: NURSE PRACTITIONER

## 2021-04-16 PROCEDURE — 1125F AMNT PAIN NOTED PAIN PRSNT: CPT | Mod: S$GLB,,, | Performed by: NURSE PRACTITIONER

## 2021-04-16 PROCEDURE — 1125F PR PAIN SEVERITY QUANTIFIED, PAIN PRESENT: ICD-10-PCS | Mod: S$GLB,,, | Performed by: NURSE PRACTITIONER

## 2021-04-16 PROCEDURE — 99999 PR PBB SHADOW E&M-EST. PATIENT-LVL III: ICD-10-PCS | Mod: PBBFAC,,, | Performed by: NURSE PRACTITIONER

## 2021-04-16 PROCEDURE — 3008F PR BODY MASS INDEX (BMI) DOCUMENTED: ICD-10-PCS | Mod: CPTII,S$GLB,, | Performed by: NURSE PRACTITIONER

## 2021-04-16 PROCEDURE — 99999 PR PBB SHADOW E&M-EST. PATIENT-LVL III: CPT | Mod: PBBFAC,,, | Performed by: NURSE PRACTITIONER

## 2021-04-16 RX ORDER — DULOXETIN HYDROCHLORIDE 30 MG/1
30 CAPSULE, DELAYED RELEASE ORAL DAILY
Qty: 30 CAPSULE | Refills: 5 | Status: SHIPPED | OUTPATIENT
Start: 2021-04-16 | End: 2021-08-27 | Stop reason: SDUPTHER

## 2021-04-16 RX ORDER — TIZANIDINE 4 MG/1
4 TABLET ORAL NIGHTLY
Qty: 10 TABLET | Refills: 0 | Status: SHIPPED | OUTPATIENT
Start: 2021-04-16 | End: 2021-04-26

## 2021-05-27 ENCOUNTER — CLINICAL SUPPORT (OUTPATIENT)
Dept: AUDIOLOGY | Facility: CLINIC | Age: 61
End: 2021-05-27
Payer: COMMERCIAL

## 2021-05-27 ENCOUNTER — LAB VISIT (OUTPATIENT)
Dept: LAB | Facility: HOSPITAL | Age: 61
End: 2021-05-27
Payer: COMMERCIAL

## 2021-05-27 ENCOUNTER — OFFICE VISIT (OUTPATIENT)
Dept: OTOLARYNGOLOGY | Facility: CLINIC | Age: 61
End: 2021-05-27
Payer: COMMERCIAL

## 2021-05-27 VITALS — HEART RATE: 66 BPM | DIASTOLIC BLOOD PRESSURE: 76 MMHG | SYSTOLIC BLOOD PRESSURE: 128 MMHG

## 2021-05-27 DIAGNOSIS — H93.11 TINNITUS OF RIGHT EAR: ICD-10-CM

## 2021-05-27 DIAGNOSIS — H90.3 SENSORINEURAL HEARING LOSS (SNHL) OF BOTH EARS: ICD-10-CM

## 2021-05-27 DIAGNOSIS — H90.A21 SENSORINEURAL HEARING LOSS (SNHL) OF RIGHT EAR WITH RESTRICTED HEARING OF LEFT EAR: Primary | ICD-10-CM

## 2021-05-27 LAB
CREAT SERPL-MCNC: 1 MG/DL (ref 0.5–1.4)
EST. GFR  (AFRICAN AMERICAN): >60 ML/MIN/1.73 M^2
EST. GFR  (NON AFRICAN AMERICAN): >60 ML/MIN/1.73 M^2

## 2021-05-27 PROCEDURE — 92567 PR TYMPA2METRY: ICD-10-PCS | Mod: S$GLB,,, | Performed by: AUDIOLOGIST

## 2021-05-27 PROCEDURE — 99214 PR OFFICE/OUTPT VISIT, EST, LEVL IV, 30-39 MIN: ICD-10-PCS | Mod: S$GLB,,, | Performed by: NURSE PRACTITIONER

## 2021-05-27 PROCEDURE — 1126F PR PAIN SEVERITY QUANTIFIED, NO PAIN PRESENT: ICD-10-PCS | Mod: S$GLB,,, | Performed by: NURSE PRACTITIONER

## 2021-05-27 PROCEDURE — 99214 OFFICE O/P EST MOD 30 MIN: CPT | Mod: S$GLB,,, | Performed by: NURSE PRACTITIONER

## 2021-05-27 PROCEDURE — 1126F AMNT PAIN NOTED NONE PRSNT: CPT | Mod: S$GLB,,, | Performed by: NURSE PRACTITIONER

## 2021-05-27 PROCEDURE — 92567 TYMPANOMETRY: CPT | Mod: S$GLB,,, | Performed by: AUDIOLOGIST

## 2021-05-27 PROCEDURE — 92557 PR COMPREHENSIVE HEARING TEST: ICD-10-PCS | Mod: S$GLB,,, | Performed by: AUDIOLOGIST

## 2021-05-27 PROCEDURE — 36415 COLL VENOUS BLD VENIPUNCTURE: CPT | Performed by: NURSE PRACTITIONER

## 2021-05-27 PROCEDURE — 92557 COMPREHENSIVE HEARING TEST: CPT | Mod: S$GLB,,, | Performed by: AUDIOLOGIST

## 2021-05-27 PROCEDURE — 82565 ASSAY OF CREATININE: CPT | Performed by: NURSE PRACTITIONER

## 2021-05-27 PROCEDURE — 99999 PR PBB SHADOW E&M-EST. PATIENT-LVL III: CPT | Mod: PBBFAC,,, | Performed by: NURSE PRACTITIONER

## 2021-05-27 PROCEDURE — 99999 PR PBB SHADOW E&M-EST. PATIENT-LVL III: ICD-10-PCS | Mod: PBBFAC,,, | Performed by: NURSE PRACTITIONER

## 2021-06-23 ENCOUNTER — HOSPITAL ENCOUNTER (OUTPATIENT)
Dept: RADIOLOGY | Facility: HOSPITAL | Age: 61
Discharge: HOME | End: 2021-06-23
Attending: NURSE PRACTITIONER
Payer: COMMERCIAL

## 2021-06-23 DIAGNOSIS — H90.3 SENSORINEURAL HEARING LOSS (SNHL) OF BOTH EARS: ICD-10-CM

## 2021-06-23 PROCEDURE — 25500020 PHARM REV CODE 255: Performed by: NURSE PRACTITIONER

## 2021-06-23 PROCEDURE — 70553 MRI BRAIN STEM W/O & W/DYE: CPT | Mod: 26,,, | Performed by: RADIOLOGY

## 2021-06-23 PROCEDURE — A9585 GADOBUTROL INJECTION: HCPCS | Performed by: NURSE PRACTITIONER

## 2021-06-23 PROCEDURE — 70553 MRI IAC/TEMPORAL BONES W W/O CONTRAST: ICD-10-PCS | Mod: 26,,, | Performed by: RADIOLOGY

## 2021-06-23 PROCEDURE — 70553 MRI BRAIN STEM W/O & W/DYE: CPT | Mod: TC

## 2021-06-23 RX ORDER — GADOBUTROL 604.72 MG/ML
8 INJECTION INTRAVENOUS
Status: COMPLETED | OUTPATIENT
Start: 2021-06-23 | End: 2021-06-23

## 2021-06-23 RX ADMIN — GADOBUTROL 8 ML: 604.72 INJECTION INTRAVENOUS at 10:06

## 2021-06-29 ENCOUNTER — TELEPHONE (OUTPATIENT)
Dept: OTOLARYNGOLOGY | Facility: CLINIC | Age: 61
End: 2021-06-29

## 2021-07-09 ENCOUNTER — OFFICE VISIT (OUTPATIENT)
Dept: OTOLARYNGOLOGY | Facility: CLINIC | Age: 61
End: 2021-07-09
Payer: COMMERCIAL

## 2021-07-09 VITALS — SYSTOLIC BLOOD PRESSURE: 135 MMHG | HEART RATE: 74 BPM | DIASTOLIC BLOOD PRESSURE: 79 MMHG

## 2021-07-09 DIAGNOSIS — H90.3 SENSORINEURAL HEARING LOSS (SNHL) OF BOTH EARS: ICD-10-CM

## 2021-07-09 DIAGNOSIS — H93.11 TINNITUS OF RIGHT EAR: ICD-10-CM

## 2021-07-09 DIAGNOSIS — R90.89 ABNORMAL FINDING ON MRI OF BRAIN: ICD-10-CM

## 2021-07-09 PROCEDURE — 1126F AMNT PAIN NOTED NONE PRSNT: CPT | Mod: CPTII,S$GLB,, | Performed by: NURSE PRACTITIONER

## 2021-07-09 PROCEDURE — 99213 PR OFFICE/OUTPT VISIT, EST, LEVL III, 20-29 MIN: ICD-10-PCS | Mod: S$GLB,,, | Performed by: NURSE PRACTITIONER

## 2021-07-09 PROCEDURE — 1126F PR PAIN SEVERITY QUANTIFIED, NO PAIN PRESENT: ICD-10-PCS | Mod: CPTII,S$GLB,, | Performed by: NURSE PRACTITIONER

## 2021-07-09 PROCEDURE — 99999 PR PBB SHADOW E&M-EST. PATIENT-LVL III: ICD-10-PCS | Mod: PBBFAC,,, | Performed by: NURSE PRACTITIONER

## 2021-07-09 PROCEDURE — 99213 OFFICE O/P EST LOW 20 MIN: CPT | Mod: S$GLB,,, | Performed by: NURSE PRACTITIONER

## 2021-07-09 PROCEDURE — 99999 PR PBB SHADOW E&M-EST. PATIENT-LVL III: CPT | Mod: PBBFAC,,, | Performed by: NURSE PRACTITIONER

## 2021-07-13 ENCOUNTER — CLINICAL SUPPORT (OUTPATIENT)
Dept: AUDIOLOGY | Facility: CLINIC | Age: 61
End: 2021-07-13
Payer: COMMERCIAL

## 2021-07-13 DIAGNOSIS — H90.A21 SENSORINEURAL HEARING LOSS (SNHL) OF RIGHT EAR WITH RESTRICTED HEARING OF LEFT EAR: Primary | ICD-10-CM

## 2021-07-13 PROCEDURE — V5160 PR DISPENSING FEE BINAURAL: ICD-10-PCS | Mod: CSM,S$GLB,, | Performed by: AUDIOLOGIST

## 2021-07-13 PROCEDURE — 99499 NO LOS: ICD-10-PCS | Mod: S$GLB,,, | Performed by: AUDIOLOGIST

## 2021-07-13 PROCEDURE — V5160 DISPENSING FEE BINAURAL: HCPCS | Mod: CSM,S$GLB,, | Performed by: AUDIOLOGIST

## 2021-07-13 PROCEDURE — 99499 UNLISTED E&M SERVICE: CPT | Mod: S$GLB,,, | Performed by: AUDIOLOGIST

## 2021-08-06 ENCOUNTER — CLINICAL SUPPORT (OUTPATIENT)
Dept: AUDIOLOGY | Facility: CLINIC | Age: 61
End: 2021-08-06
Payer: COMMERCIAL

## 2021-08-06 DIAGNOSIS — H90.A21 SENSORINEURAL HEARING LOSS (SNHL) OF RIGHT EAR WITH RESTRICTED HEARING OF LEFT EAR: Primary | ICD-10-CM

## 2021-08-06 PROCEDURE — V5261 PR HEARING AID, DIGIT, BIN, BTE: ICD-10-PCS | Mod: CSM,S$GLB,, | Performed by: AUDIOLOGIST

## 2021-08-06 PROCEDURE — JEFTAX-P JEFTAX-P: PRESCRIBED RATE 3.5%: Mod: CSM,S$GLB,, | Performed by: AUDIOLOGIST

## 2021-08-06 PROCEDURE — V5261 HEARING AID, DIGIT, BIN, BTE: HCPCS | Mod: CSM,S$GLB,, | Performed by: AUDIOLOGIST

## 2021-08-06 PROCEDURE — JEFTAX-P JEFTAX-P: PRESCRIBED RATE 3.5%: ICD-10-PCS | Mod: CSM,S$GLB,, | Performed by: AUDIOLOGIST

## 2021-08-27 ENCOUNTER — OFFICE VISIT (OUTPATIENT)
Dept: PAIN MEDICINE | Facility: CLINIC | Age: 61
End: 2021-08-27
Payer: COMMERCIAL

## 2021-08-27 ENCOUNTER — CLINICAL SUPPORT (OUTPATIENT)
Dept: AUDIOLOGY | Facility: CLINIC | Age: 61
End: 2021-08-27
Payer: COMMERCIAL

## 2021-08-27 VITALS
RESPIRATION RATE: 18 BRPM | BODY MASS INDEX: 24.44 KG/M2 | HEART RATE: 70 BPM | DIASTOLIC BLOOD PRESSURE: 88 MMHG | HEIGHT: 69 IN | SYSTOLIC BLOOD PRESSURE: 143 MMHG | WEIGHT: 165 LBS

## 2021-08-27 DIAGNOSIS — M54.9 INTRACTABLE BACK PAIN: ICD-10-CM

## 2021-08-27 DIAGNOSIS — M47.26 OSTEOARTHRITIS OF SPINE WITH RADICULOPATHY, LUMBAR REGION: ICD-10-CM

## 2021-08-27 DIAGNOSIS — M47.816 LUMBAR SPONDYLOSIS: ICD-10-CM

## 2021-08-27 DIAGNOSIS — M54.16 LUMBAR RADICULOPATHY: ICD-10-CM

## 2021-08-27 DIAGNOSIS — H90.A21 SENSORINEURAL HEARING LOSS (SNHL) OF RIGHT EAR WITH RESTRICTED HEARING OF LEFT EAR: Primary | ICD-10-CM

## 2021-08-27 DIAGNOSIS — M51.36 DDD (DEGENERATIVE DISC DISEASE), LUMBAR: ICD-10-CM

## 2021-08-27 DIAGNOSIS — G89.4 CHRONIC PAIN DISORDER: ICD-10-CM

## 2021-08-27 PROCEDURE — 99213 PR OFFICE/OUTPT VISIT, EST, LEVL III, 20-29 MIN: ICD-10-PCS | Mod: S$GLB,,, | Performed by: NURSE PRACTITIONER

## 2021-08-27 PROCEDURE — 99999 PR PBB SHADOW E&M-EST. PATIENT-LVL III: CPT | Mod: PBBFAC,,, | Performed by: NURSE PRACTITIONER

## 2021-08-27 PROCEDURE — 3077F PR MOST RECENT SYSTOLIC BLOOD PRESSURE >= 140 MM HG: ICD-10-PCS | Mod: CPTII,S$GLB,, | Performed by: NURSE PRACTITIONER

## 2021-08-27 PROCEDURE — 3008F BODY MASS INDEX DOCD: CPT | Mod: CPTII,S$GLB,, | Performed by: NURSE PRACTITIONER

## 2021-08-27 PROCEDURE — 99213 OFFICE O/P EST LOW 20 MIN: CPT | Mod: S$GLB,,, | Performed by: NURSE PRACTITIONER

## 2021-08-27 PROCEDURE — 3079F DIAST BP 80-89 MM HG: CPT | Mod: CPTII,S$GLB,, | Performed by: NURSE PRACTITIONER

## 2021-08-27 PROCEDURE — 99999 PR PBB SHADOW E&M-EST. PATIENT-LVL III: ICD-10-PCS | Mod: PBBFAC,,, | Performed by: NURSE PRACTITIONER

## 2021-08-27 PROCEDURE — 3077F SYST BP >= 140 MM HG: CPT | Mod: CPTII,S$GLB,, | Performed by: NURSE PRACTITIONER

## 2021-08-27 PROCEDURE — 1159F PR MEDICATION LIST DOCUMENTED IN MEDICAL RECORD: ICD-10-PCS | Mod: CPTII,S$GLB,, | Performed by: NURSE PRACTITIONER

## 2021-08-27 PROCEDURE — 1125F AMNT PAIN NOTED PAIN PRSNT: CPT | Mod: CPTII,S$GLB,, | Performed by: NURSE PRACTITIONER

## 2021-08-27 PROCEDURE — 1160F RVW MEDS BY RX/DR IN RCRD: CPT | Mod: CPTII,S$GLB,, | Performed by: NURSE PRACTITIONER

## 2021-08-27 PROCEDURE — 99499 UNLISTED E&M SERVICE: CPT | Mod: S$GLB,,, | Performed by: AUDIOLOGIST

## 2021-08-27 PROCEDURE — 99499 NO LOS: ICD-10-PCS | Mod: S$GLB,,, | Performed by: AUDIOLOGIST

## 2021-08-27 PROCEDURE — 3079F PR MOST RECENT DIASTOLIC BLOOD PRESSURE 80-89 MM HG: ICD-10-PCS | Mod: CPTII,S$GLB,, | Performed by: NURSE PRACTITIONER

## 2021-08-27 PROCEDURE — 1159F MED LIST DOCD IN RCRD: CPT | Mod: CPTII,S$GLB,, | Performed by: NURSE PRACTITIONER

## 2021-08-27 PROCEDURE — 1160F PR REVIEW ALL MEDS BY PRESCRIBER/CLIN PHARMACIST DOCUMENTED: ICD-10-PCS | Mod: CPTII,S$GLB,, | Performed by: NURSE PRACTITIONER

## 2021-08-27 PROCEDURE — 3008F PR BODY MASS INDEX (BMI) DOCUMENTED: ICD-10-PCS | Mod: CPTII,S$GLB,, | Performed by: NURSE PRACTITIONER

## 2021-08-27 PROCEDURE — 1125F PR PAIN SEVERITY QUANTIFIED, PAIN PRESENT: ICD-10-PCS | Mod: CPTII,S$GLB,, | Performed by: NURSE PRACTITIONER

## 2021-08-27 RX ORDER — MELOXICAM 15 MG/1
15 TABLET ORAL DAILY
Qty: 30 TABLET | Refills: 2 | Status: SHIPPED | OUTPATIENT
Start: 2021-08-27 | End: 2021-09-26

## 2021-08-27 RX ORDER — PREGABALIN 150 MG/1
150 CAPSULE ORAL 3 TIMES DAILY
Qty: 90 CAPSULE | Refills: 2 | Status: SHIPPED | OUTPATIENT
Start: 2021-08-27 | End: 2021-10-01 | Stop reason: SDUPTHER

## 2021-08-27 RX ORDER — DULOXETIN HYDROCHLORIDE 30 MG/1
30 CAPSULE, DELAYED RELEASE ORAL DAILY
Qty: 30 CAPSULE | Refills: 5 | Status: SHIPPED | OUTPATIENT
Start: 2021-08-27 | End: 2021-10-01 | Stop reason: SDUPTHER

## 2021-09-08 ENCOUNTER — TELEPHONE (OUTPATIENT)
Dept: ADMINISTRATIVE | Facility: OTHER | Age: 61
End: 2021-09-08

## 2021-09-13 ENCOUNTER — TELEPHONE (OUTPATIENT)
Dept: PAIN MEDICINE | Facility: CLINIC | Age: 61
End: 2021-09-13

## 2021-09-13 ENCOUNTER — HOSPITAL ENCOUNTER (OUTPATIENT)
Facility: OTHER | Age: 61
Discharge: HOME | End: 2021-09-13
Attending: ANESTHESIOLOGY | Admitting: ANESTHESIOLOGY
Payer: COMMERCIAL

## 2021-09-13 VITALS
DIASTOLIC BLOOD PRESSURE: 84 MMHG | WEIGHT: 150 LBS | HEIGHT: 69 IN | BODY MASS INDEX: 22.22 KG/M2 | OXYGEN SATURATION: 100 % | SYSTOLIC BLOOD PRESSURE: 153 MMHG | HEART RATE: 88 BPM | RESPIRATION RATE: 16 BRPM | TEMPERATURE: 99 F

## 2021-09-13 DIAGNOSIS — G89.29 CHRONIC PAIN: ICD-10-CM

## 2021-09-13 DIAGNOSIS — M54.16 LUMBAR RADICULOPATHY: Primary | ICD-10-CM

## 2021-09-13 DIAGNOSIS — M47.816 LUMBAR SPONDYLOSIS: ICD-10-CM

## 2021-09-13 PROCEDURE — 25000003 PHARM REV CODE 250: Performed by: ANESTHESIOLOGY

## 2021-09-13 PROCEDURE — 64484 NJX AA&/STRD TFRM EPI L/S EA: CPT | Mod: RT | Performed by: ANESTHESIOLOGY

## 2021-09-13 PROCEDURE — 64483 PR EPIDURAL INJ, ANES/STEROID, TRANSFORAMINAL, LUMB/SACR, SNGL LEVL: ICD-10-PCS | Mod: RT,,, | Performed by: ANESTHESIOLOGY

## 2021-09-13 PROCEDURE — 64483 NJX AA&/STRD TFRM EPI L/S 1: CPT | Mod: RT,,, | Performed by: ANESTHESIOLOGY

## 2021-09-13 PROCEDURE — 63600175 PHARM REV CODE 636 W HCPCS: Performed by: ANESTHESIOLOGY

## 2021-09-13 PROCEDURE — 25500020 PHARM REV CODE 255: Performed by: ANESTHESIOLOGY

## 2021-09-13 PROCEDURE — 64484 NJX AA&/STRD TFRM EPI L/S EA: CPT | Mod: RT,,, | Performed by: ANESTHESIOLOGY

## 2021-09-13 PROCEDURE — 64483 NJX AA&/STRD TFRM EPI L/S 1: CPT | Mod: RT | Performed by: ANESTHESIOLOGY

## 2021-09-13 PROCEDURE — 64484 PRA INJECT ANES/STEROID FORAMEN LUMBAR/SACRAL W IMG GUIDE ,EA ADD LEVEL: ICD-10-PCS | Mod: RT,,, | Performed by: ANESTHESIOLOGY

## 2021-09-13 RX ORDER — LIDOCAINE HYDROCHLORIDE 10 MG/ML
INJECTION INFILTRATION; PERINEURAL
Status: DISCONTINUED | OUTPATIENT
Start: 2021-09-13 | End: 2021-09-13 | Stop reason: HOSPADM

## 2021-09-13 RX ORDER — BUPIVACAINE HYDROCHLORIDE 2.5 MG/ML
INJECTION, SOLUTION EPIDURAL; INFILTRATION; INTRACAUDAL
Status: DISCONTINUED | OUTPATIENT
Start: 2021-09-13 | End: 2021-09-13 | Stop reason: HOSPADM

## 2021-09-13 RX ORDER — DEXAMETHASONE SODIUM PHOSPHATE 100 MG/10ML
INJECTION INTRAMUSCULAR; INTRAVENOUS
Status: DISCONTINUED | OUTPATIENT
Start: 2021-09-13 | End: 2021-09-13 | Stop reason: HOSPADM

## 2021-09-13 RX ORDER — SODIUM CHLORIDE 9 MG/ML
INJECTION, SOLUTION INTRAVENOUS CONTINUOUS
Status: DISCONTINUED | OUTPATIENT
Start: 2021-09-13 | End: 2021-09-13 | Stop reason: HOSPADM

## 2021-09-13 RX ORDER — LIDOCAINE HYDROCHLORIDE 10 MG/ML
INJECTION, SOLUTION EPIDURAL; INFILTRATION; INTRACAUDAL; PERINEURAL
Status: DISCONTINUED | OUTPATIENT
Start: 2021-09-13 | End: 2021-09-13 | Stop reason: HOSPADM

## 2021-09-30 ENCOUNTER — TELEPHONE (OUTPATIENT)
Dept: PAIN MEDICINE | Facility: CLINIC | Age: 61
End: 2021-09-30

## 2021-10-01 ENCOUNTER — OFFICE VISIT (OUTPATIENT)
Dept: PAIN MEDICINE | Facility: CLINIC | Age: 61
End: 2021-10-01
Payer: COMMERCIAL

## 2021-10-01 VITALS
HEART RATE: 93 BPM | TEMPERATURE: 97 F | BODY MASS INDEX: 21.81 KG/M2 | SYSTOLIC BLOOD PRESSURE: 145 MMHG | OXYGEN SATURATION: 100 % | WEIGHT: 147.25 LBS | HEIGHT: 69 IN | DIASTOLIC BLOOD PRESSURE: 88 MMHG

## 2021-10-01 DIAGNOSIS — M47.816 LUMBAR SPONDYLOSIS: ICD-10-CM

## 2021-10-01 DIAGNOSIS — M47.26 OSTEOARTHRITIS OF SPINE WITH RADICULOPATHY, LUMBAR REGION: ICD-10-CM

## 2021-10-01 DIAGNOSIS — M54.9 INTRACTABLE BACK PAIN: ICD-10-CM

## 2021-10-01 DIAGNOSIS — M51.36 DDD (DEGENERATIVE DISC DISEASE), LUMBAR: ICD-10-CM

## 2021-10-01 DIAGNOSIS — M54.16 LUMBAR RADICULOPATHY: ICD-10-CM

## 2021-10-01 DIAGNOSIS — G89.4 CHRONIC PAIN DISORDER: ICD-10-CM

## 2021-10-01 PROCEDURE — 3077F PR MOST RECENT SYSTOLIC BLOOD PRESSURE >= 140 MM HG: ICD-10-PCS | Mod: CPTII,S$GLB,, | Performed by: NURSE PRACTITIONER

## 2021-10-01 PROCEDURE — 3079F PR MOST RECENT DIASTOLIC BLOOD PRESSURE 80-89 MM HG: ICD-10-PCS | Mod: CPTII,S$GLB,, | Performed by: NURSE PRACTITIONER

## 2021-10-01 PROCEDURE — 1160F PR REVIEW ALL MEDS BY PRESCRIBER/CLIN PHARMACIST DOCUMENTED: ICD-10-PCS | Mod: CPTII,S$GLB,, | Performed by: NURSE PRACTITIONER

## 2021-10-01 PROCEDURE — 99999 PR PBB SHADOW E&M-EST. PATIENT-LVL III: CPT | Mod: PBBFAC,,, | Performed by: NURSE PRACTITIONER

## 2021-10-01 PROCEDURE — 99213 PR OFFICE/OUTPT VISIT, EST, LEVL III, 20-29 MIN: ICD-10-PCS | Mod: S$GLB,,, | Performed by: NURSE PRACTITIONER

## 2021-10-01 PROCEDURE — 1159F MED LIST DOCD IN RCRD: CPT | Mod: CPTII,S$GLB,, | Performed by: NURSE PRACTITIONER

## 2021-10-01 PROCEDURE — 3008F PR BODY MASS INDEX (BMI) DOCUMENTED: ICD-10-PCS | Mod: CPTII,S$GLB,, | Performed by: NURSE PRACTITIONER

## 2021-10-01 PROCEDURE — 3008F BODY MASS INDEX DOCD: CPT | Mod: CPTII,S$GLB,, | Performed by: NURSE PRACTITIONER

## 2021-10-01 PROCEDURE — 1160F RVW MEDS BY RX/DR IN RCRD: CPT | Mod: CPTII,S$GLB,, | Performed by: NURSE PRACTITIONER

## 2021-10-01 PROCEDURE — 3077F SYST BP >= 140 MM HG: CPT | Mod: CPTII,S$GLB,, | Performed by: NURSE PRACTITIONER

## 2021-10-01 PROCEDURE — 99999 PR PBB SHADOW E&M-EST. PATIENT-LVL III: ICD-10-PCS | Mod: PBBFAC,,, | Performed by: NURSE PRACTITIONER

## 2021-10-01 PROCEDURE — 1159F PR MEDICATION LIST DOCUMENTED IN MEDICAL RECORD: ICD-10-PCS | Mod: CPTII,S$GLB,, | Performed by: NURSE PRACTITIONER

## 2021-10-01 PROCEDURE — 99213 OFFICE O/P EST LOW 20 MIN: CPT | Mod: S$GLB,,, | Performed by: NURSE PRACTITIONER

## 2021-10-01 PROCEDURE — 3079F DIAST BP 80-89 MM HG: CPT | Mod: CPTII,S$GLB,, | Performed by: NURSE PRACTITIONER

## 2021-10-01 RX ORDER — DULOXETIN HYDROCHLORIDE 30 MG/1
30 CAPSULE, DELAYED RELEASE ORAL 2 TIMES DAILY
Qty: 60 CAPSULE | Refills: 0 | Status: SHIPPED | OUTPATIENT
Start: 2021-10-01 | End: 2021-11-17

## 2021-10-01 RX ORDER — PREGABALIN 150 MG/1
150 CAPSULE ORAL 3 TIMES DAILY
Qty: 90 CAPSULE | Refills: 2 | Status: SHIPPED | OUTPATIENT
Start: 2021-10-01 | End: 2021-12-30

## 2021-10-29 ENCOUNTER — TELEPHONE (OUTPATIENT)
Dept: PAIN MEDICINE | Facility: CLINIC | Age: 61
End: 2021-10-29
Payer: COMMERCIAL

## 2022-10-21 ENCOUNTER — TELEPHONE (OUTPATIENT)
Dept: ENDOSCOPY | Facility: HOSPITAL | Age: 62
End: 2022-10-21
Payer: COMMERCIAL

## 2022-10-21 DIAGNOSIS — Z86.010 HISTORY OF COLON POLYPS: Primary | ICD-10-CM

## 2022-10-21 NOTE — TELEPHONE ENCOUNTER
Outside referral from [facility] for screening colonoscopy - NP Karolina Turner. See media tab dated 10/21. Refendo placed.

## (undated) DEVICE — DRESSING LEUKOPLAST FLEX 1X3IN